# Patient Record
Sex: MALE | Race: WHITE | URBAN - METROPOLITAN AREA
[De-identification: names, ages, dates, MRNs, and addresses within clinical notes are randomized per-mention and may not be internally consistent; named-entity substitution may affect disease eponyms.]

---

## 2017-07-07 ENCOUNTER — INPATIENT (INPATIENT)
Facility: HOSPITAL | Age: 57
LOS: 2 days | Discharge: HOME | End: 2017-07-10
Attending: INTERNAL MEDICINE

## 2017-07-07 DIAGNOSIS — E78.00 PURE HYPERCHOLESTEROLEMIA, UNSPECIFIED: ICD-10-CM

## 2017-07-07 DIAGNOSIS — L03.119 CELLULITIS OF UNSPECIFIED PART OF LIMB: ICD-10-CM

## 2017-07-07 DIAGNOSIS — J18.9 PNEUMONIA, UNSPECIFIED ORGANISM: ICD-10-CM

## 2017-07-07 DIAGNOSIS — R09.02 HYPOXEMIA: ICD-10-CM

## 2017-07-07 DIAGNOSIS — I87.2 VENOUS INSUFFICIENCY (CHRONIC) (PERIPHERAL): ICD-10-CM

## 2017-07-07 DIAGNOSIS — G47.33 OBSTRUCTIVE SLEEP APNEA (ADULT) (PEDIATRIC): ICD-10-CM

## 2017-07-07 DIAGNOSIS — I51.7 CARDIOMEGALY: ICD-10-CM

## 2017-07-07 DIAGNOSIS — I27.2 OTHER SECONDARY PULMONARY HYPERTENSION: ICD-10-CM

## 2017-07-07 DIAGNOSIS — I10 ESSENTIAL (PRIMARY) HYPERTENSION: ICD-10-CM

## 2017-07-12 DIAGNOSIS — J18.9 PNEUMONIA, UNSPECIFIED ORGANISM: ICD-10-CM

## 2017-07-12 DIAGNOSIS — I50.9 HEART FAILURE, UNSPECIFIED: ICD-10-CM

## 2017-07-12 DIAGNOSIS — I11.0 HYPERTENSIVE HEART DISEASE WITH HEART FAILURE: ICD-10-CM

## 2017-07-12 DIAGNOSIS — N40.0 BENIGN PROSTATIC HYPERPLASIA WITHOUT LOWER URINARY TRACT SYMPTOMS: ICD-10-CM

## 2017-07-12 DIAGNOSIS — I27.2 OTHER SECONDARY PULMONARY HYPERTENSION: ICD-10-CM

## 2017-07-12 DIAGNOSIS — G47.33 OBSTRUCTIVE SLEEP APNEA (ADULT) (PEDIATRIC): ICD-10-CM

## 2017-07-12 DIAGNOSIS — I89.0 LYMPHEDEMA, NOT ELSEWHERE CLASSIFIED: ICD-10-CM

## 2017-07-12 DIAGNOSIS — Z86.718 PERSONAL HISTORY OF OTHER VENOUS THROMBOSIS AND EMBOLISM: ICD-10-CM

## 2017-07-12 DIAGNOSIS — J96.20 ACUTE AND CHRONIC RESPIRATORY FAILURE, UNSPECIFIED WHETHER WITH HYPOXIA OR HYPERCAPNIA: ICD-10-CM

## 2017-07-12 DIAGNOSIS — J20.9 ACUTE BRONCHITIS, UNSPECIFIED: ICD-10-CM

## 2017-07-12 DIAGNOSIS — I87.2 VENOUS INSUFFICIENCY (CHRONIC) (PERIPHERAL): ICD-10-CM

## 2017-07-12 DIAGNOSIS — R09.02 HYPOXEMIA: ICD-10-CM

## 2017-07-12 DIAGNOSIS — E66.9 OBESITY, UNSPECIFIED: ICD-10-CM

## 2017-07-12 DIAGNOSIS — E55.9 VITAMIN D DEFICIENCY, UNSPECIFIED: ICD-10-CM

## 2018-02-01 ENCOUNTER — INPATIENT (INPATIENT)
Facility: HOSPITAL | Age: 58
LOS: 1 days | Discharge: HOME | End: 2018-02-03
Attending: INTERNAL MEDICINE

## 2018-02-07 DIAGNOSIS — I11.0 HYPERTENSIVE HEART DISEASE WITH HEART FAILURE: ICD-10-CM

## 2018-02-07 DIAGNOSIS — J44.1 CHRONIC OBSTRUCTIVE PULMONARY DISEASE WITH (ACUTE) EXACERBATION: ICD-10-CM

## 2018-02-07 DIAGNOSIS — I50.9 HEART FAILURE, UNSPECIFIED: ICD-10-CM

## 2018-02-07 DIAGNOSIS — I89.0 LYMPHEDEMA, NOT ELSEWHERE CLASSIFIED: ICD-10-CM

## 2018-02-07 DIAGNOSIS — E66.2 MORBID (SEVERE) OBESITY WITH ALVEOLAR HYPOVENTILATION: ICD-10-CM

## 2018-02-07 DIAGNOSIS — I27.20 PULMONARY HYPERTENSION, UNSPECIFIED: ICD-10-CM

## 2018-02-07 DIAGNOSIS — R09.02 HYPOXEMIA: ICD-10-CM

## 2018-02-07 DIAGNOSIS — Z99.81 DEPENDENCE ON SUPPLEMENTAL OXYGEN: ICD-10-CM

## 2018-02-08 DIAGNOSIS — I50.32 CHRONIC DIASTOLIC (CONGESTIVE) HEART FAILURE: ICD-10-CM

## 2018-02-08 DIAGNOSIS — R06.02 SHORTNESS OF BREATH: ICD-10-CM

## 2018-04-06 PROBLEM — Z00.00 ENCOUNTER FOR PREVENTIVE HEALTH EXAMINATION: Status: ACTIVE | Noted: 2018-04-06

## 2019-08-02 ENCOUNTER — APPOINTMENT (OUTPATIENT)
Dept: VASCULAR SURGERY | Facility: CLINIC | Age: 59
End: 2019-08-02
Payer: MEDICARE

## 2019-08-02 VITALS
DIASTOLIC BLOOD PRESSURE: 100 MMHG | WEIGHT: 315 LBS | HEIGHT: 72 IN | SYSTOLIC BLOOD PRESSURE: 190 MMHG | BODY MASS INDEX: 42.66 KG/M2

## 2019-08-02 PROCEDURE — 29580 STRAPPING UNNA BOOT: CPT | Mod: 50

## 2019-08-09 ENCOUNTER — APPOINTMENT (OUTPATIENT)
Dept: VASCULAR SURGERY | Facility: CLINIC | Age: 59
End: 2019-08-09
Payer: MEDICARE

## 2019-08-09 VITALS — DIASTOLIC BLOOD PRESSURE: 88 MMHG | SYSTOLIC BLOOD PRESSURE: 175 MMHG

## 2019-08-09 PROCEDURE — 29580 STRAPPING UNNA BOOT: CPT | Mod: 50

## 2019-08-16 ENCOUNTER — APPOINTMENT (OUTPATIENT)
Dept: VASCULAR SURGERY | Facility: CLINIC | Age: 59
End: 2019-08-16
Payer: MEDICARE

## 2019-08-16 VITALS — SYSTOLIC BLOOD PRESSURE: 140 MMHG | DIASTOLIC BLOOD PRESSURE: 75 MMHG

## 2019-08-16 PROCEDURE — 29580 STRAPPING UNNA BOOT: CPT | Mod: 50

## 2019-08-16 NOTE — REVIEW OF SYSTEMS
[Chills] : no chills [Fever] : no fever [Chest Pain] : no chest pain [Shortness Of Breath] : no shortness of breath [Abdominal Pain] : no abdominal pain [Constipation] : no constipation [Vomiting] : no vomiting [Diarrhea] : no diarrhea [Dizziness] : no dizziness

## 2019-08-18 ENCOUNTER — INPATIENT (INPATIENT)
Facility: HOSPITAL | Age: 59
LOS: 11 days | Discharge: HOME | End: 2019-08-30
Attending: INTERNAL MEDICINE | Admitting: INTERNAL MEDICINE
Payer: MEDICARE

## 2019-08-18 VITALS
HEART RATE: 85 BPM | RESPIRATION RATE: 24 BRPM | TEMPERATURE: 103 F | HEIGHT: 71 IN | OXYGEN SATURATION: 96 % | SYSTOLIC BLOOD PRESSURE: 233 MMHG | DIASTOLIC BLOOD PRESSURE: 114 MMHG | WEIGHT: 315 LBS

## 2019-08-18 LAB
BASE EXCESS BLDV CALC-SCNC: 7.2 MMOL/L — HIGH (ref -2–2)
CA-I SERPL-SCNC: 1.22 MMOL/L — SIGNIFICANT CHANGE UP (ref 1.12–1.3)
GAS PNL BLDV: 143 MMOL/L — SIGNIFICANT CHANGE UP (ref 136–145)
GAS PNL BLDV: SIGNIFICANT CHANGE UP
HCO3 BLDV-SCNC: 37 MMOL/L — HIGH (ref 22–29)
HCT VFR BLDA CALC: 56 % — HIGH (ref 34–44)
HGB BLD CALC-MCNC: 18 G/DL — SIGNIFICANT CHANGE UP (ref 14–18)
HOROWITZ INDEX BLDV+IHG-RTO: 40 — SIGNIFICANT CHANGE UP
LACTATE BLDV-MCNC: 1.6 MMOL/L — SIGNIFICANT CHANGE UP (ref 0.5–1.6)
PCO2 BLDV: 70 MMHG — HIGH (ref 41–51)
PH BLDV: 7.32 — SIGNIFICANT CHANGE UP (ref 7.26–7.43)
PO2 BLDV: 45 MMHG — HIGH (ref 20–40)
POTASSIUM BLDV-SCNC: 0.2 MMOL/L — LOW (ref 3.3–5.6)
SAO2 % BLDV: 77 % — SIGNIFICANT CHANGE UP

## 2019-08-18 PROCEDURE — 71045 X-RAY EXAM CHEST 1 VIEW: CPT | Mod: 26

## 2019-08-18 PROCEDURE — 99291 CRITICAL CARE FIRST HOUR: CPT

## 2019-08-18 PROCEDURE — 99053 MED SERV 10PM-8AM 24 HR FAC: CPT

## 2019-08-18 RX ORDER — ACETAMINOPHEN 500 MG
650 TABLET ORAL ONCE
Refills: 0 | Status: COMPLETED | OUTPATIENT
Start: 2019-08-18 | End: 2019-08-18

## 2019-08-18 RX ORDER — NITROGLYCERIN 6.5 MG
15 CAPSULE, EXTENDED RELEASE ORAL
Qty: 50 | Refills: 0 | Status: DISCONTINUED | OUTPATIENT
Start: 2019-08-18 | End: 2019-08-19

## 2019-08-18 RX ORDER — ASPIRIN/CALCIUM CARB/MAGNESIUM 324 MG
325 TABLET ORAL ONCE
Refills: 0 | Status: COMPLETED | OUTPATIENT
Start: 2019-08-18 | End: 2019-08-18

## 2019-08-19 LAB
ALBUMIN SERPL ELPH-MCNC: 3.2 G/DL — LOW (ref 3.5–5.2)
ALBUMIN SERPL ELPH-MCNC: 3.8 G/DL — SIGNIFICANT CHANGE UP (ref 3.5–5.2)
ALP SERPL-CCNC: 69 U/L — SIGNIFICANT CHANGE UP (ref 30–115)
ALP SERPL-CCNC: 84 U/L — SIGNIFICANT CHANGE UP (ref 30–115)
ALT FLD-CCNC: 12 U/L — SIGNIFICANT CHANGE UP (ref 0–41)
ALT FLD-CCNC: 14 U/L — SIGNIFICANT CHANGE UP (ref 0–41)
ANION GAP SERPL CALC-SCNC: 11 MMOL/L — SIGNIFICANT CHANGE UP (ref 7–14)
ANION GAP SERPL CALC-SCNC: 11 MMOL/L — SIGNIFICANT CHANGE UP (ref 7–14)
ANISOCYTOSIS BLD QL: SLIGHT — SIGNIFICANT CHANGE UP
APPEARANCE UR: ABNORMAL
AST SERPL-CCNC: 17 U/L — SIGNIFICANT CHANGE UP (ref 0–41)
AST SERPL-CCNC: 17 U/L — SIGNIFICANT CHANGE UP (ref 0–41)
BACTERIA # UR AUTO: ABNORMAL
BASE EXCESS BLDA CALC-SCNC: 4.4 MMOL/L — HIGH (ref -2–2)
BASE EXCESS BLDA CALC-SCNC: 6.4 MMOL/L — HIGH (ref -2–2)
BASOPHILS # BLD AUTO: 0.02 K/UL — SIGNIFICANT CHANGE UP (ref 0–0.2)
BASOPHILS # BLD AUTO: 0.03 K/UL — SIGNIFICANT CHANGE UP (ref 0–0.2)
BASOPHILS NFR BLD AUTO: 0.1 % — SIGNIFICANT CHANGE UP (ref 0–1)
BASOPHILS NFR BLD AUTO: 0.2 % — SIGNIFICANT CHANGE UP (ref 0–1)
BILIRUB SERPL-MCNC: 1 MG/DL — SIGNIFICANT CHANGE UP (ref 0.2–1.2)
BILIRUB SERPL-MCNC: 1.9 MG/DL — HIGH (ref 0.2–1.2)
BILIRUB UR-MCNC: NEGATIVE — SIGNIFICANT CHANGE UP
BUN SERPL-MCNC: 20 MG/DL — SIGNIFICANT CHANGE UP (ref 10–20)
BUN SERPL-MCNC: 23 MG/DL — HIGH (ref 10–20)
CALCIUM SERPL-MCNC: 8.6 MG/DL — SIGNIFICANT CHANGE UP (ref 8.5–10.1)
CALCIUM SERPL-MCNC: 8.9 MG/DL — SIGNIFICANT CHANGE UP (ref 8.5–10.1)
CHLORIDE SERPL-SCNC: 101 MMOL/L — SIGNIFICANT CHANGE UP (ref 98–110)
CHLORIDE SERPL-SCNC: 95 MMOL/L — LOW (ref 98–110)
CO2 SERPL-SCNC: 33 MMOL/L — HIGH (ref 17–32)
CO2 SERPL-SCNC: 33 MMOL/L — HIGH (ref 17–32)
COLOR SPEC: SIGNIFICANT CHANGE UP
CREAT SERPL-MCNC: 1.2 MG/DL — SIGNIFICANT CHANGE UP (ref 0.7–1.5)
CREAT SERPL-MCNC: 1.3 MG/DL — SIGNIFICANT CHANGE UP (ref 0.7–1.5)
DIFF PNL FLD: ABNORMAL
EOSINOPHIL # BLD AUTO: 0.01 K/UL — SIGNIFICANT CHANGE UP (ref 0–0.7)
EOSINOPHIL # BLD AUTO: 0.05 K/UL — SIGNIFICANT CHANGE UP (ref 0–0.7)
EOSINOPHIL NFR BLD AUTO: 0 % — SIGNIFICANT CHANGE UP (ref 0–8)
EOSINOPHIL NFR BLD AUTO: 0.4 % — SIGNIFICANT CHANGE UP (ref 0–8)
EPI CELLS # UR: ABNORMAL /HPF
ESTIMATED AVERAGE GLUCOSE: 131 MG/DL — HIGH (ref 68–114)
GLUCOSE BLDC GLUCOMTR-MCNC: 109 MG/DL — HIGH (ref 70–99)
GLUCOSE BLDC GLUCOMTR-MCNC: 128 MG/DL — HIGH (ref 70–99)
GLUCOSE BLDC GLUCOMTR-MCNC: 99 MG/DL — SIGNIFICANT CHANGE UP (ref 70–99)
GLUCOSE SERPL-MCNC: 126 MG/DL — HIGH (ref 70–99)
GLUCOSE SERPL-MCNC: 94 MG/DL — SIGNIFICANT CHANGE UP (ref 70–99)
GLUCOSE UR QL: NEGATIVE MG/DL — SIGNIFICANT CHANGE UP
HBA1C BLD-MCNC: 6.2 % — HIGH (ref 4–5.6)
HCO3 BLDA-SCNC: 35 MMOL/L — HIGH (ref 23–27)
HCO3 BLDA-SCNC: 36 MMOL/L — HIGH (ref 23–27)
HCT VFR BLD CALC: 54.2 % — HIGH (ref 42–52)
HCT VFR BLD CALC: 57.9 % — HIGH (ref 42–52)
HCV AB S/CO SERPL IA: 0.09 S/CO — SIGNIFICANT CHANGE UP (ref 0–0.99)
HCV AB SERPL-IMP: SIGNIFICANT CHANGE UP
HGB BLD-MCNC: 15.9 G/DL — SIGNIFICANT CHANGE UP (ref 14–18)
HGB BLD-MCNC: 16.7 G/DL — SIGNIFICANT CHANGE UP (ref 14–18)
HOROWITZ INDEX BLDA+IHG-RTO: 21 — SIGNIFICANT CHANGE UP
HOROWITZ INDEX BLDA+IHG-RTO: 35 — SIGNIFICANT CHANGE UP
IMM GRANULOCYTES NFR BLD AUTO: 0.4 % — HIGH (ref 0.1–0.3)
IMM GRANULOCYTES NFR BLD AUTO: 1.3 % — HIGH (ref 0.1–0.3)
KETONES UR-MCNC: NEGATIVE — SIGNIFICANT CHANGE UP
LACTATE SERPL-SCNC: 1.3 MMOL/L — SIGNIFICANT CHANGE UP (ref 0.5–2.2)
LACTATE SERPL-SCNC: 1.7 MMOL/L — SIGNIFICANT CHANGE UP (ref 0.5–2.2)
LEUKOCYTE ESTERASE UR-ACNC: NEGATIVE — SIGNIFICANT CHANGE UP
LYMPHOCYTES # BLD AUTO: 0.3 K/UL — LOW (ref 1.2–3.4)
LYMPHOCYTES # BLD AUTO: 0.37 K/UL — LOW (ref 1.2–3.4)
LYMPHOCYTES # BLD AUTO: 1.4 % — LOW (ref 20.5–51.1)
LYMPHOCYTES # BLD AUTO: 3.2 % — LOW (ref 20.5–51.1)
MAGNESIUM SERPL-MCNC: 1.4 MG/DL — LOW (ref 1.8–2.4)
MANUAL SMEAR VERIFICATION: SIGNIFICANT CHANGE UP
MCHC RBC-ENTMCNC: 25.6 PG — LOW (ref 27–31)
MCHC RBC-ENTMCNC: 25.9 PG — LOW (ref 27–31)
MCHC RBC-ENTMCNC: 28.8 G/DL — LOW (ref 32–37)
MCHC RBC-ENTMCNC: 29.3 G/DL — LOW (ref 32–37)
MCV RBC AUTO: 88.3 FL — SIGNIFICANT CHANGE UP (ref 80–94)
MCV RBC AUTO: 88.7 FL — SIGNIFICANT CHANGE UP (ref 80–94)
MONOCYTES # BLD AUTO: 0.63 K/UL — HIGH (ref 0.1–0.6)
MONOCYTES # BLD AUTO: 1.59 K/UL — HIGH (ref 0.1–0.6)
MONOCYTES NFR BLD AUTO: 5.5 % — SIGNIFICANT CHANGE UP (ref 1.7–9.3)
MONOCYTES NFR BLD AUTO: 7.4 % — SIGNIFICANT CHANGE UP (ref 1.7–9.3)
NEUTROPHILS # BLD AUTO: 10.28 K/UL — HIGH (ref 1.4–6.5)
NEUTROPHILS # BLD AUTO: 19.33 K/UL — HIGH (ref 1.4–6.5)
NEUTROPHILS NFR BLD AUTO: 89.8 % — HIGH (ref 42.2–75.2)
NEUTROPHILS NFR BLD AUTO: 90.3 % — HIGH (ref 42.2–75.2)
NEUTS BAND # BLD: 1 % — SIGNIFICANT CHANGE UP (ref 0–6)
NITRITE UR-MCNC: NEGATIVE — SIGNIFICANT CHANGE UP
NRBC # BLD: 0 /100 WBCS — SIGNIFICANT CHANGE UP (ref 0–0)
NRBC # BLD: 0 /100 WBCS — SIGNIFICANT CHANGE UP (ref 0–0)
NRBC # BLD: 0 /100 — SIGNIFICANT CHANGE UP (ref 0–0)
NT-PROBNP SERPL-SCNC: 3162 PG/ML — HIGH (ref 0–300)
PCO2 BLDA: 64 MMHG — CRITICAL HIGH (ref 38–42)
PCO2 BLDA: 88 MMHG — CRITICAL HIGH (ref 38–42)
PH BLDA: 7.22 — LOW (ref 7.38–7.42)
PH BLDA: 7.34 — LOW (ref 7.38–7.42)
PH UR: 5.5 — SIGNIFICANT CHANGE UP (ref 5–8)
PHOSPHATE SERPL-MCNC: 3.3 MG/DL — SIGNIFICANT CHANGE UP (ref 2.1–4.9)
PLAT MORPH BLD: NORMAL — SIGNIFICANT CHANGE UP
PLATELET # BLD AUTO: 150 K/UL — SIGNIFICANT CHANGE UP (ref 130–400)
PLATELET # BLD AUTO: 166 K/UL — SIGNIFICANT CHANGE UP (ref 130–400)
PLATELET COUNT - ESTIMATE: NORMAL — SIGNIFICANT CHANGE UP
PO2 BLDA: 68 MMHG — LOW (ref 78–95)
PO2 BLDA: 99 MMHG — HIGH (ref 78–95)
POTASSIUM SERPL-MCNC: 3.7 MMOL/L — SIGNIFICANT CHANGE UP (ref 3.5–5)
POTASSIUM SERPL-MCNC: 4.2 MMOL/L — SIGNIFICANT CHANGE UP (ref 3.5–5)
POTASSIUM SERPL-SCNC: 3.7 MMOL/L — SIGNIFICANT CHANGE UP (ref 3.5–5)
POTASSIUM SERPL-SCNC: 4.2 MMOL/L — SIGNIFICANT CHANGE UP (ref 3.5–5)
PROT SERPL-MCNC: 6.7 G/DL — SIGNIFICANT CHANGE UP (ref 6–8)
PROT SERPL-MCNC: 8 G/DL — SIGNIFICANT CHANGE UP (ref 6–8)
PROT UR-MCNC: 100 MG/DL
RBC # BLD: 6.14 M/UL — HIGH (ref 4.7–6.1)
RBC # BLD: 6.53 M/UL — HIGH (ref 4.7–6.1)
RBC # FLD: 18 % — HIGH (ref 11.5–14.5)
RBC # FLD: 18 % — HIGH (ref 11.5–14.5)
RBC BLD AUTO: NORMAL — SIGNIFICANT CHANGE UP
RBC CASTS # UR COMP ASSIST: >50 /HPF
SAO2 % BLDA: 94 % — SIGNIFICANT CHANGE UP (ref 94–98)
SAO2 % BLDA: 96 % — SIGNIFICANT CHANGE UP (ref 94–98)
SODIUM SERPL-SCNC: 139 MMOL/L — SIGNIFICANT CHANGE UP (ref 135–146)
SODIUM SERPL-SCNC: 145 MMOL/L — SIGNIFICANT CHANGE UP (ref 135–146)
SP GR SPEC: 1.02 — SIGNIFICANT CHANGE UP (ref 1.01–1.03)
TROPONIN T SERPL-MCNC: <0.01 NG/ML — SIGNIFICANT CHANGE UP
TROPONIN T SERPL-MCNC: <0.01 NG/ML — SIGNIFICANT CHANGE UP
UROBILINOGEN FLD QL: 1 MG/DL (ref 0.2–0.2)
WBC # BLD: 11.39 K/UL — HIGH (ref 4.8–10.8)
WBC # BLD: 21.53 K/UL — HIGH (ref 4.8–10.8)
WBC # FLD AUTO: 11.39 K/UL — HIGH (ref 4.8–10.8)
WBC # FLD AUTO: 21.53 K/UL — HIGH (ref 4.8–10.8)
WBC UR QL: SIGNIFICANT CHANGE UP /HPF

## 2019-08-19 PROCEDURE — 93970 EXTREMITY STUDY: CPT | Mod: 26

## 2019-08-19 PROCEDURE — 99222 1ST HOSP IP/OBS MODERATE 55: CPT

## 2019-08-19 RX ORDER — HEPARIN SODIUM 5000 [USP'U]/ML
5000 INJECTION INTRAVENOUS; SUBCUTANEOUS EVERY 8 HOURS
Refills: 0 | Status: DISCONTINUED | OUTPATIENT
Start: 2019-08-19 | End: 2019-08-30

## 2019-08-19 RX ORDER — CHLORHEXIDINE GLUCONATE 213 G/1000ML
1 SOLUTION TOPICAL
Refills: 0 | Status: DISCONTINUED | OUTPATIENT
Start: 2019-08-19 | End: 2019-08-30

## 2019-08-19 RX ORDER — VANCOMYCIN HCL 1 G
1000 VIAL (EA) INTRAVENOUS ONCE
Refills: 0 | Status: COMPLETED | OUTPATIENT
Start: 2019-08-19 | End: 2019-08-19

## 2019-08-19 RX ORDER — FUROSEMIDE 40 MG
40 TABLET ORAL DAILY
Refills: 0 | Status: DISCONTINUED | OUTPATIENT
Start: 2019-08-19 | End: 2019-08-19

## 2019-08-19 RX ORDER — FUROSEMIDE 40 MG
40 TABLET ORAL
Refills: 0 | Status: DISCONTINUED | OUTPATIENT
Start: 2019-08-19 | End: 2019-08-20

## 2019-08-19 RX ORDER — CEFEPIME 1 G/1
2000 INJECTION, POWDER, FOR SOLUTION INTRAMUSCULAR; INTRAVENOUS ONCE
Refills: 0 | Status: COMPLETED | OUTPATIENT
Start: 2019-08-19 | End: 2019-08-19

## 2019-08-19 RX ORDER — IPRATROPIUM/ALBUTEROL SULFATE 18-103MCG
3 AEROSOL WITH ADAPTER (GRAM) INHALATION EVERY 6 HOURS
Refills: 0 | Status: DISCONTINUED | OUTPATIENT
Start: 2019-08-19 | End: 2019-08-30

## 2019-08-19 RX ORDER — MAGNESIUM SULFATE 500 MG/ML
2 VIAL (ML) INJECTION ONCE
Refills: 0 | Status: COMPLETED | OUTPATIENT
Start: 2019-08-19 | End: 2019-08-19

## 2019-08-19 RX ORDER — METOPROLOL TARTRATE 50 MG
25 TABLET ORAL
Refills: 0 | Status: DISCONTINUED | OUTPATIENT
Start: 2019-08-19 | End: 2019-08-22

## 2019-08-19 RX ORDER — TAMSULOSIN HYDROCHLORIDE 0.4 MG/1
0.4 CAPSULE ORAL AT BEDTIME
Refills: 0 | Status: DISCONTINUED | OUTPATIENT
Start: 2019-08-19 | End: 2019-08-30

## 2019-08-19 RX ORDER — PANTOPRAZOLE SODIUM 20 MG/1
40 TABLET, DELAYED RELEASE ORAL
Refills: 0 | Status: DISCONTINUED | OUTPATIENT
Start: 2019-08-19 | End: 2019-08-30

## 2019-08-19 RX ORDER — FUROSEMIDE 40 MG
80 TABLET ORAL ONCE
Refills: 0 | Status: COMPLETED | OUTPATIENT
Start: 2019-08-19 | End: 2019-08-19

## 2019-08-19 RX ORDER — VANCOMYCIN HCL 1 G
2000 VIAL (EA) INTRAVENOUS EVERY 12 HOURS
Refills: 0 | Status: DISCONTINUED | OUTPATIENT
Start: 2019-08-19 | End: 2019-08-20

## 2019-08-19 RX ORDER — CEFEPIME 1 G/1
2000 INJECTION, POWDER, FOR SOLUTION INTRAMUSCULAR; INTRAVENOUS EVERY 8 HOURS
Refills: 0 | Status: DISCONTINUED | OUTPATIENT
Start: 2019-08-19 | End: 2019-08-20

## 2019-08-19 RX ORDER — LISINOPRIL 2.5 MG/1
40 TABLET ORAL DAILY
Refills: 0 | Status: DISCONTINUED | OUTPATIENT
Start: 2019-08-19 | End: 2019-08-20

## 2019-08-19 RX ADMIN — CHLORHEXIDINE GLUCONATE 1 APPLICATION(S): 213 SOLUTION TOPICAL at 05:14

## 2019-08-19 RX ADMIN — Medication 250 MILLIGRAM(S): at 01:13

## 2019-08-19 RX ADMIN — CEFEPIME 100 MILLIGRAM(S): 1 INJECTION, POWDER, FOR SOLUTION INTRAMUSCULAR; INTRAVENOUS at 14:02

## 2019-08-19 RX ADMIN — Medication 40 MILLIGRAM(S): at 18:13

## 2019-08-19 RX ADMIN — TAMSULOSIN HYDROCHLORIDE 0.4 MILLIGRAM(S): 0.4 CAPSULE ORAL at 21:08

## 2019-08-19 RX ADMIN — Medication 4.5 MICROGRAM(S)/MIN: at 01:14

## 2019-08-19 RX ADMIN — Medication 1000 MILLIGRAM(S): at 04:39

## 2019-08-19 RX ADMIN — CEFEPIME 2000 MILLIGRAM(S): 1 INJECTION, POWDER, FOR SOLUTION INTRAMUSCULAR; INTRAVENOUS at 02:00

## 2019-08-19 RX ADMIN — HEPARIN SODIUM 5000 UNIT(S): 5000 INJECTION INTRAVENOUS; SUBCUTANEOUS at 14:01

## 2019-08-19 RX ADMIN — CHLORHEXIDINE GLUCONATE 1 APPLICATION(S): 213 SOLUTION TOPICAL at 18:13

## 2019-08-19 RX ADMIN — HEPARIN SODIUM 5000 UNIT(S): 5000 INJECTION INTRAVENOUS; SUBCUTANEOUS at 21:08

## 2019-08-19 RX ADMIN — PANTOPRAZOLE SODIUM 40 MILLIGRAM(S): 20 TABLET, DELAYED RELEASE ORAL at 06:30

## 2019-08-19 RX ADMIN — CEFEPIME 100 MILLIGRAM(S): 1 INJECTION, POWDER, FOR SOLUTION INTRAMUSCULAR; INTRAVENOUS at 21:08

## 2019-08-19 RX ADMIN — Medication 25 MILLIGRAM(S): at 18:17

## 2019-08-19 RX ADMIN — Medication 650 MILLIGRAM(S): at 01:14

## 2019-08-19 RX ADMIN — HEPARIN SODIUM 5000 UNIT(S): 5000 INJECTION INTRAVENOUS; SUBCUTANEOUS at 05:14

## 2019-08-19 RX ADMIN — Medication 50 GRAM(S): at 13:04

## 2019-08-19 RX ADMIN — CEFEPIME 100 MILLIGRAM(S): 1 INJECTION, POWDER, FOR SOLUTION INTRAMUSCULAR; INTRAVENOUS at 06:43

## 2019-08-19 RX ADMIN — Medication 40 MILLIGRAM(S): at 05:01

## 2019-08-19 RX ADMIN — Medication 40 MILLIGRAM(S): at 05:14

## 2019-08-19 RX ADMIN — Medication 1 APPLICATION(S): at 18:17

## 2019-08-19 RX ADMIN — Medication 80 MILLIGRAM(S): at 01:23

## 2019-08-19 RX ADMIN — CEFEPIME 100 MILLIGRAM(S): 1 INJECTION, POWDER, FOR SOLUTION INTRAMUSCULAR; INTRAVENOUS at 01:12

## 2019-08-19 RX ADMIN — Medication 650 MILLIGRAM(S): at 04:39

## 2019-08-19 RX ADMIN — Medication 325 MILLIGRAM(S): at 01:13

## 2019-08-19 RX ADMIN — Medication 250 MILLIGRAM(S): at 14:00

## 2019-08-19 RX ADMIN — LISINOPRIL 40 MILLIGRAM(S): 2.5 TABLET ORAL at 12:59

## 2019-08-19 RX ADMIN — Medication 40 MILLIGRAM(S): at 18:17

## 2019-08-19 NOTE — ED PROVIDER NOTE - CARE PLAN
Principal Discharge DX:	Acute decompensated heart failure  Secondary Diagnosis:	Sepsis  Secondary Diagnosis:	Cellulitis and abscess of lower leg

## 2019-08-19 NOTE — H&P ADULT - ASSESSMENT
58yo Male w/ PMH of HTN, COPD (on Home O2 as needed), CHF BIBA c/o SOB that started today. Associated w/ cough w/ clear sputum. Pt also reports having fever/chills. + severe chronic venous stasis and ulcers b/l. In ED, pt was in acute distress due to dyspnea. BNP 3100s noted. CXR shows vascular congestion b/l. IV lasix 80mg given and BIPAP applied w/ great relief. Pt was very hypertensive w/ SBP > 200, nitro drip started. Pt was febrile w/ Tmax of 103.4.     Discussed w/ Dr. Franco. Admit to high risk tele    CHF exacerbation  - CXR shows b/l vascular congestion. BNP 3100s noted.  - Pt feels much better s/p IV lasix and BIPAP  - IV lasix daily  - c/w BIPAP for now  - NPO for now  - Strict I&Os. Keep I < O.  - EKG, CXR, 2D echo in am  - Cardiology consult     Elevated WBC   - WBC 11k noted. Lactate normal w/ level of 1.7. Sepsis unlikely  - Likely 2/2 severe venous stasis and ulcers  - IV abx  - PAN culture  - ID consult     COPD  - Solumedrol  - Duonebs q6h PRN    HTN  - pt off nitro drip  - c/w home anti-htn meds      DVT prophylaxis: heparin subq  GI prophylaxis: PPI  Diet: NPO 58yo Male w/ PMH of HTN, COPD (on Home O2 as needed), CHF BIBA c/o SOB that started today. Associated w/ cough w/ clear sputum. Pt also reports having fever/chills. + severe chronic venous stasis and ulcers b/l. In ED, pt was in acute distress due to dyspnea. BNP 3100s noted. CXR shows vascular congestion b/l. IV lasix 80mg given and BIPAP applied w/ great relief. Pt was very hypertensive w/ SBP > 200, nitro drip started. Pt was febrile w/ Tmax of 103.4.     Discussed w/ Dr. Franco. Admit to high risk tele    CHF exacerbation  - CXR shows b/l vascular congestion. BNP 3100s noted.  - Pt feels much better s/p IV lasix and BIPAP  - IV lasix daily  - c/w BIPAP for now  - NPO for now  - Strict I&Os. Keep I < O.  - EKG, CXR, 2D echo in am  - Cardiology consult     Sepsis  - WBC 11k noted. Lactate normal w/ level of 1.7.   - Likely 2/2 severe venous stasis and ulcers  - IV abx  - PAN culture  - ID consult     COPD  - Solumedrol  - Duonebs q6h PRN    HTN  - pt off nitro drip  - c/w home anti-htn meds      DVT prophylaxis: heparin subq  GI prophylaxis: PPI  Diet: NPO 60yo Male w/ PMH of HTN, COPD (on Home O2 as needed), CHF BIBA c/o SOB that started today. Associated w/ cough w/ clear sputum. Pt also reports having fever/chills. + severe chronic venous stasis and ulcers b/l. In ED, pt was in acute distress due to dyspnea. BNP 3100s noted. CXR shows vascular congestion b/l. IV lasix 80mg given and BIPAP applied w/ great relief. Pt was very hypertensive w/ SBP > 200, nitro drip started. Pt was febrile w/ Tmax of 103.4.     Discussed w/ Dr. Franco. Admit to high risk tele    CHF exacerbation  - CXR shows b/l vascular congestion. BNP 3100s noted.  - Pt feels much better s/p IV lasix and BIPAP  - IV lasix daily  - c/w BIPAP for now  - NPO for now  - Strict I&Os. Keep I < O.  - EKG, CXR, 2D echo in am  - Cardiology consult     Sepsis  - WBC 11k noted. Lactate normal w/ level of 1.7.   - Likely 2/2 severe venous stasis and ulcers  - IV abx  - PAN culture  - ID consult     COPD  - Solumedrol  - Duonebs q6h PRN  - Fingerstick q6h    HTN  - pt off nitro drip  - c/w home anti-htn meds      DVT prophylaxis: heparin subq  GI prophylaxis: PPI  Diet: NPO

## 2019-08-19 NOTE — H&P ADULT - NSHPLABSRESULTS_GEN_ALL_CORE
I&O's Detail        LABS:                        16.7   11.39 )-----------( 166      ( 18 Aug 2019 23:30 )             57.9     18 Aug 2019 23:30    139    |  95     |  20     ----------------------------<  126    4.2     |  33     |  1.2      Ca    8.9        18 Aug 2019 23:30    TPro  8.0    /  Alb  3.8    /  TBili  1.0    /  DBili  x      /  AST  17     /  ALT  14     /  AlkPhos  84     18 Aug 2019 23:30  Amylase x     lipase x          CARDIAC MARKERS ( 18 Aug 2019 23:30 )  x     / <0.01 ng/mL / x     / x     / x          CAPILLARY BLOOD GLUCOSE          Urinalysis Basic - ( 19 Aug 2019 00:30 )    Color: See Note / Appearance: Cloudy / S.020 / pH: x  Gluc: x / Ketone: Negative  / Bili: Negative / Urobili: 1.0 mg/dL   Blood: x / Protein: 100 mg/dL / Nitrite: Negative   Leuk Esterase: Negative / RBC: >50 /HPF / WBC 1-2 /HPF   Sq Epi: x / Non Sq Epi: Occasional /HPF / Bacteria: Few      Culture    Lactate, Blood: 1.7 mmol/L (19 @ 23:30)      MEDICATIONS  (STANDING):  nitroglycerin  Infusion 15 MICROgram(s)/Min (4.5 mL/Hr) IV Continuous <Continuous>    MEDICATIONS  (PRN):        RADIOLOGY:

## 2019-08-19 NOTE — ED ADULT NURSE NOTE - NSIMPLEMENTINTERV_GEN_ALL_ED
Implemented All Universal Safety Interventions:  [place] to call system. Call bell, personal items and telephone within reach. Instruct patient to call for assistance. Room bathroom lighting operational. Non-slip footwear when patient is off stretcher. Physically safe environment: no spills, clutter or unnecessary equipment. Stretcher in lowest position, wheels locked, appropriate side rails in place.

## 2019-08-19 NOTE — ED PROVIDER NOTE - OBJECTIVE STATEMENT
59 male here for shortness of breath, acute on chronic, from home, brought to ED by EMS. Pt is a limited historian due to dyspnea on arrival, sons present to assist HPI. State he is a poorly compliant patient with heart failure.

## 2019-08-19 NOTE — H&P ADULT - NSHPPHYSICALEXAM_GEN_ALL_CORE
ICU Vital Signs Last 24 Hrs  T(C): 39.7 (18 Aug 2019 23:25), Max: 39.7 (18 Aug 2019 23:25)  T(F): 103.4 (18 Aug 2019 23:25), Max: 103.4 (18 Aug 2019 23:25)  HR: 90 (18 Aug 2019 23:25) (85 - 90)  BP: 233/114 (18 Aug 2019 22:51) (233/114 - 233/114)  BP(mean): --  ABP: --  ABP(mean): --  RR: 24 (18 Aug 2019 22:51) (24 - 24)  SpO2: 98% (18 Aug 2019 23:25) (96% - 98%)        Physical Examination:    General: On BIPAP. Pt w/o acute distress    HEENT: Pupils equal, reactive to light.  Symmetric.    PULM: + rhonchi b/l    CVS: Regular rate and rhythm, no murmurs, rubs, or gallops    ABD: Soft, nondistended, nontender, normoactive bowel sounds, no masses    EXT: + 3 pitting edema b/l    SKIN: + b/l severe veneous stasis and ulcers

## 2019-08-19 NOTE — ED PROVIDER NOTE - CLINICAL SUMMARY MEDICAL DECISION MAKING FREE TEXT BOX
59 male here with CHF / sepsis, poorly compliant morbidly obese male with significant disease burden. Had resuscitation for acute respiratory distress in ED with NIPPV and parenteral nitroglycerin, with improvement, also broad spectrum ABX for presumed cellulitis as source although CXR is non diagnostic due to habitus and he has dyspnea but no cough. Seen by ICU team and plan is CCU-telemetry admission. Plan d/w patient and family in detail throughout his stay.

## 2019-08-19 NOTE — H&P ADULT - NSHPSOCIALHISTORY_GEN_ALL_CORE
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

## 2019-08-19 NOTE — H&P ADULT - HISTORY OF PRESENT ILLNESS
58yo Male w/ PMH of HTN, COPD (on Home O2 as needed), CHF BIBA c/o SOB that started today. Associated w/ cough w/ clear sputum. Pt reports chronically having SOB and cough due to COPD, but reports new onset of more severe SOB today. Pt also reports having fever/chills. Pt thinks he has an infection from his legs. + severe chronic venous stasis and ulcers b/l. Pt sees vascular surgeon weekly for his condition. In ED, pt was in acute distress due to dyspnea. BNP 3100s noted. CXR shows vascular congestion b/l. IV lasix 80mg given and BIPAP applied w/ great relief. Pt was very hypertensive w/ SBP > 200, nitro drip started. Pt was febrile w/ Tmax of 103.4. Pt denies any CP, palpitation, abd pain, N/V/D, dysuria, calf pain, or dizziness/lightheadedness.

## 2019-08-20 DIAGNOSIS — A41.9 SEPSIS, UNSPECIFIED ORGANISM: ICD-10-CM

## 2019-08-20 DIAGNOSIS — I83.009 VARICOSE VEINS OF UNSPECIFIED LOWER EXTREMITY WITH ULCER OF UNSPECIFIED SITE: ICD-10-CM

## 2019-08-20 LAB
-  CANDIDA ALBICANS: SIGNIFICANT CHANGE UP
-  CANDIDA GLABRATA: SIGNIFICANT CHANGE UP
-  CANDIDA KRUSEI: SIGNIFICANT CHANGE UP
-  CANDIDA PARAPSILOSIS: SIGNIFICANT CHANGE UP
-  CANDIDA TROPICALIS: SIGNIFICANT CHANGE UP
-  COAGULASE NEGATIVE STAPHYLOCOCCUS: SIGNIFICANT CHANGE UP
-  K. PNEUMONIAE GROUP: SIGNIFICANT CHANGE UP
-  KPC RESISTANCE GENE: SIGNIFICANT CHANGE UP
-  STREPTOCOCCUS SP. (NOT GRP A, B OR S PNEUMONIAE): SIGNIFICANT CHANGE UP
A BAUMANNII DNA SPEC QL NAA+PROBE: SIGNIFICANT CHANGE UP
ANION GAP SERPL CALC-SCNC: 10 MMOL/L — SIGNIFICANT CHANGE UP (ref 7–14)
ANION GAP SERPL CALC-SCNC: 11 MMOL/L — SIGNIFICANT CHANGE UP (ref 7–14)
BUN SERPL-MCNC: 36 MG/DL — HIGH (ref 10–20)
BUN SERPL-MCNC: 40 MG/DL — HIGH (ref 10–20)
CALCIUM SERPL-MCNC: 8.5 MG/DL — SIGNIFICANT CHANGE UP (ref 8.5–10.1)
CALCIUM SERPL-MCNC: 8.8 MG/DL — SIGNIFICANT CHANGE UP (ref 8.5–10.1)
CHLORIDE SERPL-SCNC: 97 MMOL/L — LOW (ref 98–110)
CHLORIDE SERPL-SCNC: 98 MMOL/L — SIGNIFICANT CHANGE UP (ref 98–110)
CHLORIDE UR-SCNC: 52 — SIGNIFICANT CHANGE UP
CO2 SERPL-SCNC: 32 MMOL/L — SIGNIFICANT CHANGE UP (ref 17–32)
CO2 SERPL-SCNC: 33 MMOL/L — HIGH (ref 17–32)
CREAT ?TM UR-MCNC: 112 MG/DL — SIGNIFICANT CHANGE UP
CREAT SERPL-MCNC: 1.7 MG/DL — HIGH (ref 0.7–1.5)
CREAT SERPL-MCNC: 1.8 MG/DL — HIGH (ref 0.7–1.5)
E CLOAC COMP DNA BLD POS QL NAA+PROBE: SIGNIFICANT CHANGE UP
E COLI DNA BLD POS QL NAA+NON-PROBE: SIGNIFICANT CHANGE UP
ENTEROCOC DNA BLD POS QL NAA+NON-PROBE: SIGNIFICANT CHANGE UP
ENTEROCOC DNA BLD POS QL NAA+NON-PROBE: SIGNIFICANT CHANGE UP
GLUCOSE BLDC GLUCOMTR-MCNC: 134 MG/DL — HIGH (ref 70–99)
GLUCOSE BLDC GLUCOMTR-MCNC: 150 MG/DL — HIGH (ref 70–99)
GLUCOSE BLDC GLUCOMTR-MCNC: 168 MG/DL — HIGH (ref 70–99)
GLUCOSE SERPL-MCNC: 155 MG/DL — HIGH (ref 70–99)
GLUCOSE SERPL-MCNC: 163 MG/DL — HIGH (ref 70–99)
GP B STREP DNA BLD POS QL NAA+NON-PROBE: SIGNIFICANT CHANGE UP
GRAM STN FLD: SIGNIFICANT CHANGE UP
GRAM STN FLD: SIGNIFICANT CHANGE UP
HAEM INFLU DNA BLD POS QL NAA+NON-PROBE: SIGNIFICANT CHANGE UP
HCT VFR BLD CALC: 58.9 % — HIGH (ref 42–52)
HGB BLD-MCNC: 16.4 G/DL — SIGNIFICANT CHANGE UP (ref 14–18)
K OXYTOCA DNA BLD POS QL NAA+NON-PROBE: SIGNIFICANT CHANGE UP
L MONOCYTOG DNA BLD POS QL NAA+NON-PROBE: SIGNIFICANT CHANGE UP
MAGNESIUM SERPL-MCNC: 2 MG/DL — SIGNIFICANT CHANGE UP (ref 1.8–2.4)
MAGNESIUM SERPL-MCNC: 2.1 MG/DL — SIGNIFICANT CHANGE UP (ref 1.8–2.4)
MCHC RBC-ENTMCNC: 25.8 PG — LOW (ref 27–31)
MCHC RBC-ENTMCNC: 27.8 G/DL — LOW (ref 32–37)
MCV RBC AUTO: 92.6 FL — SIGNIFICANT CHANGE UP (ref 80–94)
METHOD TYPE: SIGNIFICANT CHANGE UP
MRSA PCR RESULT.: NEGATIVE — SIGNIFICANT CHANGE UP
MRSA SPEC QL CULT: SIGNIFICANT CHANGE UP
MSSA DNA SPEC QL NAA+PROBE: SIGNIFICANT CHANGE UP
N MEN ISLT CULT: SIGNIFICANT CHANGE UP
NRBC # BLD: 0 /100 WBCS — SIGNIFICANT CHANGE UP (ref 0–0)
OSMOLALITY UR: 336 MOS/KG — SIGNIFICANT CHANGE UP (ref 50–1400)
P AERUGINOSA DNA BLD POS NAA+NON-PROBE: SIGNIFICANT CHANGE UP
PLATELET # BLD AUTO: 126 K/UL — LOW (ref 130–400)
POTASSIUM SERPL-MCNC: 4.9 MMOL/L — SIGNIFICANT CHANGE UP (ref 3.5–5)
POTASSIUM SERPL-MCNC: 5.6 MMOL/L — HIGH (ref 3.5–5)
POTASSIUM SERPL-SCNC: 4.9 MMOL/L — SIGNIFICANT CHANGE UP (ref 3.5–5)
POTASSIUM SERPL-SCNC: 5.6 MMOL/L — HIGH (ref 3.5–5)
POTASSIUM UR-SCNC: 36 MMOL/L — SIGNIFICANT CHANGE UP
RBC # BLD: 6.36 M/UL — HIGH (ref 4.7–6.1)
RBC # FLD: 18.1 % — HIGH (ref 11.5–14.5)
S MARCESCENS DNA BLD POS NAA+NON-PROBE: SIGNIFICANT CHANGE UP
S PNEUM DNA BLD POS QL NAA+NON-PROBE: SIGNIFICANT CHANGE UP
S PYO DNA BLD POS QL NAA+NON-PROBE: SIGNIFICANT CHANGE UP
SODIUM SERPL-SCNC: 140 MMOL/L — SIGNIFICANT CHANGE UP (ref 135–146)
SODIUM SERPL-SCNC: 141 MMOL/L — SIGNIFICANT CHANGE UP (ref 135–146)
SODIUM UR-SCNC: 46 MMOL/L — SIGNIFICANT CHANGE UP
SPECIMEN SOURCE: SIGNIFICANT CHANGE UP
SPECIMEN SOURCE: SIGNIFICANT CHANGE UP
UUN UR-MCNC: 307 MG/DL — SIGNIFICANT CHANGE UP
WBC # BLD: 21.34 K/UL — HIGH (ref 4.8–10.8)
WBC # FLD AUTO: 21.34 K/UL — HIGH (ref 4.8–10.8)

## 2019-08-20 PROCEDURE — 71045 X-RAY EXAM CHEST 1 VIEW: CPT | Mod: 26

## 2019-08-20 RX ORDER — INSULIN HUMAN 100 [IU]/ML
10 INJECTION, SOLUTION SUBCUTANEOUS ONCE
Refills: 0 | Status: COMPLETED | OUTPATIENT
Start: 2019-08-20 | End: 2019-08-20

## 2019-08-20 RX ORDER — DEXTROSE 50 % IN WATER 50 %
50 SYRINGE (ML) INTRAVENOUS ONCE
Refills: 0 | Status: COMPLETED | OUTPATIENT
Start: 2019-08-20 | End: 2019-08-20

## 2019-08-20 RX ORDER — FUROSEMIDE 40 MG
40 TABLET ORAL
Refills: 0 | Status: DISCONTINUED | OUTPATIENT
Start: 2019-08-20 | End: 2019-08-21

## 2019-08-20 RX ORDER — CEFEPIME 1 G/1
2000 INJECTION, POWDER, FOR SOLUTION INTRAMUSCULAR; INTRAVENOUS EVERY 12 HOURS
Refills: 0 | Status: DISCONTINUED | OUTPATIENT
Start: 2019-08-20 | End: 2019-08-23

## 2019-08-20 RX ADMIN — INSULIN HUMAN 10 UNIT(S): 100 INJECTION, SOLUTION SUBCUTANEOUS at 08:52

## 2019-08-20 RX ADMIN — Medication 1 APPLICATION(S): at 18:10

## 2019-08-20 RX ADMIN — CHLORHEXIDINE GLUCONATE 1 APPLICATION(S): 213 SOLUTION TOPICAL at 05:26

## 2019-08-20 RX ADMIN — TAMSULOSIN HYDROCHLORIDE 0.4 MILLIGRAM(S): 0.4 CAPSULE ORAL at 21:43

## 2019-08-20 RX ADMIN — CEFEPIME 100 MILLIGRAM(S): 1 INJECTION, POWDER, FOR SOLUTION INTRAMUSCULAR; INTRAVENOUS at 18:09

## 2019-08-20 RX ADMIN — Medication 50 MILLILITER(S): at 08:49

## 2019-08-20 RX ADMIN — Medication 40 MILLIGRAM(S): at 05:27

## 2019-08-20 RX ADMIN — Medication 40 MILLIGRAM(S): at 18:09

## 2019-08-20 RX ADMIN — Medication 40 MILLIGRAM(S): at 05:28

## 2019-08-20 RX ADMIN — PANTOPRAZOLE SODIUM 40 MILLIGRAM(S): 20 TABLET, DELAYED RELEASE ORAL at 06:15

## 2019-08-20 RX ADMIN — CHLORHEXIDINE GLUCONATE 1 APPLICATION(S): 213 SOLUTION TOPICAL at 18:09

## 2019-08-20 RX ADMIN — Medication 250 MILLIGRAM(S): at 01:28

## 2019-08-20 RX ADMIN — HEPARIN SODIUM 5000 UNIT(S): 5000 INJECTION INTRAVENOUS; SUBCUTANEOUS at 21:43

## 2019-08-20 RX ADMIN — CEFEPIME 100 MILLIGRAM(S): 1 INJECTION, POWDER, FOR SOLUTION INTRAMUSCULAR; INTRAVENOUS at 05:26

## 2019-08-20 RX ADMIN — Medication 25 MILLIGRAM(S): at 18:09

## 2019-08-20 RX ADMIN — Medication 1 APPLICATION(S): at 05:28

## 2019-08-20 RX ADMIN — HEPARIN SODIUM 5000 UNIT(S): 5000 INJECTION INTRAVENOUS; SUBCUTANEOUS at 05:27

## 2019-08-20 RX ADMIN — HEPARIN SODIUM 5000 UNIT(S): 5000 INJECTION INTRAVENOUS; SUBCUTANEOUS at 13:57

## 2019-08-20 NOTE — PROGRESS NOTE ADULT - ASSESSMENT
60 y/o male hypoxia sepsis / hypoventilation olive     - reviewed chart and spoke with house staff    #) Acute Hypoxic resp failure 2/2 Acute decompensated HF  - Admit to Telemetry  - CXR shows congestive HF signs, BNP 3162  - Echo 08/2019 was suboptimal study shows Normal EF with LV Moderate concentric hypertrophy  - Duplex b/l LE neg  - Lasix IV 40mg BID  - Monitor I & O, Cr, and daily weight  - Keep Avelar in place  - CXR in am    #) Acute hypercapnic respiratory failure secondary to COPD exacerbation with chronic CO2 retention 2/2 OHS and COPD  - Respiratory acidosis on presentation  - Improved on AVAPS, cont 4 on 4 off and QHS  - ABG in am  - Keep O2 88-92%  - Prednisone 40mg daily  - Neb q4hr and PRN    #) Sepsis secondary to GNR bacteremia and LE cellulitis  - T max 102 with leukocytosis and normal lactate  - UA neg  - Blood culture shows GNR. Pending Final culture  - Cont Cefepime 2000mg Q12 and d/c Vancomycin  - F/U repeat Blood culture  - F/U ID recommendations    #) OLIVE on CKD  - Cr today 1.8 (baseline 1.3)  - Possible Cardiorenal vs Diuretic induced vs Hypotension induced vs Vancomycin induced  - Hold lisinopril  - F/U Urine lytes  - Nephrology consulted    #) Hyperkalemia  - Likely Secondary ro OLIVE  - Hold Lisinopril  - Insulin and Dextrose  - F/U repeat BMP    DVT ppx Heparin Sq  DASH diet with Fluid restriction

## 2019-08-20 NOTE — PROGRESS NOTE ADULT - ASSESSMENT
58yo Male w/ PMH of HTN, COPD (on Home O2 as needed), CHF BIBA c/o SOB     #) Acute Hypoxic resp failure 2/2 Acute decompensated HF  - Admit to Telemetry  - CXR shows congestive HF signs, BNP 3162  - Echo 08/2019 was suboptimal study shows Normal EF with LV Moderate concentric hypertrophy  - Duplex b/l LE neg  - Lasix IV 40mg BID  - Monitor I & O, Cr, and daily weight  - Keep Avelar in place  - CXR in am    #) Acute hypercapnic respiratory failure secondary to COPD exacerbation with chronic CO2 retention 2/2 OHS and COPD  - Respiratory acidosis on presentation  - Improved on AVAPS, cont 4 on 4 off and QHS  - ABG in am  - Keep O2 88-92%  - Prednisone 40mg daily  - Neb q4hr and PRN    #) Sepsis secondary to GNR bacteremia and LE cellulitis  - T max 102 with leukocytosis and normal lactate  - UA neg  - Blood culture shows GNR. Pending Final culture  - Cont Cefepime 2000mg Q12 and d/c Vancomycin  - F/U repeat Blood culture  - F/U ID recommendations    #) OLIVE on CKD  - Cr today 1.8 (baseline 1.3)  - Possible Cardiorenal vs Diuretic induced vs Hypotension induced vs Vancomycin induced  - Hold lisinopril  - F/U Urine lytes  - Nephrology consulted    #) Hyperkalemia  - Likely Secondary ro OLIVE  - Hold Lisinopril  - Insulin and Dextrose  - F/U repeat BMP    DVT ppx Heparin Sq  DASH diet with Fluid restriction

## 2019-08-20 NOTE — PROGRESS NOTE ADULT - SUBJECTIVE AND OBJECTIVE BOX
SUBJECTIVE:    Patient is a 59y old Male who presents with a chief complaint of CHF exacerbation (20 Aug 2019 09:12)    Currently admitted to medicine with the primary diagnosis of Acute decompensated heart failure     Today is hospital day 1d. This morning he is resting comfortably in bed and reports no new issues or overnight events. Pt using AVAP overnight    PAST MEDICAL & SURGICAL HISTORY  Chronic CHF  COPD, severe  Hypertension    SOCIAL HISTORY:  Negative for smoking/alcohol/drug use.     Home Medications:  Home Medications:  furosemide 40 mg oral tablet: 1 tab(s) orally once a day (19 Aug 2019 02:14)  lisinopril 40 mg oral tablet: 1 tab(s) orally once a day (19 Aug 2019 02:14)  metoprolol tartrate 25 mg oral tablet: 1 tab(s) orally 2 times a day (19 Aug 2019 02:14)  tamsulosin 0.4 mg oral capsule: 1 cap(s) orally once a day (19 Aug 2019 02:14)      ALLERGIES:  Allergy Status Unknown    MEDICATIONS:  STANDING MEDICATIONS  cefepime   IVPB 2000 milliGRAM(s) IV Intermittent every 12 hours  chlorhexidine 4% Liquid 1 Application(s) Topical two times a day  furosemide   Injectable 40 milliGRAM(s) IV Push two times a day  heparin  Injectable 5000 Unit(s) SubCutaneous every 8 hours  metoprolol tartrate 25 milliGRAM(s) Oral two times a day  pantoprazole    Tablet 40 milliGRAM(s) Oral before breakfast  silver sulfADIAZINE 1% Cream 1 Application(s) Topical two times a day  tamsulosin 0.4 milliGRAM(s) Oral at bedtime    PRN MEDICATIONS  ALBUTerol/ipratropium for Nebulization 3 milliLiter(s) Nebulizer every 6 hours PRN    VITALS:   Vital Signs Last 24 Hrs  T(C): 35.9 (20 Aug 2019 07:05), Max: 36.5 (19 Aug 2019 23:11)  T(F): 96.6 (20 Aug 2019 07:05), Max: 97.7 (19 Aug 2019 23:11)  HR: 51 (20 Aug 2019 07:13) (49 - 68)  BP: 96/57 (20 Aug 2019 07:13) (85/52 - 117/53)  BP(mean): 72 (20 Aug 2019 07:13) (63 - 77)  RR: 32 (20 Aug 2019 07:13) (18 - 32)  SpO2: 92% (20 Aug 2019 07:13) (90% - 96%)  CAPILLARY BLOOD GLUCOSE      POCT Blood Glucose.: 134 mg/dL (20 Aug 2019 00:27)  POCT Blood Glucose.: 128 mg/dL (19 Aug 2019 18:49)  POCT Blood Glucose.: 109 mg/dL (19 Aug 2019 12:04)      LABS:                        16.4   21.34 )-----------( 126      ( 20 Aug 2019 06:22 )             58.9     08-20    140  |  97<L>  |  36<H>  ----------------------------<  155<H>  5.6<H>   |  33<H>  |  1.8<H>    Ca    8.8      20 Aug 2019 06:22  Phos  3.3       Mg     2.1         TPro  6.7  /  Alb  3.2<L>  /  TBili  1.9<H>  /  DBili  x   /  AST  17  /  ALT  12  /  AlkPhos  69  19      Urinalysis Basic - ( 19 Aug 2019 00:30 )    Color: See Note / Appearance: Cloudy / S.020 / pH: x  Gluc: x / Ketone: Negative  / Bili: Negative / Urobili: 1.0 mg/dL   Blood: x / Protein: 100 mg/dL / Nitrite: Negative   Leuk Esterase: Negative / RBC: >50 /HPF / WBC 1-2 /HPF   Sq Epi: x / Non Sq Epi: Occasional /HPF / Bacteria: Few      ABG - ( 20 Aug 2019 06:18 )  pH, Arterial: 7.29  pH, Blood: x     /  pCO2: 71    /  pO2: 59    / HCO3: 34    / Base Excess: 4.2   /  SaO2: 89                Troponin T, Serum: <0.01 ng/mL (19 @ 11:45)      Culture - Blood (collected 18 Aug 2019 23:30)  Source: .Blood Blood  Gram Stain (20 Aug 2019 02:55):    Growth in aerobic bottle: Gram Negative Rods  Preliminary Report (20 Aug 2019 02:55):    Growth in aerobic bottle: Gram Negative Rods    Culture - Blood (collected 18 Aug 2019 23:30)  Source: .Blood Blood  Gram Stain (20 Aug 2019 02:26):    Growth in aerobic bottle: Gram Negative Rods  Preliminary Report (20 Aug 2019 02:26):    Growth in aerobic bottle: Gram Negative Rods    "Due to technical problems, Proteus sp. will Not be reported as part of    the BCID panel until further notice"    ***Blood Panel PCR results on this specimen are available    approximately 3 hours after the Gram stain result.***    Gram stain, PCR, and/or culture results may not always    correspond due to difference in methodologies.    ************************************************************    This PCR assay was performed using [employer].    The following targets are tested for: Enterococcus,    vancomycin resistant enterococci, Listeria monocytogenes,    coagulase negative staphylococci, S. aureus,    methicillin resistant S. aureus, Streptococcus agalactiae    (Group B), S. pneumoniae, S. pyogenes (Group A),    Acinetobacter baumannii, Enterobacter cloacae, E. coli,    Klebsiella oxytoca, K. pneumoniae, Proteus sp.,    Serratia marcescens, Haemophilus influenzae,    Neisseria meningitidis, Pseudomonas aeruginosa, Candida    albicans, C. glabrata, C krusei, C parapsilosis,    C. tropicalis and the KPC resistance gene.  Organism: Blood Culture PCR (20 Aug 2019 04:32)  Organism: Blood Culture PCR (20 Aug 2019 04:32)      CARDIAC MARKERS ( 19 Aug 2019 11:45 )  x     / <0.01 ng/mL / x     / x     / x      CARDIAC MARKERS ( 18 Aug 2019 23:30 )  x     / <0.01 ng/mL / x     / x     / x          RADIOLOGY:  < from: Xray Chest 1 View- PORTABLE-Routine (19 @ 05:55) >  Findings of congestive heart failure.    < end of copied text >    PHYSICAL EXAM:  GEN: No acute distress on 2L NC  LUNGS: Rales appreciated b/l  HEART: Distant heart sounds, RRR no murmur gallop or Rub  ABD: Soft, non-tender, non-distended. Bowel sounds present  EXT: B/L venous statis ulcers with clear fluid oozing  NEURO: AAOX3

## 2019-08-20 NOTE — PROGRESS NOTE ADULT - SUBJECTIVE AND OBJECTIVE BOX
CATE GALVAN  59y, Male  Allergy: Allergy Status Unknown      CHIEF COMPLAINT: CHF exacerbation (19 Aug 2019 09:00)      INTERVAL EVENTS/HPI  - No acute events overnight  - T(F): , Max: 98.8 (19 @ 07:10)  - Denies any worsening symptoms  - Tolerating medication    ROS  10 system review- occass cough     SOCIAL HISTORY    Substance Use (X  ) never used  (  ) IVDU (  ) Other:  Tobacco Usage:  (  X ) never smoked   (   ) former smoker   (   ) current smoker   Alcohol Usage: (   ) social  (   ) daily use ( X  ) denies  Sexual History: not relevant      FH noncontributory     VITALS:  T(F): 97.7, Max: 98.8 (19 @ 07:10)  HR: 55  BP: 85/52  RR: 32Vital Signs Last 24 Hrs  T(C): 36.5 (19 Aug 2019 23:11), Max: 37.1 (19 Aug 2019 07:10)  T(F): 97.7 (19 Aug 2019 23:11), Max: 98.8 (19 Aug 2019 07:10)  HR: 55 (20 Aug 2019 00:03) (54 - 77)  BP: 85/52 (19 Aug 2019 23:11) (85/52 - 117/53)  BP(mean): 63 (19 Aug 2019 23:11) (63 - 77)  RR: 32 (19 Aug 2019 23:11) (18 - 32)  SpO2: 91% (20 Aug 2019 00:03) (90% - 94%)    PHYSICAL EXAM:  Gen: NAD, resting in bed  HEENT: Normocephalic, atraumatic  Neck: supple, no lymphadenopathy  CV: s1 s2 +  Lungs: decreased BS   Abdomen: Soft, BS present. broussard +   Ext: Warm, well perfused. LE with venous stasis ulcers - no sig warmth. min erythema   Neuro: non focal, awake  Skin: no rash      TESTS & MEASUREMENTS:                        15.9   21.53 )-----------( 150      ( 19 Aug 2019 05:30 )             54.2         145  |  101  |  23<H>  ----------------------------<  94  3.7   |  33<H>  |  1.3    Ca    8.6      19 Aug 2019 05:30  Phos  3.3       Mg     1.4         TPro  6.7  /  Alb  3.2<L>  /  TBili  1.9<H>  /  DBili  x   /  AST  17  /  ALT  12  /  AlkPhos  69        LIVER FUNCTIONS - ( 19 Aug 2019 05:30 )  Alb: 3.2 g/dL / Pro: 6.7 g/dL / ALK PHOS: 69 U/L / ALT: 12 U/L / AST: 17 U/L / GGT: x           Urinalysis Basic - ( 19 Aug 2019 00:30 )    Color: See Note / Appearance: Cloudy / S.020 / pH: x  Gluc: x / Ketone: Negative  / Bili: Negative / Urobili: 1.0 mg/dL   Blood: x / Protein: 100 mg/dL / Nitrite: Negative   Leuk Esterase: Negative / RBC: >50 /HPF / WBC 1-2 /HPF   Sq Epi: x / Non Sq Epi: Occasional /HPF / Bacteria: Few        Culture - Blood (collected 19 @ 23:30)  Source: .Blood Blood  Gram Stain (19 @ 02:55):    Growth in aerobic bottle: Gram Negative Rods  Preliminary Report (19 @ 02:55):    Growth in aerobic bottle: Gram Negative Rods    Culture - Blood (collected 19 @ 23:30)  Source: .Blood Blood  Gram Stain (19 @ 02:26):    Growth in aerobic bottle: Gram Negative Rods  Preliminary Report (19 @ 02:26):    Growth in aerobic bottle: Gram Negative Rods    "Due to technical problems, Proteus sp. will Not be reported as part of    the BCID panel until further notice"    ***Blood Panel PCR results on this specimen are available    approximately 3 hours after the Gram stain result.***    Gram stain, PCR, and/or culture results may not always    correspond due to difference in methodologies.    ************************************************************    This PCR assay was performed using [employer].    The following targets are tested for: Enterococcus,    vancomycin resistant enterococci, Listeria monocytogenes,    coagulase negative staphylococci, S. aureus,    methicillin resistant S. aureus, Streptococcus agalactiae    (Group B), S. pneumoniae, S. pyogenes (Group A),    Acinetobacter baumannii, Enterobacter cloacae, E. coli,    Klebsiella oxytoca, K. pneumoniae, Proteus sp.,    Serratia marcescens, Haemophilus influenzae,    Neisseria meningitidis, Pseudomonas aeruginosa, Candida    albicans, C. glabrata, C krusei, C parapsilosis,    C. tropicalis and the KPC resistance gene.  Organism: Blood Culture PCR (19 @ 04:32)  Organism: Blood Culture PCR (19 @ 04:32)      -  Acinetobacter baumanii: Nondet      -  Candida albicans: Nondet      -  Candida glabrata: Nondet      -  Candida krusei: Nondet      -  Candida parapsilosis: Nondet      -  Candida tropicalis: Nondet      -  Coagulase negative Staphylococcus: Nondet      -  Enterobacter cloacae complex: Nondet      -  Enterococcus species: Nondet      -  Escherichia coli: Nondet      -  Haemophilus influenzae: Nondet      -  Klebsiella oxytoca: Nondet      -  Klebsiella pneumoniae: Nondet      -  Listeria monocytogenes: Nondet      -  Methicillin resistant Staphylococcus aureus (MRSA): Nondet      -  Multidrug (KPC pos) resistant organism: Nondet      -  Neisseria meningitidis: Nondet      -  Pseudomonas aeruginosa: Nondet      -  Serratia marcescens: Nondet      -  Staphylococcus aureus: Nondet      -  Streptococcus agalactiae (Group B): Nondet      -  Streptococcus pneumoniae: Nondet      -  Streptococcus pyogenes (Group A): Nondet      -  Streptococcus sp. (Not Grp A, B or S pneumoniae): Nondet      -  Vancomycin resistant Enterococcus sp.: Nondet      Method Type: PCR        Lactate, Blood: 1.3 mmol/L (19 @ 05:30)  Lactate, Blood: 1.7 mmol/L (19 @ 23:30)  Blood Gas Venous - Lactate: 1.6 mmoL/L (19 @ 23:10)      INFECTIOUS DISEASES TESTING  Hepatitis C Virus Interpretation: Nonreact (19 @ 05:30)      RADIOLOGY & ADDITIONAL TESTS:  I have personally reviewed the last Chest xray  CXR      CT      CARDIOLOGY TESTING  12 Lead ECG:   Ventricular Rate 78 BPM    Atrial Rate 78 BPM    P-R Interval 172 ms    QRS Duration 94 ms    Q-T Interval 404 ms    QTC Calculation(Bezet) 460 ms    P Axis 48 degrees    R Axis 80 degrees    T Axis 70 degrees    Diagnosis Line Normal sinus rhythm  Possible Left atrial enlargement  Incomplete right bundle branch block  Possible Septal infarct , age undetermined  Abnormal ECG    Confirmed by DEL VILLALPANDO MD ([id number]) on 2019 11:39:54 AM (19 @ 06:40)  12 Lead ECG:   Ventricular Rate 98 BPM    Atrial Rate 98 BPM    P-R Interval 148 ms    QRS Duration 80 ms    Q-T Interval 330 ms    QTC Calculation(Bezet) 421 ms    P Axis 75 degrees    R Axis 115 degrees    T Axis 148 degrees    Diagnosis Line Normal sinus rhythm  Right axis deviation  Poor R wave progression    Confirmed by DEL VILLALPANDO MD ([id number]) on 2019 11:39:36 AM (19 @ 01:05)      MEDICATIONS  cefepime   IVPB 2000  chlorhexidine 4% Liquid 1  furosemide   Injectable 40  heparin  Injectable 5000  lisinopril 40  methylPREDNISolone sodium succinate Injectable 40  metoprolol tartrate 25  pantoprazole    Tablet 40  silver sulfADIAZINE 1% Cream 1  tamsulosin 0.4  vancomycin  IVPB 2000      ANTIBIOTICS:  cefepime   IVPB 2000 milliGRAM(s) IV Intermittent every 8 hours  vancomycin  IVPB 2000 milliGRAM(s) IV Intermittent every 12 hours

## 2019-08-20 NOTE — PROGRESS NOTE ADULT - SUBJECTIVE AND OBJECTIVE BOX
60yo Male w/ PMH of HTN, COPD (on Home O2 as needed), CHF BIBA c/o SOB that started day prior to admission . Associated w/ cough w/ clear sputum. Pt reports chronically having SOB and cough due to COPD, but reports new onset of more severe SOB  Pt also reports having fever/chills. Pt thinks he has an infection from his legs. + severe chronic venous stasis and ulcers b/l. Pt sees vascular surgeon weekly for his condition. In ED, pt was in acute distress due to dyspnea. BNP 3100s noted. CXR shows vascular congestion b/l. IV lasix 80mg given and BIPAP applied w/ great relief. Pt was very hypertensive w/ SBP > 200, nitro drip started. Pt was febrile w/ Tmax of 103.4. Pt denies any CP, palpitation, abd pain, N/V/D, dysuria, calf pain, or dizziness/lightheadedness. now in CCU - sepesis gram -     PAST MEDICAL & SURGICAL HISTORY:  Chronic CHF  COPD, severe  Hypertension    MEDICATIONS  (STANDING):  cefepime   IVPB 2000 milliGRAM(s) IV Intermittent every 12 hours  chlorhexidine 4% Liquid 1 Application(s) Topical two times a day  furosemide   Injectable 40 milliGRAM(s) IV Push two times a day  heparin  Injectable 5000 Unit(s) SubCutaneous every 8 hours  metoprolol tartrate 25 milliGRAM(s) Oral two times a day  pantoprazole    Tablet 40 milliGRAM(s) Oral before breakfast  silver sulfADIAZINE 1% Cream 1 Application(s) Topical two times a day  tamsulosin 0.4 milliGRAM(s) Oral at bedtime    MEDICATIONS  (PRN):  ALBUTerol/ipratropium for Nebulization 3 milliLiter(s) Nebulizer every 6 hours PRN Bronchospasm    ICU Vital Signs Last 24 Hrs  T(C): 35.9 (20 Aug 2019 07:05), Max: 36.5 (19 Aug 2019 23:11)  T(F): 96.6 (20 Aug 2019 07:05), Max: 97.7 (19 Aug 2019 23:11)  HR: 51 (20 Aug 2019 07:13) (49 - 68)  BP: 96/57 (20 Aug 2019 07:13) (85/52 - 117/53)  BP(mean): 72 (20 Aug 2019 07:13) (63 - 77)  ABP: --  ABP(mean): --  RR: 32 (20 Aug 2019 07:13) (18 - 32)  SpO2: 92% (20 Aug 2019 07:13) (90% - 96%)    GENERAL :  up and alert sitting in chair   HEENT: PERRLA EOMI  LUNGS: decr bs   CARD: s1 s2 rrr  abd: obese   ext : wrapped   neuro - Alert Awake Ox 3                             16.4   21.34 )-----------( 126      ( 20 Aug 2019 06:22 )             58.9   08-20    140  |  97<L>  |  36<H>  ----------------------------<  155<H>  5.6<H>   |  33<H>  |  1.8<H>    Ca    8.8      20 Aug 2019 06:22  Phos  3.3     08-19  Mg     2.1     08-20    TPro  6.7  /  Alb  3.2<L>  /  TBili  1.9<H>  /  DBili  x   /  AST  17  /  ALT  12  /  AlkPhos  69  08-19

## 2019-08-20 NOTE — PROGRESS NOTE ADULT - ASSESSMENT
IMPRESSION:  ARF on CRF hypercapnia   CHF   ?? cellulitis lower ext   doubt PNA no sign     PLAN:    CNS: no sedation     HEENT: oral care     PULMONARY: AVAP as needed and at night   nebulizer Q 4 hrs and prn   d/c solumedrol   prednisone 40 mg daily and taper     CARDIOVASCULAR: echo , cardiology consult   continue lasix monitor IS and OS restrict fluid     GI: GI prophylaxis.     RENAL: follow lytes IS and os monitor bun, cr   BMP afternoon     INFECTIOUS DISEASE: d/c vanco repeat bld cx follow ID on cefepime     HEMATOLOGICAL:  DVT prophylaxis. doppler negative     ENDOCRINE:  Follow up FS.  Insulin protocol if needed.    MUSCULOSKELETAL:        CRITICAL CARE TIME SPENT: *** IMPRESSION:  ARF on CRF hypercapnia   CHF   ?? cellulitis lower ext   doubt PNA no sign   bacteremia  PLAN:    CNS: no sedation     HEENT: oral care     PULMONARY: AVAP as needed and at night   nebulizer Q 4 hrs and prn   d/c solumedrol   prednisone 40 mg daily and taper     CARDIOVASCULAR: echo , cardiology consult   continue lasix monitor IS and OS restrict fluid     GI: GI prophylaxis.     RENAL: follow lytes IS and os monitor bun, cr   BMP afternoon     INFECTIOUS DISEASE: d/c vanco repeat bld cx follow ID on cefepime     HEMATOLOGICAL:  DVT prophylaxis. doppler negative     ENDOCRINE:  Follow up FS.  Insulin protocol if needed.    MUSCULOSKELETAL:        CRITICAL CARE TIME SPENT: *** IMPRESSION:  ARF on CRF hypercapnia   CHF   ?? cellulitis lower ext   doubt PNA no sign   bacteremia  PLAN:    CNS: no sedation     HEENT: oral care     PULMONARY: AVAP as needed and at night   nebulizer Q 4 hrs and prn   d/c solumedrol   prednisone 40 mg daily and taper     CARDIOVASCULAR: echo , cardiology consult   continue lasix monitor IS and OS restrict fluid     GI: GI prophylaxis.     RENAL: follow lytes IS and os monitor bun, cr   BMP afternoon   hold ACE   follow K   INFECTIOUS DISEASE: d/c vanco repeat bld cx follow ID on cefepime     HEMATOLOGICAL:  DVT prophylaxis. doppler negative     ENDOCRINE:  Follow up FS.  Insulin protocol if needed.    MUSCULOSKELETAL:        CRITICAL CARE TIME SPENT: ***

## 2019-08-20 NOTE — PROGRESS NOTE ADULT - SUBJECTIVE AND OBJECTIVE BOX
Patient is a 59y old  Male who presents with a chief complaint of CHF exacerbation (20 Aug 2019 06:14)      Over Night Events:  Patient seen and examined feel much better sitting in chair eating        ROS:  See HPI    PHYSICAL EXAM    ICU Vital Signs Last 24 Hrs  T(C): 35.9 (20 Aug 2019 07:05), Max: 36.5 (19 Aug 2019 23:11)  T(F): 96.6 (20 Aug 2019 07:05), Max: 97.7 (19 Aug 2019 23:11)  HR: 51 (20 Aug 2019 07:13) (49 - 68)  BP: 96/57 (20 Aug 2019 07:13) (85/52 - 117/53)  BP(mean): 72 (20 Aug 2019 07:13) (63 - 77)  ABP: --  ABP(mean): --  RR: 32 (20 Aug 2019 07:13) (18 - 32)  SpO2: 92% (20 Aug 2019 07:13) (90% - 96%)      General: Aox3  HEENT:      lashae          Lymph Nodes: NO cervical LN   Lungs: Bilateral BS  Cardiovascular: Regular   Abdomen: Soft, Positive BS  Extremities: No clubbing , 3 edema redness   Skin: warm   Neurological: no focal deficit   Musculoskeletal: move all ext     I&O's Detail    19 Aug 2019 07:01  -  20 Aug 2019 07:00  --------------------------------------------------------  IN:    Oral Fluid: 540 mL    Solution: 1000 mL    Solution: 150 mL    Solution: 50 mL  Total IN: 1740 mL    OUT:    Indwelling Catheter - Urethral: 1500 mL  Total OUT: 1500 mL    Total NET: 240 mL          LABS:                          15.9   21.53 )-----------( 150      ( 19 Aug 2019 05:30 )             54.2         20 Aug 2019 06:22    140    |  97     |  36     ----------------------------<  155    5.6     |  33     |  1.8      Ca    8.8        20 Aug 2019 06:22  Phos  3.3       19 Aug 2019 05:30  Mg     2.1       20 Aug 2019 06:22                                                                                      Urinalysis Basic - ( 19 Aug 2019 00:30 )    Color: See Note / Appearance: Cloudy / S.020 / pH: x  Gluc: x / Ketone: Negative  / Bili: Negative / Urobili: 1.0 mg/dL   Blood: x / Protein: 100 mg/dL / Nitrite: Negative   Leuk Esterase: Negative / RBC: >50 /HPF / WBC 1-2 /HPF   Sq Epi: x / Non Sq Epi: Occasional /HPF / Bacteria: Few        Lactate, Blood: 1.3 mmol/L (19 @ 05:30)  Lactate, Blood: 1.7 mmol/L (19 @ 23:30)    CARDIAC MARKERS ( 19 Aug 2019 11:45 )  x     / <0.01 ng/mL / x     / x     / x      CARDIAC MARKERS ( 18 Aug 2019 23:30 )  x     / <0.01 ng/mL / x     / x     / x                                                            Culture - Blood (collected 18 Aug 2019 23:30)  Source: .Blood Blood  Gram Stain (20 Aug 2019 02:55):    Growth in aerobic bottle: Gram Negative Rods  Preliminary Report (20 Aug 2019 02:55):    Growth in aerobic bottle: Gram Negative Rods    Culture - Blood (collected 18 Aug 2019 23:30)  Source: .Blood Blood  Gram Stain (20 Aug 2019 02:26):    Growth in aerobic bottle: Gram Negative Rods  Preliminary Report (20 Aug 2019 02:26):    Growth in aerobic bottle: Gram Negative Rods    "Due to technical problems, Proteus sp. will Not be reported as part of    the BCID panel until further notice"    ***Blood Panel PCR results on this specimen are available    approximately 3 hours after the Gram stain result.***    Gram stain, PCR, and/or culture results may not always    correspond due to difference in methodologies.    ************************************************************    This PCR assay was performed using [employer].    The following targets are tested for: Enterococcus,    vancomycin resistant enterococci, Listeria monocytogenes,    coagulase negative staphylococci, S. aureus,    methicillin resistant S. aureus, Streptococcus agalactiae    (Group B), S. pneumoniae, S. pyogenes (Group A),    Acinetobacter baumannii, Enterobacter cloacae, E. coli,    Klebsiella oxytoca, K. pneumoniae, Proteus sp.,    Serratia marcescens, Haemophilus influenzae,    Neisseria meningitidis, Pseudomonas aeruginosa, Candida    albicans, C. glabrata, C krusei, C parapsilosis,    C. tropicalis and the KPC resistance gene.  Organism: Blood Culture PCR (20 Aug 2019 04:32)  Organism: Blood Culture PCR (20 Aug 2019 04:32)                                                                                       ABG - ( 20 Aug 2019 06:18 )  pH, Arterial: 7.29  pH, Blood: x     /  pCO2: 71    /  pO2: 59    / HCO3: 34    / Base Excess: 4.2   /  SaO2: 89                  MEDICATIONS  (STANDING):  cefepime   IVPB 2000 milliGRAM(s) IV Intermittent every 8 hours  chlorhexidine 4% Liquid 1 Application(s) Topical two times a day  furosemide   Injectable 40 milliGRAM(s) IV Push two times a day  heparin  Injectable 5000 Unit(s) SubCutaneous every 8 hours  lisinopril 40 milliGRAM(s) Oral daily  methylPREDNISolone sodium succinate Injectable 40 milliGRAM(s) IV Push two times a day  metoprolol tartrate 25 milliGRAM(s) Oral two times a day  pantoprazole    Tablet 40 milliGRAM(s) Oral before breakfast  silver sulfADIAZINE 1% Cream 1 Application(s) Topical two times a day  tamsulosin 0.4 milliGRAM(s) Oral at bedtime    MEDICATIONS  (PRN):  ALBUTerol/ipratropium for Nebulization 3 milliLiter(s) Nebulizer every 6 hours PRN Bronchospasm          Xrays:  TLC:  OG:  ET tube:                                                                                    b/l congestion and effusion    ECHO:  [facility] ICU:    :

## 2019-08-20 NOTE — PROGRESS NOTE ADULT - SUBJECTIVE AND OBJECTIVE BOX
Patient is a 59y old  Male who presents with a chief complaint of CHF exacerbation (20 Aug 2019 08:25)      T(F): 96.6 (08-20-19 @ 07:05), Max: 97.7 (08-19-19 @ 23:11)  HR: 51 (08-20-19 @ 07:13)  BP: 96/57 (08-20-19 @ 07:13)  RR: 32 (08-20-19 @ 07:13)  SpO2: 92% (08-20-19 @ 07:13) (90% - 96%)    PHYSICAL EXAM:  GENERAL: NAD, well-groomed, well-developed  HEAD:  Atraumatic, Normocephalic  EYES: EOMI, PERRLA, conjunctiva and sclera clear  ENMT: No tonsillar erythema, exudates, or enlargement; Moist mucous membranes, Good dentition, No lesions  NECK: Supple, No JVD, Normal thyroid  NERVOUS SYSTEM:  Alert & Oriented X3,  Motor Strength 5/5 B/L upper and lower extremities  CHEST/LUNG: Clear to percussion bilaterally; No rales, rhonchi, wheezing, or rubs  HEART: Regular rate and rhythm; No murmurs, rubs, or gallops  ABDOMEN: Soft, Nontender, Nondistended; Bowel sounds present  EXTREMITIES:   No clubbing, cyanosis, 1 leg edema   LYMPH: No lymphadenopathy noted  SKIN: No rashes or lesions    labs  08-20    140  |  97<L>  |  36<H>  ----------------------------<  155<H>  5.6<H>   |  33<H>  |  1.8<H>    Ca    8.8      20 Aug 2019 06:22  Phos  3.3     08-19  Mg     2.1     08-20    TPro  6.7  /  Alb  3.2<L>  /  TBili  1.9<H>  /  DBili  x   /  AST  17  /  ALT  12  /  AlkPhos  69  08-19                          16.4   21.34 )-----------( 126      ( 20 Aug 2019 06:22 )             58.9       Culture - Blood (collected 18 Aug 2019 23:30)  Source: .Blood Blood  Gram Stain (20 Aug 2019 02:55):    Growth in aerobic bottle: Gram Negative Rods  Preliminary Report (20 Aug 2019 02:55):    Growth in aerobic bottle: Gram Negative Rods    Culture - Blood (collected 18 Aug 2019 23:30)  Source: .Blood Blood  Gram Stain (20 Aug 2019 02:26):    Growth in aerobic bottle: Gram Negative Rods  Preliminary Report (20 Aug 2019 02:26):    Growth in aerobic bottle: Gram Negative Rods    "Due to technical problems, Proteus sp. will Not be reported as part of    the BCID panel until further notice"    ***Blood Panel PCR results on this specimen are available    approximately 3 hours after the Gram stain result.***    Gram stain, PCR, and/or culture results may not always    correspond due to difference in methodologies.    ************************************************************    This PCR assay was performed using [employer].    The following targets are tested for: Enterococcus,    vancomycin resistant enterococci, Listeria monocytogenes,    coagulase negative staphylococci, S. aureus,    methicillin resistant S. aureus, Streptococcus agalactiae    (Group B), S. pneumoniae, S. pyogenes (Group A),    Acinetobacter baumannii, Enterobacter cloacae, E. coli,    Klebsiella oxytoca, K. pneumoniae, Proteus sp.,    Serratia marcescens, Haemophilus influenzae,    Neisseria meningitidis, Pseudomonas aeruginosa, Candida    albicans, C. glabrata, C krusei, C parapsilosis,    C. tropicalis and the KPC resistance gene.  Organism: Blood Culture PCR (20 Aug 2019 04:32)  Organism: Blood Culture PCR (20 Aug 2019 04:32)          Troponin T, Serum: <0.01 ng/mL (08-19-19 @ 11:45)      ALBUTerol/ipratropium for Nebulization 3 milliLiter(s) Nebulizer every 6 hours PRN  cefepime   IVPB 2000 milliGRAM(s) IV Intermittent every 8 hours  chlorhexidine 4% Liquid 1 Application(s) Topical two times a day  heparin  Injectable 5000 Unit(s) SubCutaneous every 8 hours  lisinopril 40 milliGRAM(s) Oral daily  methylPREDNISolone sodium succinate Injectable 40 milliGRAM(s) IV Push two times a day  metoprolol tartrate 25 milliGRAM(s) Oral two times a day  pantoprazole    Tablet 40 milliGRAM(s) Oral before breakfast  silver sulfADIAZINE 1% Cream 1 Application(s) Topical two times a day  tamsulosin 0.4 milliGRAM(s) Oral at bedtime

## 2019-08-21 ENCOUNTER — APPOINTMENT (OUTPATIENT)
Dept: VASCULAR SURGERY | Facility: CLINIC | Age: 59
End: 2019-08-21

## 2019-08-21 LAB
-  AMIKACIN: SIGNIFICANT CHANGE UP
-  AZTREONAM: SIGNIFICANT CHANGE UP
-  CEFEPIME: SIGNIFICANT CHANGE UP
-  CEFTRIAXONE: SIGNIFICANT CHANGE UP
-  CIPROFLOXACIN: SIGNIFICANT CHANGE UP
-  GENTAMICIN: SIGNIFICANT CHANGE UP
-  LEVOFLOXACIN: SIGNIFICANT CHANGE UP
-  MEROPENEM: SIGNIFICANT CHANGE UP
-  PIPERACILLIN/TAZOBACTAM: SIGNIFICANT CHANGE UP
-  TOBRAMYCIN: SIGNIFICANT CHANGE UP
-  TRIMETHOPRIM/SULFAMETHOXAZOLE: SIGNIFICANT CHANGE UP
ANION GAP SERPL CALC-SCNC: 9 MMOL/L — SIGNIFICANT CHANGE UP (ref 7–14)
BASOPHILS # BLD AUTO: 0.02 K/UL — SIGNIFICANT CHANGE UP (ref 0–0.2)
BASOPHILS NFR BLD AUTO: 0.1 % — SIGNIFICANT CHANGE UP (ref 0–1)
BUN SERPL-MCNC: 52 MG/DL — HIGH (ref 10–20)
CALCIUM SERPL-MCNC: 8.6 MG/DL — SIGNIFICANT CHANGE UP (ref 8.5–10.1)
CHLORIDE SERPL-SCNC: 99 MMOL/L — SIGNIFICANT CHANGE UP (ref 98–110)
CO2 SERPL-SCNC: 33 MMOL/L — HIGH (ref 17–32)
CREAT SERPL-MCNC: 2.1 MG/DL — HIGH (ref 0.7–1.5)
CULTURE RESULTS: SIGNIFICANT CHANGE UP
CULTURE RESULTS: SIGNIFICANT CHANGE UP
EOSINOPHIL # BLD AUTO: 0 K/UL — SIGNIFICANT CHANGE UP (ref 0–0.7)
EOSINOPHIL NFR BLD AUTO: 0 % — SIGNIFICANT CHANGE UP (ref 0–8)
GLUCOSE BLDC GLUCOMTR-MCNC: 114 MG/DL — HIGH (ref 70–99)
GLUCOSE BLDC GLUCOMTR-MCNC: 133 MG/DL — HIGH (ref 70–99)
GLUCOSE BLDC GLUCOMTR-MCNC: 134 MG/DL — HIGH (ref 70–99)
GLUCOSE SERPL-MCNC: 125 MG/DL — HIGH (ref 70–99)
HCT VFR BLD CALC: 56 % — HIGH (ref 42–52)
HGB BLD-MCNC: 15.7 G/DL — SIGNIFICANT CHANGE UP (ref 14–18)
IMM GRANULOCYTES NFR BLD AUTO: 0.8 % — HIGH (ref 0.1–0.3)
LYMPHOCYTES # BLD AUTO: 0.37 K/UL — LOW (ref 1.2–3.4)
LYMPHOCYTES # BLD AUTO: 1.9 % — LOW (ref 20.5–51.1)
MAGNESIUM SERPL-MCNC: 2.3 MG/DL — SIGNIFICANT CHANGE UP (ref 1.8–2.4)
MCHC RBC-ENTMCNC: 25.7 PG — LOW (ref 27–31)
MCHC RBC-ENTMCNC: 28 G/DL — LOW (ref 32–37)
MCV RBC AUTO: 91.7 FL — SIGNIFICANT CHANGE UP (ref 80–94)
METHOD TYPE: SIGNIFICANT CHANGE UP
MONOCYTES # BLD AUTO: 1.2 K/UL — HIGH (ref 0.1–0.6)
MONOCYTES NFR BLD AUTO: 6.1 % — SIGNIFICANT CHANGE UP (ref 1.7–9.3)
NEUTROPHILS # BLD AUTO: 17.88 K/UL — HIGH (ref 1.4–6.5)
NEUTROPHILS NFR BLD AUTO: 91.1 % — HIGH (ref 42.2–75.2)
NRBC # BLD: 0 /100 WBCS — SIGNIFICANT CHANGE UP (ref 0–0)
ORGANISM # SPEC MICROSCOPIC CNT: SIGNIFICANT CHANGE UP
PLATELET # BLD AUTO: 160 K/UL — SIGNIFICANT CHANGE UP (ref 130–400)
POTASSIUM SERPL-MCNC: 5 MMOL/L — SIGNIFICANT CHANGE UP (ref 3.5–5)
POTASSIUM SERPL-SCNC: 5 MMOL/L — SIGNIFICANT CHANGE UP (ref 3.5–5)
RBC # BLD: 6.11 M/UL — HIGH (ref 4.7–6.1)
RBC # FLD: 17.8 % — HIGH (ref 11.5–14.5)
SODIUM SERPL-SCNC: 141 MMOL/L — SIGNIFICANT CHANGE UP (ref 135–146)
SPECIMEN SOURCE: SIGNIFICANT CHANGE UP
SPECIMEN SOURCE: SIGNIFICANT CHANGE UP
WBC # BLD: 19.63 K/UL — HIGH (ref 4.8–10.8)
WBC # FLD AUTO: 19.63 K/UL — HIGH (ref 4.8–10.8)

## 2019-08-21 PROCEDURE — 71045 X-RAY EXAM CHEST 1 VIEW: CPT | Mod: 26

## 2019-08-21 PROCEDURE — 71250 CT THORAX DX C-: CPT | Mod: 26

## 2019-08-21 RX ORDER — FUROSEMIDE 40 MG
40 TABLET ORAL DAILY
Refills: 0 | Status: DISCONTINUED | OUTPATIENT
Start: 2019-08-22 | End: 2019-08-22

## 2019-08-21 RX ADMIN — HEPARIN SODIUM 5000 UNIT(S): 5000 INJECTION INTRAVENOUS; SUBCUTANEOUS at 13:24

## 2019-08-21 RX ADMIN — TAMSULOSIN HYDROCHLORIDE 0.4 MILLIGRAM(S): 0.4 CAPSULE ORAL at 21:15

## 2019-08-21 RX ADMIN — Medication 40 MILLIGRAM(S): at 05:53

## 2019-08-21 RX ADMIN — CEFEPIME 100 MILLIGRAM(S): 1 INJECTION, POWDER, FOR SOLUTION INTRAMUSCULAR; INTRAVENOUS at 05:52

## 2019-08-21 RX ADMIN — PANTOPRAZOLE SODIUM 40 MILLIGRAM(S): 20 TABLET, DELAYED RELEASE ORAL at 05:57

## 2019-08-21 RX ADMIN — CHLORHEXIDINE GLUCONATE 1 APPLICATION(S): 213 SOLUTION TOPICAL at 10:56

## 2019-08-21 RX ADMIN — HEPARIN SODIUM 5000 UNIT(S): 5000 INJECTION INTRAVENOUS; SUBCUTANEOUS at 21:15

## 2019-08-21 RX ADMIN — CEFEPIME 100 MILLIGRAM(S): 1 INJECTION, POWDER, FOR SOLUTION INTRAMUSCULAR; INTRAVENOUS at 17:43

## 2019-08-21 RX ADMIN — HEPARIN SODIUM 5000 UNIT(S): 5000 INJECTION INTRAVENOUS; SUBCUTANEOUS at 05:52

## 2019-08-21 RX ADMIN — Medication 1 APPLICATION(S): at 05:54

## 2019-08-21 RX ADMIN — CHLORHEXIDINE GLUCONATE 1 APPLICATION(S): 213 SOLUTION TOPICAL at 17:45

## 2019-08-21 RX ADMIN — Medication 1 APPLICATION(S): at 17:44

## 2019-08-21 RX ADMIN — Medication 25 MILLIGRAM(S): at 17:44

## 2019-08-21 NOTE — PROGRESS NOTE ADULT - SUBJECTIVE AND OBJECTIVE BOX
58yo Male w/ PMH of HTN, COPD (on Home O2 as needed), CHF BIBA c/o SOB that started day prior to admission . Associated w/ cough w/ clear sputum. Pt reports chronically having SOB and cough due to COPD, but reports new onset of more severe SOB  Pt also reports having fever/chills. Pt thinks he has an infection from his legs. + severe chronic venous stasis and ulcers b/l. Pt sees vascular surgeon weekly for his condition. In ED, pt was in acute distress due to dyspnea. BNP 3100s noted. CXR shows vascular congestion b/l. IV lasix 80mg given and BIPAP applied w/ great relief. Pt was very hypertensive w/ SBP > 200, nitro drip started. Pt was febrile w/ Tmax of 103.4. Pt denies any CP, palpitation, abd pain, N/V/D, dysuria, calf pain, or dizziness/lightheadedness. now in CCU - sepesis gram -     interval - noted seen by renal    PAST MEDICAL & SURGICAL HISTORY:  Chronic CHF  COPD, severe  Hypertension    MEDICATIONS  (STANDING):  cefepime   IVPB 2000 milliGRAM(s) IV Intermittent every 12 hours  chlorhexidine 4% Liquid 1 Application(s) Topical two times a day  heparin  Injectable 5000 Unit(s) SubCutaneous every 8 hours  metoprolol tartrate 25 milliGRAM(s) Oral two times a day  pantoprazole    Tablet 40 milliGRAM(s) Oral before breakfast  predniSONE   Tablet 40 milliGRAM(s) Oral daily  silver sulfADIAZINE 1% Cream 1 Application(s) Topical two times a day  tamsulosin 0.4 milliGRAM(s) Oral at bedtime    MEDICATIONS  (PRN):  ALBUTerol/ipratropium for Nebulization 3 milliLiter(s) Nebulizer every 6 hours PRN Bronchospasm    ICU Vital Signs Last 24 Hrs  T(C): 36.3 (21 Aug 2019 19:00), Max: 36.4 (21 Aug 2019 15:00)  T(F): 97.3 (21 Aug 2019 19:00), Max: 97.5 (21 Aug 2019 15:00)  HR: 56 (21 Aug 2019 21:02) (46 - 70)  BP: 122/69 (21 Aug 2019 21:02) (90/54 - 126/63)  BP(mean): 89 (21 Aug 2019 21:02) (65 - 90)  ABP: --  ABP(mean): --  RR: 25 (21 Aug 2019 21:02) (20 - 324)  SpO2: 95% (21 Aug 2019 21:02) (89% - 99%)    CAPILLARY BLOOD GLUCOSE      POCT Blood Glucose.: 133 mg/dL (21 Aug 2019 12:13)  POCT Blood Glucose.: 114 mg/dL (21 Aug 2019 05:52)  POCT Blood Glucose.: 134 mg/dL (21 Aug 2019 00:26)    GENERAL :  up and alert sitting in chair   HEENT: PERADALA EOMI  LUNGS: decr bs   CARD: s1 s2 rrr  abd: obese   ext : wrapped   neuro - Alert Awake Ox 3                           15.7   19.63 )-----------( 160      ( 21 Aug 2019 06:07 )             56.0   08-21    141  |  99  |  52<H>  ----------------------------<  125<H>  5.0   |  33<H>  |  2.1<H>    Ca    8.6      21 Aug 2019 06:07  Mg     2.3     08-21

## 2019-08-21 NOTE — PROGRESS NOTE ADULT - ASSESSMENT
Check BC RX priscilla. Out greater than in . Check lytes  , Echo mod lvh EF 55%. D/C broussard if stable  Prognosis guarded. Patient aware

## 2019-08-21 NOTE — PROGRESS NOTE ADULT - SUBJECTIVE AND OBJECTIVE BOX
Patient is a 59y old  Male who presents with a chief complaint of CHF exacerbation (20 Aug 2019 11:57)      T(F): 97.5 (08-20-19 @ 23:01), Max: 97.5 (08-20-19 @ 23:01)  HR: 46 (08-20-19 @ 23:30)  BP: 109/58 (08-20-19 @ 23:01)  RR: 30 (08-20-19 @ 23:01)  SpO2: 99% (08-20-19 @ 23:30) (92% - 99%)    PHYSICAL EXAM:  GENERAL: NAD, well-groomed, well-developed  HEAD:  Atraumatic, Normocephalic  EYES: EOMI, PERRLA, conjunctiva and sclera clear  ENMT: No tonsillar erythema, exudates, or enlargement; Moist mucous membranes, Good dentition, No lesions  NECK: Supple, No JVD, Normal thyroid  NERVOUS SYSTEM:  Alert & Oriented X3,  Motor Strength 5/5 B/L upper and lower extremities  CHEST/LUNG: Clear to percussion bilaterally; No rales, rhonchi, wheezing, or rubs Decreased bs  HEART: Regular rate and rhythm; No murmurs, rubs, or gallops  ABDOMEN: Soft, Nontender, Nondistended; Bowel sounds present  EXTREMITIES:   No clubbing, cyanosis, wrapped  LYMPH: No lymphadenopathy noted  SKIN: No rashes or lesions    labs  08-20    141  |  98  |  40<H>  ----------------------------<  163<H>  4.9   |  32  |  1.7<H>    Ca    8.5      20 Aug 2019 11:47  Mg     2.0     08-20                            16.4   21.34 )-----------( 126      ( 20 Aug 2019 06:22 )             58.9       Culture - Blood (collected 18 Aug 2019 23:30)  Source: .Blood Blood  Gram Stain (20 Aug 2019 02:55):    Growth in aerobic bottle: Gram Negative Rods  Preliminary Report (20 Aug 2019 19:56):    Growth in aerobic bottle: Pseudomonas stutzeri    See previous culture [id number]    Culture - Blood (collected 18 Aug 2019 23:30)  Source: .Blood Blood  Gram Stain (20 Aug 2019 02:26):    Growth in aerobic bottle: Gram Negative Rods  Preliminary Report (20 Aug 2019 19:53):    Growth in aerobic bottle: Pseudomonas stutzeri    "Due to technical problems, Proteus sp. will Not be reported as part of    the BCID panel until further notice"    ***Blood Panel PCR results on this specimen are available    approximately 3 hours after the Gram stain result.***    Gram stain, PCR, and/or culture results may not always    correspond due to difference in methodologies.    ************************************************************    This PCR assay was performed using [employer].    The following targets are tested for: Enterococcus,    vancomycin resistant enterococci, Listeria monocytogenes,    coagulase negative staphylococci, S. aureus,    methicillin resistant S. aureus, Streptococcus agalactiae    (Group B), S. pneumoniae, S. pyogenes (Group A),    Acinetobacter baumannii, Enterobacter cloacae, E. coli,    Klebsiella oxytoca, K. pneumoniae, Proteus sp.,    Serratia marcescens, Haemophilus influenzae,    Neisseria meningitidis, Pseudomonas aeruginosa, Candida    albicans, C. glabrata, C krusei, C parapsilosis,    C. tropicalis and the KPC resistance gene.  Organism: Blood Culture PCR (20 Aug 2019 04:32)  Organism: Blood Culture PCR (20 Aug 2019 04:32)              ALBUTerol/ipratropium for Nebulization 3 milliLiter(s) Nebulizer every 6 hours PRN  cefepime   IVPB 2000 milliGRAM(s) IV Intermittent every 12 hours  chlorhexidine 4% Liquid 1 Application(s) Topical two times a day  furosemide   Injectable 40 milliGRAM(s) IV Push two times a day  heparin  Injectable 5000 Unit(s) SubCutaneous every 8 hours  metoprolol tartrate 25 milliGRAM(s) Oral two times a day  pantoprazole    Tablet 40 milliGRAM(s) Oral before breakfast  predniSONE   Tablet 40 milliGRAM(s) Oral daily  silver sulfADIAZINE 1% Cream 1 Application(s) Topical two times a day  tamsulosin 0.4 milliGRAM(s) Oral at bedtime

## 2019-08-21 NOTE — PROGRESS NOTE ADULT - ASSESSMENT
60yo Male w/ PMH of HTN, COPD (on Home O2 as needed), CHF BIBA c/o SOB     #) Acute Hypoxic resp failure 2/2 Acute decompensated HF  - Upgrade to MICU  - CXR shows congestive HF signs, BNP 3162  - Echo 08/2019 was suboptimal study shows Normal EF with LV Moderate concentric hypertrophy  - Duplex b/l LE neg  - Switch Lasix 40mg daily IV  - Monitor I & O, Cr, and daily weight  - Keep Avelar in place  - F/U CT chest w/o contrast    #) Acute hypercapnic respiratory failure secondary to COPD exacerbation with chronic CO2 retention 2/2 OHS and COPD  - Respiratory acidosis on presentation  - Improved on AVAPS, cont 4 on 4 off and QHS  - ABG in am  - Keep O2 88-92%  - Prednisone 40mg daily 3/5 days  - Neb q4hr and PRN    #) Sepsis secondary to Pseudomonas stutzeri bacteremia and LE cellulitis  - T max 102 with leukocytosis and normal lactate on presentatio  - Afebrile 48hrs  - UA neg  - Blood culture shows Pseudomonas stutzeri. Pending sensitivity  - Cont Cefepime 2000mg Q12 and d/c Vancomycin  - F/U repeat Blood culture  - F/U ID recommendations  - Burn Consult    #) OLIVE on CKD  - Cr today 2.1 (baseline 1.3)  - Possible Cardiorenal vs Diuretic induced vs Hypotension induced vs Vancomycin induced  - Hold lisinopril  - FeUrea 13% likely prerenal  - Nephrology consulted    #) Hyperkalemia resolved  - Likely Secondary ro OLIVE  - Hold Lisinopril  - Insulin and Dextrose  - F/U repeat BMP    DVT ppx Heparin Sq  DASH diet with Fluid restriction

## 2019-08-21 NOTE — PROGRESS NOTE ADULT - ASSESSMENT
IMPRESSION:  ARF on CRF hypercapnia   OLIVE   HFpEF   Pseudomonas bacteremia   cellulitis lower ext   HO AZIZA/OHS     PLAN:    CNS: no sedation     HEENT: oral care     PULMONARY: AVAPs as needed and at night; nebulizer Q 4 hrs and prn; prednisone 40 mg daily for 5 days total    CARDIOVASCULAR: Cardio eval;  Lasix IV q 24 hrs; Keep I=<O; Target MAP > 65     GI: GI prophylaxis.     RENAL: follow lytes IS and os;  BMP afternoon     INFECTIOUS DISEASE: c/w cefepime for now; FU pseudomonas sensitivities;     HEMATOLOGICAL:  DVT prophylaxis.    ENDOCRINE:  Follow up FS.  Insulin protocol if needed.    MUSCULOSKELETAL: Wound care of LE; Burn eval    MICU eval

## 2019-08-21 NOTE — PROGRESS NOTE ADULT - SUBJECTIVE AND OBJECTIVE BOX
SUBJECTIVE:    Patient is a 59y old Male who presents with a chief complaint of CHF exacerbation (21 Aug 2019 09:08)    Currently admitted to medicine with the primary diagnosis of Acute decompensated heart failure     Today is hospital day 2d. This morning he is resting comfortably in bed and reports no new issues or overnight events.     PAST MEDICAL & SURGICAL HISTORY  Chronic CHF  COPD, severe  Hypertension    SOCIAL HISTORY:  Negative for smoking/alcohol/drug use.     Home Medications:  Home Medications:  furosemide 40 mg oral tablet: 1 tab(s) orally once a day (19 Aug 2019 02:14)  lisinopril 40 mg oral tablet: 1 tab(s) orally once a day (19 Aug 2019 02:14)  metoprolol tartrate 25 mg oral tablet: 1 tab(s) orally 2 times a day (19 Aug 2019 02:14)  tamsulosin 0.4 mg oral capsule: 1 cap(s) orally once a day (19 Aug 2019 02:14)      ALLERGIES:  Allergy Status Unknown    MEDICATIONS:  STANDING MEDICATIONS  cefepime   IVPB 2000 milliGRAM(s) IV Intermittent every 12 hours  chlorhexidine 4% Liquid 1 Application(s) Topical two times a day  heparin  Injectable 5000 Unit(s) SubCutaneous every 8 hours  metoprolol tartrate 25 milliGRAM(s) Oral two times a day  pantoprazole    Tablet 40 milliGRAM(s) Oral before breakfast  predniSONE   Tablet 40 milliGRAM(s) Oral daily  silver sulfADIAZINE 1% Cream 1 Application(s) Topical two times a day  tamsulosin 0.4 milliGRAM(s) Oral at bedtime    PRN MEDICATIONS  ALBUTerol/ipratropium for Nebulization 3 milliLiter(s) Nebulizer every 6 hours PRN    VITALS:   Vital Signs Last 24 Hrs  T(C): 35.7 (21 Aug 2019 07:01), Max: 36.4 (20 Aug 2019 23:01)  T(F): 96.2 (21 Aug 2019 07:01), Max: 97.5 (20 Aug 2019 23:01)  HR: 49 (21 Aug 2019 07:01) (46 - 84)  BP: 94/51 (21 Aug 2019 07:01) (94/51 - 122/57)  BP(mean): 69 (21 Aug 2019 07:01) (69 - 79)  RR: 20 (21 Aug 2019 07:01) (18 - 46)  SpO2: 97% (21 Aug 2019 07:01) (89% - 99%)  CAPILLARY BLOOD GLUCOSE      POCT Blood Glucose.: 114 mg/dL (21 Aug 2019 05:52)  POCT Blood Glucose.: 134 mg/dL (21 Aug 2019 00:26)  POCT Blood Glucose.: 150 mg/dL (20 Aug 2019 18:12)  POCT Blood Glucose.: 168 mg/dL (20 Aug 2019 11:58)      LABS:                        15.7   19.63 )-----------( 160      ( 21 Aug 2019 06:07 )             56.0     08-21    141  |  99  |  52<H>  ----------------------------<  125<H>  5.0   |  33<H>  |  2.1<H>    Ca    8.6      21 Aug 2019 06:07  Mg     2.3     08-21          ABG - ( 20 Aug 2019 06:18 )  pH, Arterial: 7.29  pH, Blood: x     /  pCO2: 71    /  pO2: 59    / HCO3: 34    / Base Excess: 4.2   /  SaO2: 89                    Culture - Blood (collected 18 Aug 2019 23:30)  Source: .Blood Blood  Gram Stain (20 Aug 2019 02:55):    Growth in aerobic bottle: Gram Negative Rods  Preliminary Report (20 Aug 2019 19:56):    Growth in aerobic bottle: Pseudomonas stutzeri    See previous culture [id number]    Culture - Blood (collected 18 Aug 2019 23:30)  Source: .Blood Blood  Gram Stain (20 Aug 2019 02:26):    Growth in aerobic bottle: Gram Negative Rods  Preliminary Report (20 Aug 2019 19:53):    Growth in aerobic bottle: Pseudomonas stutzeri    "Due to technical problems, Proteus sp. will Not be reported as part of    the BCID panel until further notice"    ***Blood Panel PCR results on this specimen are available    approximately 3 hours after the Gram stain result.***    Gram stain, PCR, and/or culture results may not always    correspond due to difference in methodologies.    ************************************************************    This PCR assay was performed using [employer].    The following targets are tested for: Enterococcus,    vancomycin resistant enterococci, Listeria monocytogenes,    coagulase negative staphylococci, S. aureus,    methicillin resistant S. aureus, Streptococcus agalactiae    (Group B), S. pneumoniae, S. pyogenes (Group A),    Acinetobacter baumannii, Enterobacter cloacae, E. coli,    Klebsiella oxytoca, K. pneumoniae, Proteus sp.,    Serratia marcescens, Haemophilus influenzae,    Neisseria meningitidis, Pseudomonas aeruginosa, Candida    albicans, C. glabrata, C krusei, C parapsilosis,    C. tropicalis and the KPC resistance gene.  Organism: Blood Culture PCR (20 Aug 2019 04:32)  Organism: Blood Culture PCR (20 Aug 2019 04:32)      CARDIAC MARKERS ( 19 Aug 2019 11:45 )  x     / <0.01 ng/mL / x     / x     / x          RADIOLOGY:    PHYSICAL EXAM:  GEN: No acute distress  LUNGS: Clear to auscultation bilaterally   HEART: S1/S2 present. RRR.   ABD: Soft, non-tender, non-distended. Bowel sounds present  EXT: NC/NC/NE/2+PP/RAMAN  NEURO: AAOX3 SUBJECTIVE:    Patient is a 59y old Male who presents with a chief complaint of CHF exacerbation (21 Aug 2019 09:08)    Currently admitted to medicine with the primary diagnosis of Acute decompensated heart failure     Today is hospital day 2d. This morning he is resting comfortably in bed and reports no new issues or overnight events.     PAST MEDICAL & SURGICAL HISTORY  Chronic CHF  COPD, severe  Hypertension    SOCIAL HISTORY:  Negative for smoking/alcohol/drug use.     Home Medications:  Home Medications:  furosemide 40 mg oral tablet: 1 tab(s) orally once a day (19 Aug 2019 02:14)  lisinopril 40 mg oral tablet: 1 tab(s) orally once a day (19 Aug 2019 02:14)  metoprolol tartrate 25 mg oral tablet: 1 tab(s) orally 2 times a day (19 Aug 2019 02:14)  tamsulosin 0.4 mg oral capsule: 1 cap(s) orally once a day (19 Aug 2019 02:14)      ALLERGIES:  Allergy Status Unknown    MEDICATIONS:  STANDING MEDICATIONS  cefepime   IVPB 2000 milliGRAM(s) IV Intermittent every 12 hours  chlorhexidine 4% Liquid 1 Application(s) Topical two times a day  heparin  Injectable 5000 Unit(s) SubCutaneous every 8 hours  metoprolol tartrate 25 milliGRAM(s) Oral two times a day  pantoprazole    Tablet 40 milliGRAM(s) Oral before breakfast  predniSONE   Tablet 40 milliGRAM(s) Oral daily  silver sulfADIAZINE 1% Cream 1 Application(s) Topical two times a day  tamsulosin 0.4 milliGRAM(s) Oral at bedtime    PRN MEDICATIONS  ALBUTerol/ipratropium for Nebulization 3 milliLiter(s) Nebulizer every 6 hours PRN    VITALS:   Vital Signs Last 24 Hrs  T(C): 35.7 (21 Aug 2019 07:01), Max: 36.4 (20 Aug 2019 23:01)  T(F): 96.2 (21 Aug 2019 07:01), Max: 97.5 (20 Aug 2019 23:01)  HR: 49 (21 Aug 2019 07:01) (46 - 84)  BP: 94/51 (21 Aug 2019 07:01) (94/51 - 122/57)  BP(mean): 69 (21 Aug 2019 07:01) (69 - 79)  RR: 20 (21 Aug 2019 07:01) (18 - 46)  SpO2: 97% (21 Aug 2019 07:01) (89% - 99%)  CAPILLARY BLOOD GLUCOSE      POCT Blood Glucose.: 114 mg/dL (21 Aug 2019 05:52)  POCT Blood Glucose.: 134 mg/dL (21 Aug 2019 00:26)  POCT Blood Glucose.: 150 mg/dL (20 Aug 2019 18:12)  POCT Blood Glucose.: 168 mg/dL (20 Aug 2019 11:58)      LABS:                        15.7   19.63 )-----------( 160      ( 21 Aug 2019 06:07 )             56.0     08-21    141  |  99  |  52<H>  ----------------------------<  125<H>  5.0   |  33<H>  |  2.1<H>    Ca    8.6      21 Aug 2019 06:07  Mg     2.3     08-21          ABG - ( 20 Aug 2019 06:18 )  pH, Arterial: 7.29  pH, Blood: x     /  pCO2: 71    /  pO2: 59    / HCO3: 34    / Base Excess: 4.2   /  SaO2: 89                    Culture - Blood (collected 18 Aug 2019 23:30)  Source: .Blood Blood  Gram Stain (20 Aug 2019 02:55):    Growth in aerobic bottle: Gram Negative Rods  Preliminary Report (20 Aug 2019 19:56):    Growth in aerobic bottle: Pseudomonas stutzeri    See previous culture [id number]    Culture - Blood (collected 18 Aug 2019 23:30)  Source: .Blood Blood  Gram Stain (20 Aug 2019 02:26):    Growth in aerobic bottle: Gram Negative Rods  Preliminary Report (20 Aug 2019 19:53):    Growth in aerobic bottle: Pseudomonas stutzeri    "Due to technical problems, Proteus sp. will Not be reported as part of    the BCID panel until further notice"    ***Blood Panel PCR results on this specimen are available    approximately 3 hours after the Gram stain result.***    Gram stain, PCR, and/or culture results may not always    correspond due to difference in methodologies.    ************************************************************    This PCR assay was performed using [employer].    The following targets are tested for: Enterococcus,    vancomycin resistant enterococci, Listeria monocytogenes,    coagulase negative staphylococci, S. aureus,    methicillin resistant S. aureus, Streptococcus agalactiae    (Group B), S. pneumoniae, S. pyogenes (Group A),    Acinetobacter baumannii, Enterobacter cloacae, E. coli,    Klebsiella oxytoca, K. pneumoniae, Proteus sp.,    Serratia marcescens, Haemophilus influenzae,    Neisseria meningitidis, Pseudomonas aeruginosa, Candida    albicans, C. glabrata, C krusei, C parapsilosis,    C. tropicalis and the KPC resistance gene.  Organism: Blood Culture PCR (20 Aug 2019 04:32)  Organism: Blood Culture PCR (20 Aug 2019 04:32)      CARDIAC MARKERS ( 19 Aug 2019 11:45 )  x     / <0.01 ng/mL / x     / x     / x          RADIOLOGY:  < from: Xray Chest 1 View- PORTABLE-Routine (08.20.19 @ 05:55) >  Findings of congestive heart failure.    < end of copied text >    PHYSICAL EXAM:  GEN: No acute distress on NC  LUNGS: Rales appreciated b/l  HEART: S1/S2 present. RRR. distant heart sounds  ABD: Soft, non-tender, non-distended. Bowel sounds present  EXT: b/l LE ulcers  NEURO: AAOX3

## 2019-08-21 NOTE — PROGRESS NOTE ADULT - SUBJECTIVE AND OBJECTIVE BOX
Patient is a 59y old  Male who presents with a chief complaint of CHF exacerbation (21 Aug 2019 23:04)          REVIEW OF SYSTEMS:        ICU Vital Signs Last 24 Hrs  T(C): 36.1 (21 Aug 2019 23:00), Max: 36.4 (21 Aug 2019 15:00)  T(F): 97 (21 Aug 2019 23:00), Max: 97.5 (21 Aug 2019 15:00)  HR: 53 (21 Aug 2019 23:00) (47 - 70)  BP: 109/51 (21 Aug 2019 23:00) (90/54 - 126/63)  BP(mean): 74 (21 Aug 2019 23:00) (65 - 90)  ABP: --  ABP(mean): --  RR: 26 (21 Aug 2019 23:00) (20 - 324)  SpO2: 93% (21 Aug 2019 23:00) (89% - 97%)      PHYSICAL EXAM:  GENERAL: NAD, well-groomed, well-developed  HEAD:  Atraumatic, Normocephalic  EYES: EOMI, PERRLA, conjunctiva and sclera clear  ENMT: No tonsillar erythema, exudates, or enlargement; Moist mucous membranes, Good dentition, No lesions  NECK: Supple, No JVD, Normal thyroid  NERVOUS SYSTEM:  Alert & Oriented X3, Good concentration; Motor Strength 5/5 B/L upper and lower extremities; DTRs 2+ intact and symmetric  CHEST/LUNG: Clear to percussion bilaterally; No rales, rhonchi, wheezing, or rubs  HEART: Regular rate and rhythm; No murmurs, rubs, or gallops  ABDOMEN: Soft, Nontender, Nondistended; Bowel sounds present  EXTREMITIES:  2+ Peripheral Pulses, No clubbing, cyanosis, or edema  LYMPH: No lymphadenopathy noted  SKIN: No rashes or lesions      MEDICATIONS  (STANDING):  cefepime   IVPB 2000 milliGRAM(s) IV Intermittent every 12 hours  chlorhexidine 4% Liquid 1 Application(s) Topical two times a day  heparin  Injectable 5000 Unit(s) SubCutaneous every 8 hours  metoprolol tartrate 25 milliGRAM(s) Oral two times a day  pantoprazole    Tablet 40 milliGRAM(s) Oral before breakfast  predniSONE   Tablet 40 milliGRAM(s) Oral daily  silver sulfADIAZINE 1% Cream 1 Application(s) Topical two times a day  tamsulosin 0.4 milliGRAM(s) Oral at bedtime    MEDICATIONS  (PRN):  ALBUTerol/ipratropium for Nebulization 3 milliLiter(s) Nebulizer every 6 hours PRN Bronchospasm        Allergies    Allergy Status Unknown    Intolerances            LABS:                        15.7   19.63 )-----------( 160      ( 21 Aug 2019 06:07 )             56.0     08-21    141  |  99  |  52<H>  ----------------------------<  125<H>  5.0   |  33<H>  |  2.1<H>    Ca    8.6      21 Aug 2019 06:07  Mg     2.3     08-21          CAPILLARY BLOOD GLUCOSE  POCT Blood Glucose.: 133 mg/dL (21 Aug 2019 12:13)  POCT Blood Glucose.: 114 mg/dL (21 Aug 2019 05:52)  POCT Blood Glucose.: 134 mg/dL (21 Aug 2019 00:26)      RADIOLOGY & ADDITIONAL TESTS:    Imaging Personally Reviewed:  [ ] YES  [ ] NO    Consultant(s) Notes Reviewed:  [ ] YES  [ ] NO Patient is seen and examined at the bed side, is afebrile. The Leukocytosis is trending down. He is tolerating Cefepime well.         REVIEW OF SYSTEMS: All other review systems are negative        ICU Vital Signs Last 24 Hrs  T(C): 36.1 (21 Aug 2019 23:00), Max: 36.4 (21 Aug 2019 15:00)  T(F): 97 (21 Aug 2019 23:00), Max: 97.5 (21 Aug 2019 15:00)  HR: 53 (21 Aug 2019 23:00) (47 - 70)  BP: 109/51 (21 Aug 2019 23:00) (90/54 - 126/63)  BP(mean): 74 (21 Aug 2019 23:00) (65 - 90)  ABP: --  ABP(mean): --  RR: 26 (21 Aug 2019 23:00) (20 - 324)  SpO2: 93% (21 Aug 2019 23:00) (89% - 97%)        PHYSICAL EXAM:  GENERAL: Not in acute distress  CHEST/LUNG:  Air entry bilaterally  HEART: s1 and s2 present   ABDOMEN: Grossly obese  EXTREMITIES:  B/L  LE Bandage in placed  CNS: Awake and alert        MEDICATIONS  (STANDING):  cefepime   IVPB 2000 milliGRAM(s) IV Intermittent every 12 hours  chlorhexidine 4% Liquid 1 Application(s) Topical two times a day  heparin  Injectable 5000 Unit(s) SubCutaneous every 8 hours  metoprolol tartrate 25 milliGRAM(s) Oral two times a day  pantoprazole    Tablet 40 milliGRAM(s) Oral before breakfast  predniSONE   Tablet 40 milliGRAM(s) Oral daily  silver sulfADIAZINE 1% Cream 1 Application(s) Topical two times a day  tamsulosin 0.4 milliGRAM(s) Oral at bedtime    MEDICATIONS  (PRN):  ALBUTerol/ipratropium for Nebulization 3 milliLiter(s) Nebulizer every 6 hours PRN Bronchospasm        Allergies    Allergy Status Unknown        LABS:                        15.7   19.63 )-----------( 160      ( 21 Aug 2019 06:07 )             56.0         08-21    141  |  99  |  52<H>  ----------------------------<  125<H>  5.0   |  33<H>  |  2.1<H>    Ca    8.6      21 Aug 2019 06:07  Mg     2.3     08-21          CAPILLARY BLOOD GLUCOSE  POCT Blood Glucose.: 133 mg/dL (21 Aug 2019 12:13)  POCT Blood Glucose.: 114 mg/dL (21 Aug 2019 05:52)  POCT Blood Glucose.: 134 mg/dL (21 Aug 2019 00:26)        RADIOLOGY & ADDITIONAL TESTS:    < from: CT Chest No Cont (08.21.19 @ 12:06) >    1. Since July 7, 2017, no CT evidence of acute intrathoracic pathology.    2. Unchanged dilated main pulmonary artery, measuring 4.6 cm, suggestive   of underlying pulmonary hypertension.      < end of copied text >      < from: Xray Chest 1 View- PORTABLE-Routine (08.21.19 @ 07:04) >    No significant interval change.          MICROBIOLOGY DATA:      MRSA/MSSA PCR (08.20.19 @ 08:51)    MRSA PCR Result.: Negative: CATE Lyn  By: Real-Time PCR (Polymerase Reaction Method)        Culture - Blood (08.18.19 @ 23:30)    Gram Stain:   Growth in aerobic bottle: Gram Negative Rods    Specimen Source: .Blood Blood    Culture Results:   Growth in aerobic bottle: Pseudomonas stutzeri  See previous culture [id number]        Culture - Blood (08.18.19 @ 23:30)    -  Multidrug (KPC pos) resistant organism: Nondet    -  Staphylococcus aureus: Nondet    -  Methicillin resistant Staphylococcus aureus (MRSA): Nondet    -  Coagulase negative Staphylococcus: Nondet    -  Enterococcus species: Nondet    -  Vancomycin resistant Enterococcus sp.: Nondet    -  Escherichia coli: Nondet    -  Klebsiella oxytoca: Nondet    -  Klebsiella pneumoniae: Nondet    -  Serratia marcescens: Nondet    -  Haemophilus influenzae: Nondet    -  Listeria monocytogenes: Nondet    -  Neisseria meningitidis: Nondet    -  Pseudomonas aeruginosa: Nondet    -  Acinetobacter baumanii: Nondet    -  Enterobacter cloacae complex: Nondet    -  Streptococcus sp. (Not Grp A, B or S pneumoniae): Nondet    -  Streptococcus agalactiae (Group B): Nondet    -  Streptococcus pyogenes (Group A): Nondet    -  Streptococcus pneumoniae: Nondet    -  Candida albicans: Nondet    -  Candida glabrata: Nondet    -  Candida krusei: Nondet    -  Candida parapsilosis: Nondet    -  Candida tropicalis: Nondet    Gram Stain:   Growth in aerobic bottle: Gram Negative Rods    -  Amikacin: S <=16    -  Aztreonam: S <=4    -  Cefepime: S <=2    -  Ceftriaxone: S <=1    -  Ciprofloxacin: S <=1    -  Gentamicin: S <=2    -  Levofloxacin: S <=2    -  Meropenem: S <=1    -  Piperacillin/Tazobactam: S <=8    -  Tobramycin: S <=2    -  Trimethoprim/Sulfamethoxazole: S <=2/38    Specimen Source: .Blood Blood    Organism: Blood Culture PCR    Organism: Pseudomonas stutzeri    Culture Results:   Growth in aerobic bottle: Pseudomonas stutzeri  "Due to technical problems, Proteus sp. will Not be reported as part of  the BCID panel until further notice"  ***Blood Panel PCR results on this specimen are available  approximately 3 hours after the Gram stain result.***  Gram stain, PCR, and/or culture results may not always  correspond due to difference in methodologies.  ************************************************************  This PCR assay was performed using [employer].  The following targets are tested for: Enterococcus,  vancomycin resistant enterococci, Listeria monocytogenes,  coagulase negative staphylococci, S. aureus,  methicillin resistant S. aureus, Streptococcus agalactiae  (Group B), S. pneumoniae, S. pyogenes (Group A),  Acinetobacter baumannii, Enterobacter cloacae, E. coli,  Klebsiella oxytoca, K. pneumoniae, Proteus sp.,  Serratia marcescens, Haemophilus influenzae,  Neisseria meningitidis, Pseudomonas aeruginosa, Candida  albicans, C. glabrata, C krusei, C parapsilosis,  C. tropicalis and the KPC resistance gene.    Organism Identification: Blood Culture PCR  Pseudomonas stutzeri    Method Type: PCR    Method Type: DALTON

## 2019-08-21 NOTE — PROGRESS NOTE ADULT - SUBJECTIVE AND OBJECTIVE BOX
Patient is a 59y old  Male who presents with a chief complaint of CHF exacerbation (21 Aug 2019 06:42)      OVER NIGHT EVENTS:  No overnight events; Tolerating NIV overnight;     PHYSICAL EXAM    ICU Vital Signs Last 24 Hrs  T(C): 35.7 (21 Aug 2019 07:01), Max: 36.4 (20 Aug 2019 23:01)  T(F): 96.2 (21 Aug 2019 07:01), Max: 97.5 (20 Aug 2019 23:01)  HR: 49 (21 Aug 2019 07:01) (46 - 84)  BP: 94/51 (21 Aug 2019 07:01) (94/51 - 122/57)  BP(mean): 69 (21 Aug 2019 07:01) (69 - 79)  RR: 20 (21 Aug 2019 07:01) (18 - 46)  SpO2: 97% (21 Aug 2019 07:01) (89% - 99%)    I&O's Detail    20 Aug 2019 07:01  -  21 Aug 2019 07:00  --------------------------------------------------------  IN:    Oral Fluid: 540 mL    Solution: 100 mL  Total IN: 640 mL    OUT:    Indwelling Catheter - Urethral: 1220 mL  Total OUT: 1220 mL    Total NET: -580 mL    General: Comfortable in bed  HEENT:  On NC  Lymph node: No palpable LN             Lungs: RONAK over night   Cardiovascular: RRR, S1S2  Abdomen: BS+ve; soft non tender  Extremities: LE edema   Skin:  Chronic venous stasis changes   Neurological:  AAOx3; No focal deficit      LABS:                        15.7   19.63 )-----------( 160      ( 21 Aug 2019 06:07 )             56.0   08-21    141  |  99  |  52<H>  ----------------------------<  125<H>  5.0   |  33<H>  |  2.1<H> (1.7-1.3)     Ca    8.6      21 Aug 2019 06:07  Mg     2.3     08-21    CARDIAC MARKERS ( 19 Aug 2019 11:45 )  x     / <0.01 ng/mL / x     / x     / x                                                                             Lactate (08-19-19 @ 05:30): 1.3  Lactate (08-18-19 @ 23:30): 1.7  Lactate (08-18-19 @ 23:10): 1.6    Culture - Blood (collected 18 Aug 2019 23:30)  Source: .Blood Blood  Gram Stain (20 Aug 2019 02:55):    Growth in aerobic bottle: Gram Negative Rods  Preliminary Report (20 Aug 2019 19:56):    Growth in aerobic bottle: Pseudomonas stutzeri    See previous culture [id number]    Culture - Blood (collected 18 Aug 2019 23:30)  Source: .Blood Blood  Gram Stain (20 Aug 2019 02:26):    Growth in aerobic bottle: Gram Negative Rods  Preliminary Report (20 Aug 2019 19:53):    Growth in aerobic bottle: Pseudomonas stutzeri    "Due to technical problems, Proteus sp. will Not be reported as part of    the BCID panel until further notice"    ***Blood Panel PCR results on this specimen are available    approximately 3 hours after the Gram stain result.***    Gram stain, PCR, and/or culture results may not always    correspond due to difference in methodologies.    ************************************************************    This PCR assay was performed using [employer].    The following targets are tested for: Enterococcus,    vancomycin resistant enterococci, Listeria monocytogenes,    coagulase negative staphylococci, S. aureus,    methicillin resistant S. aureus, Streptococcus agalactiae    (Group B), S. pneumoniae, S. pyogenes (Group A),    Acinetobacter baumannii, Enterobacter cloacae, E. coli,    Klebsiella oxytoca, K. pneumoniae, Proteus sp.,    Serratia marcescens, Haemophilus influenzae,    Neisseria meningitidis, Pseudomonas aeruginosa, Candida    albicans, C. glabrata, C krusei, C parapsilosis,    C. tropicalis and the KPC resistance gene.  Organism: Blood Culture PCR (20 Aug 2019 04:32)  Organism: Blood Culture PCR (20 Aug 2019 04:32)    ABG - ( 20 Aug 2019 06:18 )  pH, Arterial: 7.29  pH, Blood: x     /  pCO2: 71    /  pO2: 59    / HCO3: 34    / Base Excess: 4.2   /  SaO2: 89        MEDICATIONS  (STANDING):  cefepime   IVPB 2000 milliGRAM(s) IV Intermittent every 12 hours  chlorhexidine 4% Liquid 1 Application(s) Topical two times a day  furosemide   Injectable 40 milliGRAM(s) IV Push two times a day  heparin  Injectable 5000 Unit(s) SubCutaneous every 8 hours  metoprolol tartrate 25 milliGRAM(s) Oral two times a day  pantoprazole    Tablet 40 milliGRAM(s) Oral before breakfast  predniSONE   Tablet 40 milliGRAM(s) Oral daily  silver sulfADIAZINE 1% Cream 1 Application(s) Topical two times a day  tamsulosin 0.4 milliGRAM(s) Oral at bedtime    MEDICATIONS  (PRN):  ALBUTerol/ipratropium for Nebulization 3 milliLiter(s) Nebulizer every 6 hours PRN Bronchospasm    Radiology:  Cxr: Poor technique;

## 2019-08-21 NOTE — PROGRESS NOTE ADULT - ASSESSMENT
·  Problem: Sepsis, due to unspecified organism.  Plan: acute illness  GNR in blood   ? urine - neg UA but dirty looking urine   ? lungs - cough and SOB but COPD / CHF   ?? skin - far less common - usually strep       Dc Vanco - doubt MRSA of LE   Taper abx with Cx results   at least 10 days of abx - ? IV to PO. # Septic shock  ( Fever + Hypotension BP of 73/49 + Tachypnea )  # Bacteremia _ Pseudomonas  # LE venous statis with ? superimposed  bacterial infection    would recommend:    1. Obtain Repeat Blood cultures  X 2 in AM to document clearing the blood stream  2. Continue Cefepime since its sensitive to Pseudomonas  3. Monitor kidney function and adjust Cefepime doses accordingly  4. Monitor WBC count  5. Keep Both LE elevated    d/w CCU  team

## 2019-08-21 NOTE — PROGRESS NOTE ADULT - ASSESSMENT
60yo Male w/ PMH of HTN, COPD (on Home O2 as needed), CHF BIBA c/o SOB     #) Acute Hypoxic resp failure 2/2 Acute decompensated HF  - Upgrade to MICU  - CXR shows congestive HF signs, BNP 3162  - Echo 08/2019 was suboptimal study shows Normal EF with LV Moderate concentric hypertrophy  - Duplex b/l LE neg  - Switch Lasix 40mg daily IV  - Monitor I & O, Cr, and daily weight  - Keep Avelar in place  - F/U CT chest w/o contrast    #) Acute hypercapnic respiratory failure secondary to COPD exacerbation with chronic CO2 retention 2/2 OHS and COPD  - Respiratory acidosis on presentation  - Improved on AVAPS, cont 4 on 4 off and QHS  - ABG in am  - Keep O2 88-92%  - Prednisone 40mg daily 3/5 days  - Neb q4hr and PRN    #) Sepsis secondary to Pseudomonas stutzeri bacteremia and LE cellulitis  - T max 102 with leukocytosis and normal lactate on presentatio  - Afebrile 48hrs  - UA neg  - Blood culture shows Pseudomonas stutzeri. Pending sensitivity  - Cont Cefepime 2000mg Q12 and d/c Vancomycin  - F/U repeat Blood culture  - F/U ID recommendations  - Burn Consult    #) OLIVE on CKD  - Cr today 2.1 (baseline 1.3)  - Possible Cardiorenal vs Diuretic induced vs Hypotension induced vs Vancomycin induced  - Hold lisinopril  - FeUrea 13% likely prerenal  #) Hyperkalemia resolved  - Likely Secondary ro OLIVE  - Hold Lisinopril  - Insulin and Dextrose  - F/U repeat BMP    DVT ppx Heparin Sq  DASH diet with Fluid restriction Cornell Sung)  Cardiology; Internal Medicine  [address] [place] [address]  Phone: [phone]  Fax: [phone]  Follow Up Time:

## 2019-08-22 DIAGNOSIS — A41.52 SEPSIS DUE TO PSEUDOMONAS: ICD-10-CM

## 2019-08-22 LAB
ANION GAP SERPL CALC-SCNC: 10 MMOL/L — SIGNIFICANT CHANGE UP (ref 7–14)
BASOPHILS # BLD AUTO: 0 K/UL — SIGNIFICANT CHANGE UP (ref 0–0.2)
BASOPHILS NFR BLD AUTO: 0 % — SIGNIFICANT CHANGE UP (ref 0–1)
BUN SERPL-MCNC: 66 MG/DL — CRITICAL HIGH (ref 10–20)
CALCIUM SERPL-MCNC: 8.9 MG/DL — SIGNIFICANT CHANGE UP (ref 8.5–10.1)
CHLORIDE SERPL-SCNC: 100 MMOL/L — SIGNIFICANT CHANGE UP (ref 98–110)
CO2 SERPL-SCNC: 31 MMOL/L — SIGNIFICANT CHANGE UP (ref 17–32)
CREAT SERPL-MCNC: 2.1 MG/DL — HIGH (ref 0.7–1.5)
EOSINOPHIL # BLD AUTO: 0.01 K/UL — SIGNIFICANT CHANGE UP (ref 0–0.7)
EOSINOPHIL NFR BLD AUTO: 0.1 % — SIGNIFICANT CHANGE UP (ref 0–8)
GLUCOSE SERPL-MCNC: 105 MG/DL — HIGH (ref 70–99)
HCT VFR BLD CALC: 57.2 % — HIGH (ref 42–52)
HGB BLD-MCNC: 16.1 G/DL — SIGNIFICANT CHANGE UP (ref 14–18)
IMM GRANULOCYTES NFR BLD AUTO: 0.7 % — HIGH (ref 0.1–0.3)
LYMPHOCYTES # BLD AUTO: 0.42 K/UL — LOW (ref 1.2–3.4)
LYMPHOCYTES # BLD AUTO: 2.8 % — LOW (ref 20.5–51.1)
MAGNESIUM SERPL-MCNC: 2.3 MG/DL — SIGNIFICANT CHANGE UP (ref 1.8–2.4)
MCHC RBC-ENTMCNC: 25.8 PG — LOW (ref 27–31)
MCHC RBC-ENTMCNC: 28.1 G/DL — LOW (ref 32–37)
MCV RBC AUTO: 91.5 FL — SIGNIFICANT CHANGE UP (ref 80–94)
MONOCYTES # BLD AUTO: 0.85 K/UL — HIGH (ref 0.1–0.6)
MONOCYTES NFR BLD AUTO: 5.7 % — SIGNIFICANT CHANGE UP (ref 1.7–9.3)
NEUTROPHILS # BLD AUTO: 13.52 K/UL — HIGH (ref 1.4–6.5)
NEUTROPHILS NFR BLD AUTO: 90.7 % — HIGH (ref 42.2–75.2)
NRBC # BLD: 0 /100 WBCS — SIGNIFICANT CHANGE UP (ref 0–0)
PLATELET # BLD AUTO: 127 K/UL — LOW (ref 130–400)
POTASSIUM SERPL-MCNC: 5.2 MMOL/L — HIGH (ref 3.5–5)
POTASSIUM SERPL-SCNC: 5.2 MMOL/L — HIGH (ref 3.5–5)
RBC # BLD: 6.25 M/UL — HIGH (ref 4.7–6.1)
RBC # FLD: 17.8 % — HIGH (ref 11.5–14.5)
SODIUM SERPL-SCNC: 141 MMOL/L — SIGNIFICANT CHANGE UP (ref 135–146)
WBC # BLD: 14.9 K/UL — HIGH (ref 4.8–10.8)
WBC # FLD AUTO: 14.9 K/UL — HIGH (ref 4.8–10.8)

## 2019-08-22 PROCEDURE — 71045 X-RAY EXAM CHEST 1 VIEW: CPT | Mod: 26

## 2019-08-22 RX ORDER — FUROSEMIDE 40 MG
60 TABLET ORAL DAILY
Refills: 0 | Status: DISCONTINUED | OUTPATIENT
Start: 2019-08-22 | End: 2019-08-30

## 2019-08-22 RX ORDER — SODIUM HYPOCHLORITE 0.125 %
1 SOLUTION, NON-ORAL MISCELLANEOUS
Refills: 0 | Status: DISCONTINUED | OUTPATIENT
Start: 2019-08-22 | End: 2019-08-22

## 2019-08-22 RX ORDER — COLLAGENASE CLOSTRIDIUM HIST. 250 UNIT/G
1 OINTMENT (GRAM) TOPICAL
Refills: 0 | Status: DISCONTINUED | OUTPATIENT
Start: 2019-08-22 | End: 2019-08-30

## 2019-08-22 RX ORDER — SODIUM HYPOCHLORITE 0.125 %
1 SOLUTION, NON-ORAL MISCELLANEOUS
Refills: 0 | Status: DISCONTINUED | OUTPATIENT
Start: 2019-08-22 | End: 2019-08-30

## 2019-08-22 RX ADMIN — Medication 1 APPLICATION(S): at 17:24

## 2019-08-22 RX ADMIN — Medication 40 MILLIGRAM(S): at 05:07

## 2019-08-22 RX ADMIN — TAMSULOSIN HYDROCHLORIDE 0.4 MILLIGRAM(S): 0.4 CAPSULE ORAL at 21:28

## 2019-08-22 RX ADMIN — CEFEPIME 100 MILLIGRAM(S): 1 INJECTION, POWDER, FOR SOLUTION INTRAMUSCULAR; INTRAVENOUS at 17:23

## 2019-08-22 RX ADMIN — CEFEPIME 100 MILLIGRAM(S): 1 INJECTION, POWDER, FOR SOLUTION INTRAMUSCULAR; INTRAVENOUS at 05:11

## 2019-08-22 RX ADMIN — Medication 60 MILLIGRAM(S): at 12:28

## 2019-08-22 RX ADMIN — HEPARIN SODIUM 5000 UNIT(S): 5000 INJECTION INTRAVENOUS; SUBCUTANEOUS at 05:07

## 2019-08-22 RX ADMIN — PANTOPRAZOLE SODIUM 40 MILLIGRAM(S): 20 TABLET, DELAYED RELEASE ORAL at 05:07

## 2019-08-22 RX ADMIN — CHLORHEXIDINE GLUCONATE 1 APPLICATION(S): 213 SOLUTION TOPICAL at 17:24

## 2019-08-22 RX ADMIN — Medication 1 APPLICATION(S): at 05:12

## 2019-08-22 RX ADMIN — Medication 25 MILLIGRAM(S): at 05:07

## 2019-08-22 RX ADMIN — CHLORHEXIDINE GLUCONATE 1 APPLICATION(S): 213 SOLUTION TOPICAL at 12:29

## 2019-08-22 RX ADMIN — Medication 1 APPLICATION(S): at 17:23

## 2019-08-22 NOTE — PROGRESS NOTE ADULT - ASSESSMENT
Appreciate ID. RX priscilla. Out greater than in . Check lytes  , Echo mod lvh EF 55%. D/C broussard if stable . May need to hold lasix, Prognosis guarded. Patient aware

## 2019-08-22 NOTE — CHART NOTE - NSCHARTNOTEFT_GEN_A_CORE
Upon Nutritional Assessment by the Registered Dietitian your patient was determined to meet criteria / has evidence of the following diagnosis/diagnoses:          [x] Morbid Obesity / BMI > 40      Findings as based on:  •  Anthropometric data    Ht: 183.1 cm.  Wt: 209.5 kg.  BMI: 62.5      PROVIDER Section:     By signing this assessment you are acknowledging and agree with the diagnosis/diagnoses assigned by the Registered Dietitian    Comments:

## 2019-08-22 NOTE — PROGRESS NOTE ADULT - ASSESSMENT
58yo Male w/ PMH of HTN, COPD (on Home O2 as needed), CHF BIBA c/o SOB     #) Acute Hypoxic resp failure 2/2 Acute decompensated HF  - Upgrade to MICU  - CXR shows congestive HF signs, BNP 3162  - Echo 08/2019 was suboptimal study shows Normal EF with LV Moderate concentric hypertrophy  - Duplex b/l LE neg  - lasix to 60 mg po    - Monitor I & O, Cr, and daily weight  - Keep Avelar in place  - F/U CT chest w/o contrast    #) Acute hypercapnic respiratory failure secondary to COPD exacerbation with chronic CO2 retention 2/2 OHS and COPD  - Respiratory acidosis on presentation  - Improved on AVAPS, cont 4 on 4 off and QHS  - ABG in am  - Keep O2 88-92%  - Prednisone 40mg daily 3/5 days  - Neb q4hr and PRN    #) Sepsis secondary to Pseudomonas stutzeri bacteremia and LE cellulitis  - T max 102 with leukocytosis and normal lactate on presentatio  - Afebrile 48hrs  - UA neg  - Blood culture shows Pseudomonas stutzeri. Pending sensitivity  - Cont Cefepime 2000mg Q12 and d/c Vancomycin  - F/U repeat Blood culture  - F/U ID recommendations    #) OLIVE on CKD  - Cr today 2.1 (baseline 1.3)  - Possible Cardiorenal vs Diuretic induced vs Hypotension induced vs Vancomycin induced  - Hold lisinopril  - FeUrea 13% likely prerenal  #) Hyperkalemia resolved  - Likely Secondary ro OLIVE  - Hold Lisinopril  - Insulin and Dextrose  - F/U repeat BMP    DVT ppx Heparin Sq  DASH diet with Fluid restriction

## 2019-08-22 NOTE — PROGRESS NOTE ADULT - SUBJECTIVE AND OBJECTIVE BOX
60yo Male w/ PMH of HTN, COPD (on Home O2 as needed), CHF BIBA c/o SOB that started day prior to admission . Associated w/ cough w/ clear sputum. Pt reports chronically having SOB and cough due to COPD, but reports new onset of more severe SOB  Pt also reports having fever/chills. Pt thinks he has an infection from his legs. + severe chronic venous stasis and ulcers b/l. Pt sees vascular surgeon weekly for his condition. In ED, pt was in acute distress due to dyspnea. BNP 3100s noted. CXR shows vascular congestion b/l. IV lasix 80mg given and BIPAP applied w/ great relief. Pt was very hypertensive w/ SBP > 200, nitro drip started. Pt was febrile w/ Tmax of 103.4. Pt denies any CP, palpitation, abd pain, N/V/D, dysuria, calf pain, or dizziness/lightheadedness. now in CCU - sepesis gram -     interval - noted seen by renal; reviewed chart ; repeat cultures pending     PAST MEDICAL & SURGICAL HISTORY:  Chronic CHF  COPD, severe  Hypertension    MEDICATIONS  (STANDING):  cefepime   IVPB 2000 milliGRAM(s) IV Intermittent every 12 hours  chlorhexidine 4% Liquid 1 Application(s) Topical two times a day  collagenase Ointment 1 Application(s) Topical two times a day  Dakins Solution - 1/2 Strength 1 Application(s) Topical two times a day  furosemide    Tablet 60 milliGRAM(s) Oral daily  heparin  Injectable 5000 Unit(s) SubCutaneous every 8 hours  pantoprazole    Tablet 40 milliGRAM(s) Oral before breakfast  predniSONE   Tablet 40 milliGRAM(s) Oral daily  silver sulfADIAZINE 1% Cream 1 Application(s) Topical two times a day  tamsulosin 0.4 milliGRAM(s) Oral at bedtime    MEDICATIONS  (PRN):  ALBUTerol/ipratropium for Nebulization 3 milliLiter(s) Nebulizer every 6 hours PRN Bronchospasm    ICU Vital Signs Last 24 Hrs  T(C): 36 (22 Aug 2019 15:06), Max: 36.1 (21 Aug 2019 23:00)  T(F): 96.8 (22 Aug 2019 15:06), Max: 97 (21 Aug 2019 23:00)  HR: 71 (22 Aug 2019 20:00) (51 - 71)  BP: 134/54 (22 Aug 2019 20:00) (92/44 - 134/54)  BP(mean): 78 (22 Aug 2019 20:00) (64 - 86)  ABP: --  ABP(mean): --  RR: 21 (22 Aug 2019 20:00) (18 - 35)  SpO2: 96% (22 Aug 2019 20:00) (85% - 98%)    GENERAL :  up and alert sitting in chair   HEENT: PERRLA EOMI  LUNGS: decr bs   CARD: s1 s2 rrr  abd: obese   ext : wrapped   neuro - Alert Awake Ox 3                           16.1   14.90 )-----------( 127      ( 22 Aug 2019 06:12 )             57.2     08-22    141  |  100  |  66<HH>  ----------------------------<  105<H>  5.2<H>   |  31  |  2.1<H>    Ca    8.9      22 Aug 2019 06:12  Mg     2.3     08-22      Culture - Blood (08.18.19 @ 23:30)    Gram Stain:   Growth in aerobic bottle: Gram Negative Rods    Specimen Source: .Blood Blood    Culture Results:   Growth in aerobic bottle: Pseudomonas stutzeri  See previous culture [id number]

## 2019-08-22 NOTE — PROGRESS NOTE ADULT - ASSESSMENT
Non-oliguric OLIVE due to massive drop in BP, from ~ 230 down to 90's sytolic in a matter of a few hours, causing a severe pre-renal state.  Thankfully pt remains non-oliguric w/ stabilized BP, and therefore unlikely to have sustained an ischemic injury.    Volume overload which is subjectively and objectively improving    Recommendations:    1)  As long as negative balance persists, can continue PO Lasix.    2)  Daily BMP to monitor renal function and electrolytes    3)  Daily weights

## 2019-08-22 NOTE — PROGRESS NOTE ADULT - ASSESSMENT
58yo Male w/ PMH of HTN, COPD (on Home O2 as needed), CHF BIBA c/o SOB     #) Acute Hypoxic resp failure 2/2 Acute on chronic diastolic heart failure  - Downgrade to telemetry  - Echo 08/2019 was suboptimal study shows Normal EF with LV Moderate concentric hypertrophy  - Duplex b/l LE neg  - Lasix PO 60mg daily  - Monitor I & O, Cr, and daily weight  - d/c aarti    #) Acute hypercapnic respiratory failure secondary to COPD exacerbation with chronic CO2 retention 2/2 OHS and COPD  - Respiratory acidosis on presentation  - Improved on AVAPS, cont 4 on 4 off and QHS  - Keep O2 88-92%  - Prednisone 40mg daily 4/5 days  - Neb q4hr and PRN  - Will need AVAPS on discharge    #) Sepsis secondary to Pseudomonas stutzeri bacteremia and LE cellulitis  - T max 102 with leukocytosis and normal lactate on presentation  - Afebrile now  - UA neg  - Blood culture shows Pseudomonas stutzeri. Pan sensitive  - Cont Cefepime 2000mg Q12 for one more day then start Cipro 750mg q12 (4/10 days)  - F/U repeat Blood culture  - F/U ID recommendations  - Burn Consult pending    #) OLIVE on CKD  - Cr today 2.1 (baseline 1.3)  - Possible Cardiorenal vs Diuretic induced vs Hypotension induced vs Vancomycin induced  - Hold lisinopril  - FeUrea 13% likely prerenal  - Nephrology consulted and recommended to decrease lasix    #) Bradycardia medication induced  - Hold metoprolol at this time    #) Hyperkalemia resolved  - Likely Secondary ro OLIVE  - Hold Lisinopril  - Insulin and Dextrose  - F/U repeat BMP    DVT ppx Heparin Sq  DASH diet with Fluid restriction  PT/Rehab pending

## 2019-08-22 NOTE — PROGRESS NOTE ADULT - SUBJECTIVE AND OBJECTIVE BOX
SUBJECTIVE:    Patient is a 59y old Male who presents with a chief complaint of CHF exacerbation (22 Aug 2019 08:53)    Currently admitted to medicine with the primary diagnosis of Acute decompensated heart failure     Today is hospital day 3d. This morning he is resting comfortably in bed and reports no new issues or overnight events.     PAST MEDICAL & SURGICAL HISTORY  Chronic CHF  COPD, severe  Hypertension    SOCIAL HISTORY:  Negative for smoking/alcohol/drug use.     Home Medications:  Home Medications:  furosemide 40 mg oral tablet: 1 tab(s) orally once a day (19 Aug 2019 02:14)  lisinopril 40 mg oral tablet: 1 tab(s) orally once a day (19 Aug 2019 02:14)  metoprolol tartrate 25 mg oral tablet: 1 tab(s) orally 2 times a day (19 Aug 2019 02:14)  tamsulosin 0.4 mg oral capsule: 1 cap(s) orally once a day (19 Aug 2019 02:14)      ALLERGIES:  Allergy Status Unknown    MEDICATIONS:  STANDING MEDICATIONS  cefepime   IVPB 2000 milliGRAM(s) IV Intermittent every 12 hours  chlorhexidine 4% Liquid 1 Application(s) Topical two times a day  furosemide    Tablet 60 milliGRAM(s) Oral daily  heparin  Injectable 5000 Unit(s) SubCutaneous every 8 hours  pantoprazole    Tablet 40 milliGRAM(s) Oral before breakfast  predniSONE   Tablet 40 milliGRAM(s) Oral daily  silver sulfADIAZINE 1% Cream 1 Application(s) Topical two times a day  tamsulosin 0.4 milliGRAM(s) Oral at bedtime    PRN MEDICATIONS  ALBUTerol/ipratropium for Nebulization 3 milliLiter(s) Nebulizer every 6 hours PRN    VITALS:   Vital Signs Last 24 Hrs  T(C): 35.6 (22 Aug 2019 07:01), Max: 36.4 (21 Aug 2019 15:00)  T(F): 96 (22 Aug 2019 07:01), Max: 97.5 (21 Aug 2019 15:00)  HR: 51 (22 Aug 2019 08:54) (51 - 70)  BP: 99/57 (22 Aug 2019 08:54) (90/54 - 126/63)  BP(mean): 69 (22 Aug 2019 08:54) (64 - 90)  RR: 18 (22 Aug 2019 08:54) (18 - 324)  SpO2: 95% (22 Aug 2019 08:54) (85% - 95%)  CAPILLARY BLOOD GLUCOSE      POCT Blood Glucose.: 133 mg/dL (21 Aug 2019 12:13)      LABS:                        16.1   14.90 )-----------( 127      ( 22 Aug 2019 06:12 )             57.2     08-22    141  |  100  |  66<HH>  ----------------------------<  105<H>  5.2<H>   |  31  |  2.1<H>    Ca    8.9      22 Aug 2019 06:12  Mg     2.3     08-22        RADIOLOGY:  < from: CT Chest No Cont (08.21.19 @ 12:06) >  1. Since July 7, 2017, no CT evidence of acute intrathoracic pathology.    2. Unchanged dilated main pulmonary artery, measuring 4.6 cm, suggestive   of underlying pulmonary hypertension.    < end of copied text >    PHYSICAL EXAM:  GEN: No acute distress on NC  LUNGS: Rales appreciated b/l  HEART: S1/S2 present. RRR. distant heart sounds  ABD: Soft, non-tender, non-distended. Bowel sounds present  EXT: b/l LE ulcers  NEURO: AAOX3

## 2019-08-22 NOTE — PROGRESS NOTE ADULT - ASSESSMENT
IMPRESSION:  ARF on CRF hypercapnia   OLIVE   HFpEF   Pulmonary hypertension likely WHO II/III  Pseudomonas bacteremia   Cellulitis lower ext   HO AZIZA/OHS with non compliance to CPAP     PLAN:    CNS: no sedation     HEENT: oral care    PULMONARY: AVAPs as needed and at night; nebulizer Q 4 hrs and prn; prednisone 40 mg daily for 5 days total    CARDIOVASCULAR:  Lasix PO 60;  Keep I=<O; Target MAP > 65; Cardio FU    GI: GI prophylaxis. Feeding as tolerated     RENAL: FU lytes;     INFECTIOUS DISEASE: Antibiotics per ID;     HEMATOLOGICAL:  DVT prophylaxis.    ENDOCRINE:  Follow up FS.  Insulin protocol if needed.    MUSCULOSKELETAL: Wound care of LE; Burn eval    Downgrade to tele   PT rehab  D/C Rosio

## 2019-08-22 NOTE — PROGRESS NOTE ADULT - SUBJECTIVE AND OBJECTIVE BOX
CATE GALVAN  59y, Male  Allergy: Allergy Status Unknown      CHIEF COMPLAINT: CHF exacerbation (21 Aug 2019 23:40)      INTERVAL EVENTS/HPI  - No acute events overnight  - T(F): , Max: 97.5 (08-21-19 @ 15:00)  - Denies any worsening symptoms  - Tolerating medication    ROS  10 system review- neg     SOCIAL HISTORY    Substance Use (X  ) never used  (  ) IVDU (  ) Other:  Tobacco Usage:  (X   ) never smoked   (   ) former smoker   (   ) current smoker   Alcohol Usage: (   ) social  (   ) daily use (X   ) denies  Sexual History: not relevant       FH noncontributory     VITALS:  T(F): 96, Max: 97.5 (08-21-19 @ 15:00)  HR: 55  BP: 121/56  RR: 25Vital Signs Last 24 Hrs  T(C): 35.6 (22 Aug 2019 03:00), Max: 36.4 (21 Aug 2019 15:00)  T(F): 96 (22 Aug 2019 03:00), Max: 97.5 (21 Aug 2019 15:00)  HR: 55 (22 Aug 2019 05:00) (49 - 70)  BP: 121/56 (22 Aug 2019 05:00) (90/54 - 126/63)  BP(mean): 79 (22 Aug 2019 05:00) (65 - 90)  RR: 25 (22 Aug 2019 05:00) (19 - 324)  SpO2: 93% (22 Aug 2019 05:00) (85% - 97%)    PHYSICAL EXAM:  Gen: NAD, resting in bed  HEENT: Normocephalic, atraumatic  Neck: supple, no lymphadenopathy  CV: s1 s 2+   Lungs: decreased BS  Abdomen: Soft, BS present. obese  Ext: Warm, well perfused. LE stasis ulcers- dressed  Neuro: non focal, awake  Skin: no rash      TESTS & MEASUREMENTS:                        15.7   19.63 )-----------( 160      ( 21 Aug 2019 06:07 )             56.0     08-21    141  |  99  |  52<H>  ----------------------------<  125<H>  5.0   |  33<H>  |  2.1<H>    Ca    8.6      21 Aug 2019 06:07  Mg     2.3     08-21      eGFR if Non African American: 33 mL/min/1.73M2 (08-21-19 @ 06:07)  eGFR if : 38 mL/min/1.73M2 (08-21-19 @ 06:07)          Culture - Blood (collected 08-18-19 @ 23:30)  Source: .Blood Blood  Gram Stain (08-20-19 @ 02:55):    Growth in aerobic bottle: Gram Negative Rods  Final Report (08-21-19 @ 16:02):    Growth in aerobic bottle: Pseudomonas stutzeri    See previous culture [id number]    Culture - Blood (collected 08-18-19 @ 23:30)  Source: .Blood Blood  Gram Stain (08-20-19 @ 02:26):    Growth in aerobic bottle: Gram Negative Rods  Final Report (08-21-19 @ 16:01):    Growth in aerobic bottle: Pseudomonas stutzeri    "Due to technical problems, Proteus sp. will Not be reported as part of    the BCID panel until further notice"    ***Blood Panel PCR results on this specimen are available    approximately 3 hours after the Gram stain result.***    Gram stain, PCR, and/or culture results may not always    correspond due to difference in methodologies.    ************************************************************    This PCR assay was performed using [employer].    The following targets are tested for: Enterococcus,    vancomycin resistant enterococci, Listeria monocytogenes,    coagulase negative staphylococci, S. aureus,    methicillin resistant S. aureus, Streptococcus agalactiae    (Group B), S. pneumoniae, S. pyogenes (Group A),    Acinetobacter baumannii, Enterobacter cloacae, E. coli,    Klebsiella oxytoca, K. pneumoniae, Proteus sp.,    Serratia marcescens, Haemophilus influenzae,    Neisseria meningitidis, Pseudomonas aeruginosa, Candida    albicans, C. glabrata, C krusei, C parapsilosis,    C. tropicalis and the KPC resistance gene.  Organism: Blood Culture PCR  Pseudomonas stutzeri (08-21-19 @ 16:01)  Organism: Pseudomonas stutzeri (08-21-19 @ 16:01)      -  Amikacin: S <=16      -  Aztreonam: S <=4      -  Cefepime: S <=2      -  Ceftriaxone: S <=1      -  Ciprofloxacin: S <=1      -  Gentamicin: S <=2      -  Levofloxacin: S <=2      -  Meropenem: S <=1      -  Piperacillin/Tazobactam: S <=8      -  Tobramycin: S <=2      -  Trimethoprim/Sulfamethoxazole: S <=2/38      Method Type: DALTON  Organism: Blood Culture PCR (08-21-19 @ 16:01)      -  Acinetobacter baumanii: Nondet      -  Candida albicans: Nondet      -  Candida glabrata: Nondet      -  Candida krusei: Nondet      -  Candida parapsilosis: Nondet      -  Candida tropicalis: Nondet      -  Coagulase negative Staphylococcus: Nondet      -  Enterobacter cloacae complex: Nondet      -  Enterococcus species: Nondet      -  Escherichia coli: Nondet      -  Haemophilus influenzae: Nondet      -  Klebsiella oxytoca: Nondet      -  Klebsiella pneumoniae: Nondet      -  Listeria monocytogenes: Nondet      -  Methicillin resistant Staphylococcus aureus (MRSA): Nondet      -  Multidrug (KPC pos) resistant organism: Nondet      -  Neisseria meningitidis: Nondet      -  Pseudomonas aeruginosa: Nondet      -  Serratia marcescens: Nondet      -  Staphylococcus aureus: Nondet      -  Streptococcus agalactiae (Group B): Nondet      -  Streptococcus pneumoniae: Nondet      -  Streptococcus pyogenes (Group A): Nondet      -  Streptococcus sp. (Not Grp A, B or S pneumoniae): Nondet      -  Vancomycin resistant Enterococcus sp.: Nondet      Method Type: PCR        Lactate, Blood: 1.3 mmol/L (08-19-19 @ 05:30)  Lactate, Blood: 1.7 mmol/L (08-18-19 @ 23:30)  Blood Gas Venous - Lactate: 1.6 mmoL/L (08-18-19 @ 23:10)      INFECTIOUS DISEASES TESTING  MRSA PCR Result.: Negative (08-20-19 @ 08:51)  Hepatitis C Virus Interpretation: Nonreact (08-19-19 @ 05:30)      RADIOLOGY & ADDITIONAL TESTS:      CT  CT Chest No Cont:   EXAM:  CT CHEST            PROCEDURE DATE:  08/21/2019            INTERPRETATION:  Reason for Exam:  Shortness of breath.    Technique: CT of the chest was performed from the thoracic inlet to the   level of the adrenal glands without contrast injection. Coronal and   sagittal images have been submitted.    Comparison CT: July 7, 2017.    Findings:     Lungs, Pleura, and Airways: Mild bibasilar atelectasis. No lobar   consolidation, pleural effusion or pneumothorax. Tracheobronchial tree is   grossly patent.    Mediastinum/Lymph Nodes:No enlarged thoracic lymph nodes.    Heart/Great Vessels: Stable mild cardiomegaly. Unchanged dilated main   pulmonary artery, measuring 4.6 cm, suggestive of underlying pulmonary   hypertension. Atherosclerotic calcification of the thoracic aorta and   coronary arteries. No pericardial effusion.    Bones and soft tissues: Degenerative changes of the spine. No acute   osseous abnormality.    IMPRESSION:    1. Since July 7, 2017, no CT evidence of acute intrathoracic pathology.    2. Unchanged dilated main pulmonary artery, measuring 4.6 cm, suggestive   of underlying pulmonary hypertension.                        BRENDEN SANTILLAN M.D., ATTENDING RADIOLOGIST  This document has been electronically signed. Aug 21 2019  3:15PM             (08-21-19 @ 12:06)      CARDIOLOGY TESTING  12 Lead ECG:   Ventricular Rate 53 BPM    Atrial Rate 53 BPM    P-R Interval 164 ms    QRS Duration 88 ms    Q-T Interval 432 ms    QTC Calculation(Bezet) 405 ms    P Axis 44 degrees    R Axis 105 degrees    T Axis 50 degrees    Diagnosis Line Sinus bradycardia  Rightward axis  Borderline ECG    Confirmed by DEL VILLALPANDO MD ([id number]) on 8/21/2019 1:27:31 PM (08-21-19 @ 06:51)  12 Lead ECG:   Ventricular Rate 78 BPM    Atrial Rate 78 BPM    P-R Interval 172 ms    QRS Duration 94 ms    Q-T Interval 404 ms    QTC Calculation(Bezet) 460 ms    P Axis 48 degrees    R Axis 80 degrees    T Axis 70 degrees    Diagnosis Line Normal sinus rhythm  Possible Left atrial enlargement  Incomplete right bundle branch block  Possible Septal infarct , age undetermined  Abnormal ECG    Confirmed by DEL VILLALPANDO MD ([id number]) on 8/19/2019 11:39:54 AM (08-19-19 @ 06:40)      MEDICATIONS  cefepime   IVPB 2000  chlorhexidine 4% Liquid 1  furosemide   Injectable 40  heparin  Injectable 5000  metoprolol tartrate 25  pantoprazole    Tablet 40  predniSONE   Tablet 40  silver sulfADIAZINE 1% Cream 1  tamsulosin 0.4      ANTIBIOTICS:  cefepime   IVPB 2000 milliGRAM(s) IV Intermittent every 12 hours

## 2019-08-22 NOTE — PROGRESS NOTE ADULT - SUBJECTIVE AND OBJECTIVE BOX
Patient is a 59y old  Male who presents with a chief complaint of CHF exacerbation (22 Aug 2019 06:05)      T(F): 96 (08-22-19 @ 03:00), Max: 97.5 (08-21-19 @ 15:00)  HR: 55 (08-22-19 @ 05:00)  BP: 121/56 (08-22-19 @ 05:00)  RR: 25 (08-22-19 @ 05:00)  SpO2: 93% (08-22-19 @ 05:00) (85% - 97%)    PHYSICAL EXAM:  GENERAL: NAD, well-groomed, well-developed  HEAD:  Atraumatic, Normocephalic  EYES: EOMI, PERRLA, conjunctiva and sclera clear  ENMT: No tonsillar erythema, exudates, or enlargement; Moist mucous membranes, Good dentition, No lesions  NECK: Supple, No JVD, Normal thyroid  NERVOUS SYSTEM:  Alert & Oriented X3,  Motor Strength 5/5 B/L upper and lower extremities  CHEST/LUNG: Clear to percussion bilaterally; No rales, rhonchi, wheezing, or rubs  HEART: Regular rate and rhythm; No murmurs, rubs, or gallops  ABDOMEN: Soft, Nontender, Nondistended; Bowel sounds present  EXTREMITIES:   No clubbing, cyanosis, or edema  LYMPH: No lymphadenopathy noted  SKIN: No rashes or lesions    labs  08-21    141  |  99  |  52<H>  ----------------------------<  125<H>  5.0   |  33<H>  |  2.1<H>    Ca    8.6      21 Aug 2019 06:07  Mg     2.3     08-21                            15.7   19.63 )-----------( 160      ( 21 Aug 2019 06:07 )             56.0               ALBUTerol/ipratropium for Nebulization 3 milliLiter(s) Nebulizer every 6 hours PRN  cefepime   IVPB 2000 milliGRAM(s) IV Intermittent every 12 hours  chlorhexidine 4% Liquid 1 Application(s) Topical two times a day  furosemide   Injectable 40 milliGRAM(s) IV Push daily  heparin  Injectable 5000 Unit(s) SubCutaneous every 8 hours  metoprolol tartrate 25 milliGRAM(s) Oral two times a day  pantoprazole    Tablet 40 milliGRAM(s) Oral before breakfast  predniSONE   Tablet 40 milliGRAM(s) Oral daily  silver sulfADIAZINE 1% Cream 1 Application(s) Topical two times a day  tamsulosin 0.4 milliGRAM(s) Oral at bedtime Patient is a 59y old  Male who presents with a chief complaint of CHF exacerbation (22 Aug 2019 06:05)      T(F): 96 (08-22-19 @ 03:00), Max: 97.5 (08-21-19 @ 15:00)  HR: 55 (08-22-19 @ 05:00)  BP: 121/56 (08-22-19 @ 05:00)  RR: 25 (08-22-19 @ 05:00)  SpO2: 93% (08-22-19 @ 05:00) (85% - 97%)    PHYSICAL EXAM:  GENERAL: NAD, well-groomed, well-developed  HEAD:  Atraumatic, Normocephalic  EYES: EOMI, PERRLA, conjunctiva and sclera clear  ENMT: No tonsillar erythema, exudates, or enlargement; Moist mucous membranes, Good dentition, No lesions  NECK: Supple, No JVD, Normal thyroid  NERVOUS SYSTEM:  Alert & Oriented X3,  Motor Strength 5/5 B/L upper and lower extremities  CHEST/LUNG: Clear to percussion bilaterally; No rales, rhonchi, wheezing, or rubs  HEART: Regular rate and rhythm; No murmurs, rubs, or gallops  ABDOMEN: Soft, Nontender, Nondistended; Bowel sounds present  EXTREMITIES:   No clubbing, cyanosis, Stasis derm bandaged  LYMPH: No lymphadenopathy noted  SKIN: No rashes or lesions    labs  08-21    141  |  99  |  52<H>  ----------------------------<  125<H>  5.0   |  33<H>  |  2.1<H>    Ca    8.6      21 Aug 2019 06:07  Mg     2.3     08-21                            15.7   19.63 )-----------( 160      ( 21 Aug 2019 06:07 )             56.0               ALBUTerol/ipratropium for Nebulization 3 milliLiter(s) Nebulizer every 6 hours PRN  cefepime   IVPB 2000 milliGRAM(s) IV Intermittent every 12 hours  chlorhexidine 4% Liquid 1 Application(s) Topical two times a day  furosemide   Injectable 40 milliGRAM(s) IV Push daily  heparin  Injectable 5000 Unit(s) SubCutaneous every 8 hours  metoprolol tartrate 25 milliGRAM(s) Oral two times a day  pantoprazole    Tablet 40 milliGRAM(s) Oral before breakfast  predniSONE   Tablet 40 milliGRAM(s) Oral daily  silver sulfADIAZINE 1% Cream 1 Application(s) Topical two times a day  tamsulosin 0.4 milliGRAM(s) Oral at bedtime

## 2019-08-22 NOTE — PROGRESS NOTE ADULT - SUBJECTIVE AND OBJECTIVE BOX
[facility] FOLLOW UP NOTE  --------------------------------------------------------------------------------  Chief Complaint:    24 hour events/subjective:  Events over last 24hr noted.  Pt has lost nearly 9 lbs total.  States SOB much improved.  Hemodynamically improved.  No more fever.  Last 24hrs fluid balance negative nearly 1L        PAST HISTORY  --------------------------------------------------------------------------------  No significant changes to PMH, PSH, FHx, SHx, unless otherwise noted    ALLERGIES & MEDICATIONS  --------------------------------------------------------------------------------  Allergies    Allergy Status Unknown    Intolerances      Standing Inpatient Medications  cefepime   IVPB 2000 milliGRAM(s) IV Intermittent every 12 hours  chlorhexidine 4% Liquid 1 Application(s) Topical two times a day  collagenase Ointment 1 Application(s) Topical two times a day  Dakins Solution - 1/2 Strength 1 Application(s) Topical two times a day  furosemide    Tablet 60 milliGRAM(s) Oral daily  heparin  Injectable 5000 Unit(s) SubCutaneous every 8 hours  pantoprazole    Tablet 40 milliGRAM(s) Oral before breakfast  predniSONE   Tablet 40 milliGRAM(s) Oral daily  silver sulfADIAZINE 1% Cream 1 Application(s) Topical two times a day  tamsulosin 0.4 milliGRAM(s) Oral at bedtime    PRN Inpatient Medications  ALBUTerol/ipratropium for Nebulization 3 milliLiter(s) Nebulizer every 6 hours PRN      REVIEW OF SYSTEMS  --------------------------------------------------------------------------------  Gen: No weight changes, fatigue, fevers/chills, weakness  Skin: No rashes  Head/Eyes/Ears/Mouth: No headache; Normal hearing; Normal vision w/o blurriness; No sinus pain/discomfort, sore throat  Respiratory: No dyspnea, cough, wheezing, hemoptysis  CV: No chest pain, PND, orthopnea  GI: No abdominal pain, diarrhea, constipation, nausea, vomiting, melena, hematochezia  : No increased frequency, dysuria, hematuria, nocturia  MSK: No joint pain/swelling; no back pain; no edema  Neuro: No dizziness/lightheadedness, weakness, seizures, numbness, tingling  Heme: No easy bruising or bleeding  Endo: No heat/cold intolerance  Psych: No significant nervousness, anxiety, stress, depression    All other systems were reviewed and are negative, except as noted.    VITALS/PHYSICAL EXAM  --------------------------------------------------------------------------------  T(C): 36 (08-22-19 @ 15:06), Max: 36.3 (08-21-19 @ 19:00)  HR: 51 (08-22-19 @ 08:54) (51 - 70)  BP: 99/57 (08-22-19 @ 08:54) (92/44 - 126/63)  RR: 20 (08-22-19 @ 15:06) (18 - 324)  SpO2: 95% (08-22-19 @ 08:54) (85% - 95%)  Wt(kg): --        08-21-19 @ 07:01  -  08-22-19 @ 07:00  --------------------------------------------------------  IN: 530 mL / OUT: 1475 mL / NET: -945 mL    08-22-19 @ 07:01  -  08-22-19 @ 16:32  --------------------------------------------------------  IN: 570 mL / OUT: 800 mL / NET: -230 mL      Physical Exam:  	Gen: NAD, breathing comfortably, morbidly obese  	HEENT: PERRL, supple neck, clear oropharynx  	Pulm: CTA B/L  	CV: RRR, S1S2; no rub  	Back: No spinal or CVA tenderness; no sacral edema  	Abd: +BS, soft, nontender/nondistended  	: No suprapubic tenderness  	UE: Warm, FROM, no clubbing, intact strength; no edema  	LE: Warm, FROM, no clubbing, intact strength; + edema  	Neuro: No focal deficits  	Psych: Normal affect and mood  	Skin: Warm, without rashes; chronic venous stasis changes w/ ulcerations pre-tib areas  	Vascular access:    LABS/STUDIES  --------------------------------------------------------------------------------              16.1   14.90 >-----------<  127      [08-22-19 @ 06:12]              57.2     141  |  100  |  66  ----------------------------<  105      [08-22-19 @ 06:12]  5.2   |  31  |  2.1        Ca     8.9     [08-22-19 @ 06:12]      Mg     2.3     [08-22-19 @ 06:12]            Creatinine Trend:  SCr 2.1 [08-22 @ 06:12]  SCr 2.1 [08-21 @ 06:07]  SCr 1.7 [08-20 @ 11:47]  SCr 1.8 [08-20 @ 06:22]  SCr 1.3 [08-19 @ 05:30]    Urinalysis - [08-19-19 @ 00:30]      Color See Note / Appearance Cloudy / SG 1.020 / pH 5.5      Gluc Negative / Ketone Negative  / Bili Negative / Urobili 1.0       Blood Large / Protein 100 / Leuk Est Negative / Nitrite Negative      RBC >50 / WBC 1-2 / Hyaline  / Gran  / Sq Epi  / Non Sq Epi Occasional / Bacteria Few    Urine Creatinine 112      [08-20-19 @ 12:15]  Urine Sodium 46.0      [08-20-19 @ 12:15]  Urine Urea Nitrogen 307      [08-20-19 @ 12:16]  Urine Potassium 36      [08-20-19 @ 12:15]  Urine Chloride 52      [08-20-19 @ 12:15]  Urine Osmolality 336      [08-20-19 @ 12:16]    HbA1c 6.2      [08-19-19 @ 05:30]    HCV 0.09, Nonreact      [08-19-19 @ 05:30]

## 2019-08-22 NOTE — PROGRESS NOTE ADULT - SUBJECTIVE AND OBJECTIVE BOX
Patient is a 59y old  Male who presents with a chief complaint of CHF exacerbation (22 Aug 2019 06:44)    OVER NIGHT EVENTS:  Doing well overnight; No AVAPS overnight;     PHYSICAL EXAM    ICU Vital Signs Last 24 Hrs  T(C): 35.6 (22 Aug 2019 07:01), Max: 36.4 (21 Aug 2019 15:00)  T(F): 96 (22 Aug 2019 07:01), Max: 97.5 (21 Aug 2019 15:00)  HR: 55 (22 Aug 2019 05:00) (53 - 70)  BP: 92/44 (22 Aug 2019 07:01) (90/54 - 126/63)  BP(mean): 64 (22 Aug 2019 07:01) (64 - 90)  RR: 20 (22 Aug 2019 07:01) (19 - 324)  SpO2: 92% (22 Aug 2019 07:01) (85% - 95%)    I&O's Detail    21 Aug 2019 07:01  -  22 Aug 2019 07:00  --------------------------------------------------------  IN:    Oral Fluid: 480 mL    Solution: 50 mL  Total IN: 530 mL    OUT:    Indwelling Catheter - Urethral: 1475 mL  Total OUT: 1475 mL    Total NET: -945 mL    General: Comfortable in bed  HEENT:  On NC  Lymph node: No palpable LN             Lungs: RONAK over bases   Cardiovascular: RRR, S1S2  Abdomen: BS+ve; soft non tender  Extremities: Bilateral LE edema   Skin:  Venous changes   Neurological:  AAOx3; No focal deficit      LABS:                          16.1   14.90 )-----------( 127      ( 22 Aug 2019 06:12 )             57.2   08-22    141  |  100  |  66<HH>  ----------------------------<  105<H>  5.2<H>   |  31  |  2.1<H>    Ca    8.9      22 Aug 2019 06:12  Mg     2.3     08-22    Lactate (08-19-19 @ 05:30): 1.3  Lactate (08-18-19 @ 23:30): 1.7  Lactate (08-18-19 @ 23:10): 1.6      MEDICATIONS  (STANDING):  cefepime   IVPB 2000 milliGRAM(s) IV Intermittent every 12 hours  chlorhexidine 4% Liquid 1 Application(s) Topical two times a day  furosemide   Injectable 40 milliGRAM(s) IV Push daily  heparin  Injectable 5000 Unit(s) SubCutaneous every 8 hours  metoprolol tartrate 25 milliGRAM(s) Oral two times a day  pantoprazole    Tablet 40 milliGRAM(s) Oral before breakfast  predniSONE   Tablet 40 milliGRAM(s) Oral daily  silver sulfADIAZINE 1% Cream 1 Application(s) Topical two times a day  tamsulosin 0.4 milliGRAM(s) Oral at bedtime    MEDICATIONS  (PRN):  ALBUTerol/ipratropium for Nebulization 3 milliLiter(s) Nebulizer every 6 hours PRN Bronchospasm    Radiology:  < from: CT Chest No Cont (08.21.19 @ 12:06) >  IMPRESSION:    1. Since July 7, 2017, no CT evidence of acute intrathoracic pathology.    2. Unchanged dilated main pulmonary artery, measuring 4.6 cm, suggestive   of underlying pulmonary hypertension.    < end of copied text >

## 2019-08-23 LAB
ANION GAP SERPL CALC-SCNC: 7 MMOL/L — SIGNIFICANT CHANGE UP (ref 7–14)
ANION GAP SERPL CALC-SCNC: 8 MMOL/L — SIGNIFICANT CHANGE UP (ref 7–14)
ANION GAP SERPL CALC-SCNC: 9 MMOL/L — SIGNIFICANT CHANGE UP (ref 7–14)
BASOPHILS # BLD AUTO: 0.01 K/UL — SIGNIFICANT CHANGE UP (ref 0–0.2)
BASOPHILS NFR BLD AUTO: 0.1 % — SIGNIFICANT CHANGE UP (ref 0–1)
BUN SERPL-MCNC: 64 MG/DL — CRITICAL HIGH (ref 10–20)
BUN SERPL-MCNC: 67 MG/DL — CRITICAL HIGH (ref 10–20)
BUN SERPL-MCNC: 68 MG/DL — CRITICAL HIGH (ref 10–20)
CALCIUM SERPL-MCNC: 9.3 MG/DL — SIGNIFICANT CHANGE UP (ref 8.5–10.1)
CHLORIDE SERPL-SCNC: 100 MMOL/L — SIGNIFICANT CHANGE UP (ref 98–110)
CHLORIDE SERPL-SCNC: 101 MMOL/L — SIGNIFICANT CHANGE UP (ref 98–110)
CHLORIDE SERPL-SCNC: 99 MMOL/L — SIGNIFICANT CHANGE UP (ref 98–110)
CO2 SERPL-SCNC: 33 MMOL/L — HIGH (ref 17–32)
CO2 SERPL-SCNC: 36 MMOL/L — HIGH (ref 17–32)
CO2 SERPL-SCNC: 37 MMOL/L — HIGH (ref 17–32)
CREAT SERPL-MCNC: 1.6 MG/DL — HIGH (ref 0.7–1.5)
CREAT SERPL-MCNC: 1.7 MG/DL — HIGH (ref 0.7–1.5)
CREAT SERPL-MCNC: 1.8 MG/DL — HIGH (ref 0.7–1.5)
EOSINOPHIL # BLD AUTO: 0.01 K/UL — SIGNIFICANT CHANGE UP (ref 0–0.7)
EOSINOPHIL NFR BLD AUTO: 0.1 % — SIGNIFICANT CHANGE UP (ref 0–8)
GLUCOSE SERPL-MCNC: 106 MG/DL — HIGH (ref 70–99)
GLUCOSE SERPL-MCNC: 153 MG/DL — HIGH (ref 70–99)
GLUCOSE SERPL-MCNC: 182 MG/DL — HIGH (ref 70–99)
HCT VFR BLD CALC: 58.6 % — HIGH (ref 42–52)
HGB BLD-MCNC: 16.6 G/DL — SIGNIFICANT CHANGE UP (ref 14–18)
IMM GRANULOCYTES NFR BLD AUTO: 0.4 % — HIGH (ref 0.1–0.3)
LYMPHOCYTES # BLD AUTO: 0.46 K/UL — LOW (ref 1.2–3.4)
LYMPHOCYTES # BLD AUTO: 5.1 % — LOW (ref 20.5–51.1)
MAGNESIUM SERPL-MCNC: 2.3 MG/DL — SIGNIFICANT CHANGE UP (ref 1.8–2.4)
MCHC RBC-ENTMCNC: 25.5 PG — LOW (ref 27–31)
MCHC RBC-ENTMCNC: 28.3 G/DL — LOW (ref 32–37)
MCV RBC AUTO: 89.9 FL — SIGNIFICANT CHANGE UP (ref 80–94)
MONOCYTES # BLD AUTO: 0.74 K/UL — HIGH (ref 0.1–0.6)
MONOCYTES NFR BLD AUTO: 8.1 % — SIGNIFICANT CHANGE UP (ref 1.7–9.3)
NEUTROPHILS # BLD AUTO: 7.82 K/UL — HIGH (ref 1.4–6.5)
NEUTROPHILS NFR BLD AUTO: 86.2 % — HIGH (ref 42.2–75.2)
NRBC # BLD: 0 /100 WBCS — SIGNIFICANT CHANGE UP (ref 0–0)
PLATELET # BLD AUTO: 129 K/UL — LOW (ref 130–400)
POTASSIUM SERPL-MCNC: 5.8 MMOL/L — HIGH (ref 3.5–5)
POTASSIUM SERPL-MCNC: 6 MMOL/L — CRITICAL HIGH (ref 3.5–5)
POTASSIUM SERPL-MCNC: 6.5 MMOL/L — CRITICAL HIGH (ref 3.5–5)
POTASSIUM SERPL-SCNC: 5.8 MMOL/L — HIGH (ref 3.5–5)
POTASSIUM SERPL-SCNC: 6 MMOL/L — CRITICAL HIGH (ref 3.5–5)
POTASSIUM SERPL-SCNC: 6.5 MMOL/L — CRITICAL HIGH (ref 3.5–5)
RBC # BLD: 6.52 M/UL — HIGH (ref 4.7–6.1)
RBC # FLD: 18.1 % — HIGH (ref 11.5–14.5)
SODIUM SERPL-SCNC: 142 MMOL/L — SIGNIFICANT CHANGE UP (ref 135–146)
SODIUM SERPL-SCNC: 144 MMOL/L — SIGNIFICANT CHANGE UP (ref 135–146)
SODIUM SERPL-SCNC: 144 MMOL/L — SIGNIFICANT CHANGE UP (ref 135–146)
WBC # BLD: 9.08 K/UL — SIGNIFICANT CHANGE UP (ref 4.8–10.8)
WBC # FLD AUTO: 9.08 K/UL — SIGNIFICANT CHANGE UP (ref 4.8–10.8)

## 2019-08-23 PROCEDURE — 73562 X-RAY EXAM OF KNEE 3: CPT | Mod: 26,RT

## 2019-08-23 RX ORDER — ACETAMINOPHEN 500 MG
650 TABLET ORAL EVERY 6 HOURS
Refills: 0 | Status: COMPLETED | OUTPATIENT
Start: 2019-08-23 | End: 2019-08-24

## 2019-08-23 RX ORDER — IPRATROPIUM/ALBUTEROL SULFATE 18-103MCG
3 AEROSOL WITH ADAPTER (GRAM) INHALATION EVERY 8 HOURS
Refills: 0 | Status: DISCONTINUED | OUTPATIENT
Start: 2019-08-23 | End: 2019-08-24

## 2019-08-23 RX ORDER — SODIUM POLYSTYRENE SULFONATE 4.1 MEQ/G
15 POWDER, FOR SUSPENSION ORAL ONCE
Refills: 0 | Status: COMPLETED | OUTPATIENT
Start: 2019-08-23 | End: 2019-08-23

## 2019-08-23 RX ORDER — CIPROFLOXACIN LACTATE 400MG/40ML
750 VIAL (ML) INTRAVENOUS
Refills: 0 | Status: DISCONTINUED | OUTPATIENT
Start: 2019-08-23 | End: 2019-08-30

## 2019-08-23 RX ADMIN — HEPARIN SODIUM 5000 UNIT(S): 5000 INJECTION INTRAVENOUS; SUBCUTANEOUS at 21:34

## 2019-08-23 RX ADMIN — Medication 60 MILLIGRAM(S): at 10:19

## 2019-08-23 RX ADMIN — Medication 650 MILLIGRAM(S): at 06:44

## 2019-08-23 RX ADMIN — Medication 40 MILLIGRAM(S): at 05:55

## 2019-08-23 RX ADMIN — CEFEPIME 100 MILLIGRAM(S): 1 INJECTION, POWDER, FOR SOLUTION INTRAMUSCULAR; INTRAVENOUS at 05:54

## 2019-08-23 RX ADMIN — Medication 1 APPLICATION(S): at 18:07

## 2019-08-23 RX ADMIN — HEPARIN SODIUM 5000 UNIT(S): 5000 INJECTION INTRAVENOUS; SUBCUTANEOUS at 15:46

## 2019-08-23 RX ADMIN — PANTOPRAZOLE SODIUM 40 MILLIGRAM(S): 20 TABLET, DELAYED RELEASE ORAL at 06:01

## 2019-08-23 RX ADMIN — TAMSULOSIN HYDROCHLORIDE 0.4 MILLIGRAM(S): 0.4 CAPSULE ORAL at 21:34

## 2019-08-23 RX ADMIN — Medication 1 APPLICATION(S): at 05:55

## 2019-08-23 RX ADMIN — Medication 1 APPLICATION(S): at 06:09

## 2019-08-23 RX ADMIN — SODIUM POLYSTYRENE SULFONATE 15 GRAM(S): 4.1 POWDER, FOR SUSPENSION ORAL at 21:34

## 2019-08-23 RX ADMIN — CHLORHEXIDINE GLUCONATE 1 APPLICATION(S): 213 SOLUTION TOPICAL at 10:18

## 2019-08-23 RX ADMIN — CHLORHEXIDINE GLUCONATE 1 APPLICATION(S): 213 SOLUTION TOPICAL at 18:09

## 2019-08-23 RX ADMIN — Medication 750 MILLIGRAM(S): at 18:08

## 2019-08-23 RX ADMIN — Medication 650 MILLIGRAM(S): at 05:54

## 2019-08-23 RX ADMIN — Medication 1 APPLICATION(S): at 18:09

## 2019-08-23 NOTE — PROGRESS NOTE ADULT - ASSESSMENT
RX priscilla. Out greater than in . Check lytes  , Echo mod lvh EF 55%. Adjust lasix if needed . Physical thearpy.Prognosis guarded. Patient aware stable

## 2019-08-23 NOTE — PROGRESS NOTE ADULT - ASSESSMENT
58yo Male w/ PMH of HTN, COPD (on Home O2 as needed), CHF BIBA c/o SOB     #) Acute Hypoxic resp failure 2/2 Acute on chronic diastolic heart failure  - admitted to telemetry  - Echo 08/2019 was suboptimal study shows Normal EF with LV Moderate concentric hypertrophy  - Duplex b/l LE neg  - Lasix PO 60mg daily  - Euvolemic at this time  - Monitor I & O, Cr, and daily weight  - d/c aarti    #) Acute hypercapnic respiratory failure secondary to COPD exacerbation with chronic CO2 retention 2/2 OHS and COPD  - Respiratory acidosis on presentation  - Improved on AVAPS, cont 4 on 4 off and QHS  - Keep O2 88-92%  - Prednisone 40mg daily 5/5 days  - Neb q4hr and PRN  - Will need AVAPS on discharge    #) Sepsis secondary to Pseudomonas stutzeri bacteremia and LE cellulitis  - T max 102 with leukocytosis and normal lactate on presentation  - Afebrile now  - UA neg  - Blood culture shows Pseudomonas stutzeri. Pan sensitive  - Finished Cefepime 2000mg Q12 (4 days). Start Cipro 750mg q12 (5/10 days)  - F/U repeat Blood culture  #) OLIVE on CKD Improving  - Cr today 1.7 (baseline 1.3)  - Possible Cardiorenal vs Diuretic induced vs Hypotension induced vs Vancomycin induced  - Hold lisinopril  - FeUrea 13% likely prerenal  - Nephrology consulted and recommended to decrease lasix

## 2019-08-23 NOTE — PROGRESS NOTE ADULT - SUBJECTIVE AND OBJECTIVE BOX
58yo Male w/ PMH of HTN, COPD (on Home O2 as needed), CHF BIBA c/o SOB that started day prior to admission . Associated w/ cough w/ clear sputum. Pt reports chronically having SOB and cough due to COPD, but reports new onset of more severe SOB  Pt also reports having fever/chills. Pt thinks he has an infection from his legs. + severe chronic venous stasis and ulcers b/l. Pt sees vascular surgeon weekly for his condition. In ED, pt was in acute distress due to dyspnea. BNP 3100s noted. CXR shows vascular congestion b/l. IV lasix 80mg given and BIPAP applied w/ great relief. Pt was very hypertensive w/ SBP > 200, nitro drip started. Pt was febrile w/ Tmax of 103.4. Pt denies any CP, palpitation, abd pain, N/V/D, dysuria, calf pain, or dizziness/lightheadedness. now in CCU - sepesis gram -     interval - noted seen by renal; reviewed chart ; repeat cultures pending     PAST MEDICAL & SURGICAL HISTORY:  Chronic CHF  COPD, severe  Hypertension    MEDICATIONS  (STANDING):  ALBUTerol/ipratropium for Nebulization 3 milliLiter(s) Nebulizer every 8 hours  chlorhexidine 4% Liquid 1 Application(s) Topical two times a day  ciprofloxacin     Tablet 750 milliGRAM(s) Oral two times a day  collagenase Ointment 1 Application(s) Topical two times a day  Dakins Solution - 1/2 Strength 1 Application(s) Topical two times a day  furosemide    Tablet 60 milliGRAM(s) Oral daily  heparin  Injectable 5000 Unit(s) SubCutaneous every 8 hours  pantoprazole    Tablet 40 milliGRAM(s) Oral before breakfast  silver sulfADIAZINE 1% Cream 1 Application(s) Topical two times a day  tamsulosin 0.4 milliGRAM(s) Oral at bedtime    MEDICATIONS  (PRN):  acetaminophen   Tablet .. 650 milliGRAM(s) Oral every 6 hours PRN Mild Pain (1 - 3)  ALBUTerol/ipratropium for Nebulization 3 milliLiter(s) Nebulizer every 6 hours PRN Bronchospasm    ICU Vital Signs Last 24 Hrs  T(C): 35.8 (23 Aug 2019 15:00), Max: 36.1 (23 Aug 2019 07:10)  T(F): 96.5 (23 Aug 2019 15:00), Max: 97 (23 Aug 2019 07:10)  HR: 66 (23 Aug 2019 07:10) (58 - 67)  BP: 123/56 (23 Aug 2019 07:10) (123/56 - 137/68)  BP(mean): 80 (23 Aug 2019 07:10) (77 - 93)  ABP: --  ABP(mean): --  RR: 20 (23 Aug 2019 15:00) (18 - 24)  SpO2: 94% (23 Aug 2019 07:10) (94% - 98%)    GENERAL :  up and alert sitting in chair   HEENT: PERRLA EOMI  LUNGS: decr bs   CARD: s1 s2 rrr  abd: obese   ext : wrapped   neuro - Alert Awake Ox 3                           16.6   9.08  )-----------( 129      ( 23 Aug 2019 06:23 )             58.6   08-23    144  |  100  |  64<HH>  ----------------------------<  153<H>  5.8<H>   |  37<H>  |  1.6<H>    Ca    9.3      23 Aug 2019 15:42  Mg     2.3     08-23    Culture - Blood (08.22.19 @ 06:12)    Specimen Source: .Blood None    Culture Results:   No growth to date.    Culture - Blood (08.22.19 @ 06:12)    Specimen Source: .Blood None    Culture Results:   No growth to date.

## 2019-08-23 NOTE — PROGRESS NOTE ADULT - ASSESSMENT
60yo Male w/ PMH of HTN, COPD (on Home O2 as needed), CHF BIBA c/o SOB .    Non-oliguric OLIVE due to massive drop in BP, from ~ 230 down to 90's systolic in a matter of a few hours, causing a severe pre-renal state.  Thankfully pt remains non-oliguric w/ stabilized BP, and therefore unlikely to have sustained an ischemic injury.    Volume overload which is subjectively and objectively improving    Recommendations:    1)  As long as negative balance persists, can continue PO Lasix.    2)  Daily BMP to monitor renal function and electrolytes, hyperK likely due to hemolyzed sample    3)  Daily weights

## 2019-08-23 NOTE — CONSULT NOTE ADULT - SUBJECTIVE AND OBJECTIVE BOX
Patient is a 59y old  Male who presents with a chief complaint of CHF exacerbation (19 Aug 2019 07:35)      HPI:  60yo Male w/ PMH of HTN, COPD (on Home O2 as needed), CHF BIBA c/o SOB that started today. Associated w/ cough w/ clear sputum. Pt reports chronically having SOB and cough due to COPD, but reports new onset of more severe SOB today. Pt also reports having fever/chills. Pt thinks he has an infection from his legs. + severe chronic venous stasis and ulcers b/l. Pt sees vascular surgeon weekly for his condition. In ED, pt was in acute distress due to dyspnea. BNP 3100s noted. CXR shows vascular congestion b/l. IV lasix 80mg given and BIPAP applied w/ great relief. Pt was very hypertensive w/ SBP > 200, nitro drip started. Pt was febrile w/ Tmax of 103.4. Pt denies any CP, palpitation, abd pain, N/V/D, dysuria, calf pain, or dizziness/lightheadedness. (19 Aug 2019 02:37)      PAST MEDICAL & SURGICAL HISTORY:  Chronic CHF  COPD, severe  Hypertension    Allergies    Allergy Status Unknown    Intolerances      Family history : no cardiovscular family history   Home Medications:  furosemide 40 mg oral tablet: 1 tab(s) orally once a day (19 Aug 2019 02:14)  lisinopril 40 mg oral tablet: 1 tab(s) orally once a day (19 Aug 2019 02:14)  metoprolol tartrate 25 mg oral tablet: 1 tab(s) orally 2 times a day (19 Aug 2019 02:14)  tamsulosin 0.4 mg oral capsule: 1 cap(s) orally once a day (19 Aug 2019 02:14)    Occupation:  Alochol: Denied  Smoking: Denied  Drug Use: Denied  Marital Status:         ROS: as in HPI; All other systems reviewed are negative    ICU Vital Signs Last 24 Hrs  T(C): 37.1 (19 Aug 2019 07:10), Max: 39.7 (18 Aug 2019 23:25)  T(F): 98.8 (19 Aug 2019 07:10), Max: 103.4 (18 Aug 2019 23:25)  HR: 73 (19 Aug 2019 08:00) (73 - 98)  BP: 99/47 (19 Aug 2019 07:09) (73/49 - 233/114)  BP(mean): 68 (19 Aug 2019 07:09) (68 - 124)  ABP: --  ABP(mean): --  RR: 21 (19 Aug 2019 08:00) (21 - 34)  SpO2: 92% (19 Aug 2019 08:00) (89% - 98%)        Physical Examination:    General: awake follow command but sleepy     HEENT: Pupils equal, reactive to light.  Symmetric.    PULM: b/l crackles     CVS: Regular rate and rhythm, no murmurs, rubs, or gallops    ABD: Soft, nondistended, nontender, normoactive bowel sounds, no masses    EXT: plus 3 edema redness chonic statsis     SKIN: Warm and well perfused, no rashes noted.    Neurology : no motor or sensory deficit     Musculoskeletal : move all extremity     Lymphatic system: no Palpable node           ABG - ( 19 Aug 2019 08:54 )  pH, Arterial: 7.22  pH, Blood: x     /  pCO2: 88    /  pO2: 99    / HCO3: 36    / Base Excess: 4.4   /  SaO2: 96                  I&O's Detail    18 Aug 2019 07:01  -  19 Aug 2019 07:00  --------------------------------------------------------  IN:  Total IN: 0 mL    OUT:    Indwelling Catheter - Urethral: 750 mL  Total OUT: 750 mL    Total NET: -750 mL            LABS:                        15.9   21.53 )-----------( 150      ( 19 Aug 2019 05:30 )             54.2     19 Aug 2019 05:30    145    |  101    |  23     ----------------------------<  94     3.7     |  33     |  1.3      Ca    8.6        19 Aug 2019 05:30  Phos  3.3       19 Aug 2019 05:30  Mg     1.4       19 Aug 2019 05:30    TPro  6.7    /  Alb  3.2    /  TBili  1.9    /  DBili  x      /  AST  17     /  ALT  12     /  AlkPhos  69     19 Aug 2019 05:30  Amylase x     lipase x          CARDIAC MARKERS ( 18 Aug 2019 23:30 )  x     / <0.01 ng/mL / x     / x     / x          CAPILLARY BLOOD GLUCOSE      POCT Blood Glucose.: 99 mg/dL (19 Aug 2019 04:43)      Urinalysis Basic - ( 19 Aug 2019 00:30 )    Color: See Note / Appearance: Cloudy / S.020 / pH: x  Gluc: x / Ketone: Negative  / Bili: Negative / Urobili: 1.0 mg/dL   Blood: x / Protein: 100 mg/dL / Nitrite: Negative   Leuk Esterase: Negative / RBC: >50 /HPF / WBC 1-2 /HPF   Sq Epi: x / Non Sq Epi: Occasional /HPF / Bacteria: Few      Culture    Lactate, Blood: 1.3 mmol/L (19 @ 05:30)  Lactate, Blood: 1.7 mmol/L (19 @ 23:30)      MEDICATIONS  (STANDING):  cefepime   IVPB 2000 milliGRAM(s) IV Intermittent every 8 hours  chlorhexidine 4% Liquid 1 Application(s) Topical two times a day  furosemide   Injectable 40 milliGRAM(s) IV Push daily  heparin  Injectable 5000 Unit(s) SubCutaneous every 8 hours  lisinopril 40 milliGRAM(s) Oral daily  methylPREDNISolone sodium succinate Injectable 40 milliGRAM(s) IV Push every 6 hours  metoprolol tartrate 25 milliGRAM(s) Oral two times a day  pantoprazole    Tablet 40 milliGRAM(s) Oral before breakfast  tamsulosin 0.4 milliGRAM(s) Oral at bedtime  vancomycin  IVPB 2000 milliGRAM(s) IV Intermittent every 12 hours    MEDICATIONS  (PRN):  ALBUTerol/ipratropium for Nebulization 3 milliLiter(s) Nebulizer every 6 hours PRN Bronchospasm        RADIOLOGY: ***   cardiomegaly mild congestion   CXR:  TLC:  OG:  ET tube:        CAM ICU:  ECHO:
CARDIOLOGY CONSULT NOTE     CHIEF COMPLAINT/REASON FOR CONSULT:    HPI:  60yo Male w/ PMH of HTN, COPD (on Home O2 as needed), CHF BIBA c/o SOB one week . Associated w/ cough w/ clear sputum. Pt reports chronically having SOB and cough due to COPD, but reports new onset of more severe SOB yesterday . Pt also reports having fever/chills. Pt thinks he has an infection from his legs. + severe chronic venous stasis and ulcers b/l. Pt sees vascular surgeon weekly for his condition. In ED, pt was in acute distress due to dyspnea. BNP 3100s noted. CXR shows  possible vascular congestion b/l. IV lasix 80mg given and BIPAP applied w/  relief. Pt was very hypertensive w/ SBP > 200, nitro drip  was started. Now off Pt was febrile w/ Tmax of 103.4. Pt denies any CP, palpitation, abd pain, N/V/D, dysuria, calf pain, or dizziness/lightheadedness. (19 Aug 2019 02:37)      PAST MEDICAL & SURGICAL HISTORY:  Chronic CHF  COPD, severe  Hypertension      Cardiac Risks:   [x ]HTN, [ ] DM, [ ] Smoking, [ ] FH,  [ ] Lipids        MEDICATIONS:  MEDICATIONS  (STANDING):  cefepime   IVPB 2000 milliGRAM(s) IV Intermittent every 8 hours  chlorhexidine 4% Liquid 1 Application(s) Topical two times a day  furosemide   Injectable 40 milliGRAM(s) IV Push daily  heparin  Injectable 5000 Unit(s) SubCutaneous every 8 hours  lisinopril 40 milliGRAM(s) Oral daily  methylPREDNISolone sodium succinate Injectable 40 milliGRAM(s) IV Push every 6 hours  metoprolol tartrate 25 milliGRAM(s) Oral two times a day  pantoprazole    Tablet 40 milliGRAM(s) Oral before breakfast  tamsulosin 0.4 milliGRAM(s) Oral at bedtime  vancomycin  IVPB 2000 milliGRAM(s) IV Intermittent every 12 hours      FAMILY HISTORY:      SOCIAL HISTORY:      [ ] Marital status    Allergies    Allergy Status Unknown      	    REVIEW OF SYSTEMS:  CONSTITUTIONAL: No fever, weight loss, or fatigue  EYES: No eye pain, visual disturbances, or discharge  ENMT:  No difficulty hearing, tinnitus, vertigo; No sinus or throat pain  NECK: No pain or stiffness  RESPIRATORY: No cough, wheezing, chills or hemoptysis; No Shortness of Breath  CARDIOVASCULAR: See above  GASTROINTESTINAL: No abdominal or epigastric pain. No nausea, vomiting, or hematemesis; No diarrhea or constipation. No melena or hematochezia.  GENITOURINARY: No dysuria, frequency, hematuria, or incontinence  NEUROLOGICAL: No headaches, memory loss, loss of strength, numbness, or tremors  SKIN: No itching, burning, rashes, or lesions   	        PHYSICAL EXAM:  T(C): 37.3 (08-19-19 @ 06:00), Max: 39.7 (08-18-19 @ 23:25)  HR: 81 (08-19-19 @ 06:00) (81 - 98)  BP: 156/102 (08-19-19 @ 05:10) (73/49 - 233/114)  RR: 26 (08-19-19 @ 06:00) (22 - 34)  SpO2: 90% (08-19-19 @ 06:00) (89% - 98%)  Wt(kg): --  I&O's Summary    18 Aug 2019 07:01  -  19 Aug 2019 07:00  --------------------------------------------------------  IN: 0 mL / OUT: 750 mL / NET: -750 mL        Appearance: Morbid obesity  Psychiatry: A & O x 3, Mood & affect appropriate  HEENT:   Normal oral mucosa, PERRL, EOMI	  Lymphatic: No lymphadenopathy  Cardiovascular: Normal S1 S2,RRR, No JVD, No murmurs  Respiratory: Lungs clear to auscultation	  Gastrointestinal:  Soft, Non-tender, + BS	  Skin: No rashes, No ecchymoses, No cyanosis	  Neurologic: Non-focal  Extremities: Normal range of motion, No clubbing, cyanosis 1+ bandaged  Vascular: Peripheral pulses palpable 2+ bilaterally      ECG:  	nsr lae inc rbbb ? javad septal scar    	  LABS:	 	    CARDIAC MARKERS:          Serum Pro-Brain Natriuretic Peptide: 3162 pg/mL (08-18 @ 23:30)                            16.7   11.39 )-----------( 166      ( 18 Aug 2019 23:30 )             57.9     08-18    139  |  95<L>  |  20  ----------------------------<  126<H>  4.2   |  33<H>  |  1.2    Ca    8.9      18 Aug 2019 23:30    TPro  8.0  /  Alb  3.8  /  TBili  1.0  /  DBili  x   /  AST  17  /  ALT  14  /  AlkPhos  84  08-18      proBNP: Serum Pro-Brain Natriuretic Peptide: 3162 pg/mL (08-18 @ 23:30)
CATE GALVAN  59y, Male  Allergy: Allergy Status Unknown      CHIEF COMPLAINT: CHF exacerbation (19 Aug 2019 07:13)      HPI:  60yo Male w/ PMH of HTN, COPD (on Home O2 as needed), CHF BIBA c/o SOB that started today. Associated w/ cough w/ clear sputum. Pt reports chronically having SOB and cough due to COPD, but reports new onset of more severe SOB today. Pt also reports having fever/chills. Pt thinks he has an infection from his legs. + severe chronic venous stasis and ulcers b/l. Pt sees vascular surgeon weekly for his condition. In ED, pt was in acute distress due to dyspnea. BNP 3100s noted. CXR shows vascular congestion b/l. IV lasix 80mg given and BIPAP applied w/ great relief. Pt was very hypertensive w/ SBP > 200, nitro drip started. Pt was febrile w/ Tmax of 103.4. Pt denies any CP, palpitation, abd pain, N/V/D, dysuria, calf pain, or dizziness/lightheadedness. (19 Aug 2019 02:37)    FAMILY HISTORY:    PAST MEDICAL & SURGICAL HISTORY:  Chronic CHF  COPD, severe  Hypertension      Substance Use (x  ) never used  (  ) IVDU (  ) Other:  Tobacco Usage:  (x   ) never smoked   (   ) former smoker   (   ) current smoker   Alcohol Usage: (   ) social  (   ) daily use (  x ) denies  Sexual History: Worked as       ROS  General: Denies fevers, chills, nightsweats, weight loss  HEENT: Denies headache, rhinorrhea, sore throat, eye pain  CV: Denies CP, palpitations  PULM: Denies SOB, cough  GI: Denies abdominal pain, diarrhea  : Denies dysuria, hematuria  MSK: Denies arthralgias  SKIN: Denies rash   NEURO: Denies paresthesias, weakness  PSYCH: Denies depression    VITALS:  T(F): 98.8, Max: 103.4 (19 @ 23:25)  HR: 81  BP: 156/102  RR: 21Vital Signs Last 24 Hrs  T(C): 37.1 (19 Aug 2019 07:10), Max: 39.7 (18 Aug 2019 23:25)  T(F): 98.8 (19 Aug 2019 07:10), Max: 103.4 (18 Aug 2019 23:25)  HR: 81 (19 Aug 2019 06:00) (81 - 98)  BP: 156/102 (19 Aug 2019 05:10) (73/49 - 233/114)  BP(mean): 124 (19 Aug 2019 05:10) (82 - 124)  RR: 21 (19 Aug 2019 07:10) (21 - 34)  SpO2: 90% (19 Aug 2019 06:00) (89% - 98%)    PHYSICAL EXAM:  Gen: NAD, resting in bed  HEENT: Normocephalic, atraumatic  Neck: supple, no lymphadenopathy  CV: Regular rate & regular rhythm  Lungs: decreased BS at bases, no fremitus  Abdomen: Soft, BS present, obese  Ext: Warm, well perfused  Neuro: non focal, awake  Skin: LE stasis changes    TESTS & MEASUREMENTS:                        15.9   21.53 )-----------( 150      ( 19 Aug 2019 05:30 )             54.2         139  |  95<L>  |  20  ----------------------------<  126<H>  4.2   |  33<H>  |  1.2    Ca    8.9      18 Aug 2019 23:30    TPro  8.0  /  Alb  3.8  /  TBili  1.0  /  DBili  x   /  AST  17  /  ALT  14  /  AlkPhos  84      eGFR if Non African American: 65 mL/min/1.73M2 (19 @ 23:30)  eGFR if : 76 mL/min/1.73M2 (19 @ 23:30)    LIVER FUNCTIONS - ( 18 Aug 2019 23:30 )  Alb: 3.8 g/dL / Pro: 8.0 g/dL / ALK PHOS: 84 U/L / ALT: 14 U/L / AST: 17 U/L / GGT: x           Urinalysis Basic - ( 19 Aug 2019 00:30 )    Color: See Note / Appearance: Cloudy / S.020 / pH: x  Gluc: x / Ketone: Negative  / Bili: Negative / Urobili: 1.0 mg/dL   Blood: x / Protein: 100 mg/dL / Nitrite: Negative   Leuk Esterase: Negative / RBC: >50 /HPF / WBC 1-2 /HPF   Sq Epi: x / Non Sq Epi: Occasional /HPF / Bacteria: Few          Lactate, Blood: 1.3 mmol/L (19 @ 05:30)  Lactate, Blood: 1.7 mmol/L (19 @ 23:30)  Blood Gas Venous - Lactate: 1.6 mmoL/L (19 @ 23:10)      INFECTIOUS DISEASES TESTING      RADIOLOGY & ADDITIONAL TESTS:  I have personally reviewed the last Chest xray  CXR see below          CARDIOLOGY TESTING      MEDICATIONS  cefepime   IVPB 2000  chlorhexidine 4% Liquid 1  furosemide   Injectable 40  heparin  Injectable 5000  lisinopril 40  methylPREDNISolone sodium succinate Injectable 40  metoprolol tartrate 25  pantoprazole    Tablet 40  tamsulosin 0.4  vancomycin  IVPB 2000      ANTIBIOTICS:  cefepime   IVPB 2000 milliGRAM(s) IV Intermittent every 8 hours  vancomycin  IVPB 2000 milliGRAM(s) IV Intermittent every 12 hours      All available historical data has been reviewed
HPI:  60yo Male w/ PMH of HTN, COPD (on Home O2 as needed), CHF BIBA c/o SOB that started today. Associated w/ cough w/ clear sputum. Pt reports chronically having SOB and cough due to COPD, but reports new onset of more severe SOB today. Pt also reports having fever/chills. Pt thinks he has an infection from his legs. + severe chronic venous stasis and ulcers b/l. Pt sees vascular surgeon weekly for his condition. In ED, pt was in acute distress due to dyspnea. BNP 3100s noted. CXR shows vascular congestion b/l. IV lasix 80mg given and BIPAP applied w/ great relief. Pt was very hypertensive w/ SBP > 200, nitro drip started. Pt was febrile w/ Tmax of 103.4. Pt denies any CP, palpitation, abd pain, N/V/D, dysuria, calf pain, or dizziness/lightheadedness. (19 Aug 2019 02:37)      PAST MEDICAL & SURGICAL HISTORY:  Chronic CHF  COPD, severe  Hypertension      Hospital Course:  Xrays are negative for a right knee fracture. he has a right knee contusion. he has morbid obesity and chronic venous insuff. He has pseudomonas bacteremia.  TODAY'S SUBJECTIVE & REVIEW OF SYMPTOMS:     Constitutional WNL   Cardio WNL   Resp WNL   GI WNL  Heme WNL  Endo WNL  Skin WNL  MSK WNL  Neuro WNL  Cognitive WNL  Psych WNL      MEDICATIONS  (STANDING):  ALBUTerol/ipratropium for Nebulization 3 milliLiter(s) Nebulizer every 8 hours  chlorhexidine 4% Liquid 1 Application(s) Topical two times a day  ciprofloxacin     Tablet 750 milliGRAM(s) Oral two times a day  collagenase Ointment 1 Application(s) Topical two times a day  Dakins Solution - 1/2 Strength 1 Application(s) Topical two times a day  furosemide    Tablet 60 milliGRAM(s) Oral daily  heparin  Injectable 5000 Unit(s) SubCutaneous every 8 hours  pantoprazole    Tablet 40 milliGRAM(s) Oral before breakfast  silver sulfADIAZINE 1% Cream 1 Application(s) Topical two times a day  tamsulosin 0.4 milliGRAM(s) Oral at bedtime    MEDICATIONS  (PRN):  acetaminophen   Tablet .. 650 milliGRAM(s) Oral every 6 hours PRN Mild Pain (1 - 3)  ALBUTerol/ipratropium for Nebulization 3 milliLiter(s) Nebulizer every 6 hours PRN Bronchospasm      FAMILY HISTORY:      Allergies    Allergy Status Unknown    Intolerances        SOCIAL HISTORY:    [  ] Etoh  [  ] Smoking  [  ] Substance abuse     Home Environment:  [x  ] Home Alone  [  ] Lives with Family  [  ] Home Health Aid    Dwelling:  [  ] Apartment  [  ] Private House  [  ] Adult Home  [  ] Skilled Nursing Facility      [  ] Short Term  [  ] Long Term  [  ] Stairs       Elevator [  ]    FUNCTIONAL STATUS PTA: (Check all that apply)  Ambulation: [   ]Independent    [  ] Dependent     [  ] Non-Ambulatory  Assistive Device: [  ] SA Cane  [  ]  Q Cane  [  ] Walker  [  ]  Wheelchair  ADL : [  ] Independent  [  ]  Dependent       Vital Signs Last 24 Hrs  T(C): 35.8 (23 Aug 2019 15:00), Max: 36.1 (23 Aug 2019 07:10)  T(F): 96.5 (23 Aug 2019 15:00), Max: 97 (23 Aug 2019 07:10)  HR: 66 (23 Aug 2019 07:10) (58 - 71)  BP: 123/56 (23 Aug 2019 07:10) (123/56 - 137/68)  BP(mean): 80 (23 Aug 2019 07:10) (77 - 93)  RR: 20 (23 Aug 2019 15:00) (18 - 24)  SpO2: 94% (23 Aug 2019 07:10) (94% - 98%)      PHYSICAL EXAM: Alert & Oriented X3  GENERAL: NAD, well-groomed, well-developed  HEAD:  Atraumatic, Normocephalic  EYES: EOMI, PERRLA, conjunctiva and sclera clear  NECK: Supple, No JVD, Normal thyroid  CHEST/LUNG: Clear to percussion bilaterally; No rales, rhonchi, wheezing, or rubs  HEART: Regular rate and rhythm; No murmurs, rubs, or gallops  ABDOMEN: protruberant  EXTREMITIES:  2+ Peripheral Pulses, No clubbing, cyanosis, or edema    NERVOUS SYSTEM:  Cranial Nerves 2-12 intact [  ] Abnormal  [  ]  ROM: WFL all extremities [  ]  Abnormal [  ]  Motor Strength: WFL all extremities  [  ]  Abnormal [x  ]  4/5  Sensation: intact to light touch [  ] Abnormal [  ]  Reflexes: Symmetric [  ]  Abnormal [  ]    FUNCTIONAL STATUS:  Bed Mobility: Independent [  ]  Supervision [  ]  Needs Assistance [  ]  N/A [  ]  Transfers: Independent [  ]  Supervision [  ]  Needs Assistance [  ]  N/A [  ]   Ambulation: Independent [  ]  Supervision [  ]  Needs Assistance [  ]  N/A [  ]  ADL: Independent [  ] Requires Assistance [  ] N/A [  ]      LABS:                        16.6   9.08  )-----------( 129      ( 23 Aug 2019 06:23 )             58.6     08-23    144  |  100  |  64<HH>  ----------------------------<  153<H>  5.8<H>   |  37<H>  |  1.6<H>    Ca    9.3      23 Aug 2019 15:42  Mg     2.3     08-23            RADIOLOGY & ADDITIONAL STUDIES:    Assesment:
NEPHROLOGY CONSULTATION NOTE  60yo Male w/ PMH of HTN, COPD (on Home O2 as needed), CHF BIBA c/o SOB . Associated w/ cough w/ clear sputum. Pt reports chronically having SOB and cough due to COPD, but reports new onset of more severe SOB today. Pt also reports having fever/chills. Pt thinks he has an infection from his legs. + severe chronic venous stasis and ulcers b/l. Pt sees vascular surgeon weekly for his condition. In ED, pt was in acute distress due to dyspnea. BNP 3100s noted. CXR shows vascular congestion b/l. IV lasix 80mg given and BIPAP applied w/ great relief. Pt was very hypertensive w/ SBP > 200, nitro drip started. Pt was febrile w/ Tmax of 103.4. Pt denies any CP, palpitation, abd pain, N/V/D, dysuria, calf pain, or dizziness/lightheadedness.    Reports worsenign lower extrmeity edema over prior months  IS to take lasix 40 mg PO QD but reports doesnt take regularly    today SOB has improved   was tsarted on lasix 40 mg IV BID    cr shania from 1.3 wehn presented to 2.1 today    PAST MEDICAL & SURGICAL HISTORY:  Chronic CHF  COPD, severe  Hypertension    Allergies:  Allergy Status Unknown    Home Medications Reviewed  Hospital Medications:   MEDICATIONS  (STANDING):  cefepime   IVPB 2000 milliGRAM(s) IV Intermittent every 12 hours  chlorhexidine 4% Liquid 1 Application(s) Topical two times a day  heparin  Injectable 5000 Unit(s) SubCutaneous every 8 hours  metoprolol tartrate 25 milliGRAM(s) Oral two times a day  pantoprazole    Tablet 40 milliGRAM(s) Oral before breakfast  predniSONE   Tablet 40 milliGRAM(s) Oral daily  silver sulfADIAZINE 1% Cream 1 Application(s) Topical two times a day  tamsulosin 0.4 milliGRAM(s) Oral at bedtime      SOCIAL HISTORY:  Denies ETOH,Smoking,   FAMILY HISTORY:        REVIEW OF SYSTEMS:  CONSTITUTIONAL: No weakness, fevers or chills  EYES/ENT: No visual changes;  No vertigo or throat pain   NECK: No pain or stiffness  RESPIRATORY: No cough, wheezing, hemoptysis; No shortness of breath  CARDIOVASCULAR: No chest pain or palpitations.  GASTROINTESTINAL: No abdominal or epigastric pain. No nausea, vomiting, or hematemesis; No diarrhea or constipation. No melena or hematochezia.  GENITOURINARY: No dysuria, frequency, foamy urine, urinary urgency, incontinence or hematuria  NEUROLOGICAL: No numbness or weakness  SKIN: No itching, burning, rashes, or lesions   VASCULAR: No bilateral lower extremity edema.   All other review of systems is negative unless indicated above.    VITALS:  T(F): 97.4 (19 @ 10:40), Max: 97.5 (19 @ 23:01)  HR: 53 (19 @ 10:40)  BP: 90/54 (19 @ 10:40)  RR: 34 (19 @ 10:40)  SpO2: 94% (19 @ 10:40)     @ :  -   @ 07:00  --------------------------------------------------------  IN: 640 mL / OUT: 1220 mL / NET: -580 mL     @ :  -   @ 12:23  --------------------------------------------------------  IN: 0 mL / OUT: 210 mL / NET: -210 mL          19 @ 07:  -  19 @ 07:00  --------------------------------------------------------  IN: 0 mL / OUT: 1220 mL / NET: -1220 mL    19 @ 07:  -  19 @ 12:23  --------------------------------------------------------  IN: 0 mL / OUT: 210 mL / NET: -210 mL      I&O's Detail    20 Aug 2019 07:  -  21 Aug 2019 07:00  --------------------------------------------------------  IN:    Oral Fluid: 540 mL    Solution: 100 mL  Total IN: 640 mL    OUT:    Indwelling Catheter - Urethral: 1220 mL  Total OUT: 1220 mL    Total NET: -580 mL      21 Aug 2019 07:  -  21 Aug 2019 12:23  --------------------------------------------------------  IN:  Total IN: 0 mL    OUT:    Indwelling Catheter - Urethral: 210 mL  Total OUT: 210 mL    Total NET: -210 mL            PHYSICAL EXAM:  Constitutional: NAD  HEENT: anicteric sclera, oropharynx clear, MMM  Neck: No JVD  Respiratory: CTAB, no wheezes, rales or rhonchi  Cardiovascular: S1, S2, RRR  Gastrointestinal: BS+, soft, NT/ND  Extremities: No cyanosis or clubbing. +++ peripheral edema  Neurological: A/O x 3, no focal deficits  Psychiatric: Normal mood, normal affect  : No CVA tenderness. + broussard.   Skin: No rashes  Vascular Access:    LABS:      141  |  99  |  52<H>  ----------------------------<  125<H>  5.0   |  33<H>  |  2.1<H>    Ca    8.6      21 Aug 2019 06:07  Mg     2.3           Creatinine Trend: 2.1 <--, 1.7 <--, 1.8 <--, 1.3 <--, 1.2 <--                        15.7   19.63 )-----------( 160      ( 21 Aug 2019 06:07 )             56.0     Urine Studies:  Urinalysis Basic - ( 19 Aug 2019 00:30 )    Color: See Note / Appearance: Cloudy / S.020 / pH:   Gluc:  / Ketone: Negative  / Bili: Negative / Urobili: 1.0 mg/dL   Blood:  / Protein: 100 mg/dL / Nitrite: Negative   Leuk Esterase: Negative / RBC: >50 /HPF / WBC 1-2 /HPF   Sq Epi:  / Non Sq Epi: Occasional /HPF / Bacteria: Few      Osmolality, Random Urine: 336 mos/kg ( @ 12:16)  Potassium, Random Urine: 36 mmol/L ( @ 12:15)  Sodium, Random Urine: 46.0 mmoL/L ( @ 12:15)  Creatinine, Random Urine: 112 mg/dL ( @ 12:15)  Chloride, Random Urine: 52 ( @ 12:15)            RADIOLOGY & ADDITIONAL STUDIES:

## 2019-08-23 NOTE — PROGRESS NOTE ADULT - ASSESSMENT
IMPRESSION:  ARF on CRF hypercapnia   OLIVE improving   HFpEF   Pulmonary hypertension likely WHO II/III  Pseudomonas bacteremia   Cellulitis lower ext   HO AZIZA/OHS with non compliance to CPAP     PLAN:    CNS: no sedation     HEENT: oral care    PULMONARY: AVAPs as needed and at night; Will require AVAPS at home; nebulizer Q 8 hrs and prn; Finish prednisone course     CARDIOVASCULAR:  Lasix PO 60;  Keep I=<O; Target MAP > 65; Cardio FU    GI: GI prophylaxis. Feeding as tolerated     RENAL: FU lytes;     INFECTIOUS DISEASE: Antibiotics per ID;     HEMATOLOGICAL:  DVT prophylaxis.    ENDOCRINE:  Follow up FS.  Insulin protocol if needed.    MUSCULOSKELETAL: Wound care of LE; Burn eval    Tele   PT rehab

## 2019-08-23 NOTE — PROGRESS NOTE ADULT - ASSESSMENT
60yo Male w/ PMH of HTN, COPD (on Home O2 as needed), CHF BIBA c/o SOB     #) Acute Hypoxic resp failure 2/2 Acute on chronic diastolic heart failure  - admitted to telemetry  - Echo 08/2019 was suboptimal study shows Normal EF with LV Moderate concentric hypertrophy  - Duplex b/l LE neg  - Lasix PO 60mg daily  - Euvolemic at this time  - Monitor I & O, Cr, and daily weight  - d/c aarti    #) Acute hypercapnic respiratory failure secondary to COPD exacerbation with chronic CO2 retention 2/2 OHS and COPD  - Respiratory acidosis on presentation  - Improved on AVAPS, cont 4 on 4 off and QHS  - Keep O2 88-92%  - Prednisone 40mg daily 5/5 days  - Neb q4hr and PRN  - Will need AVAPS on discharge    #) Sepsis secondary to Pseudomonas stutzeri bacteremia and LE cellulitis  - T max 102 with leukocytosis and normal lactate on presentation  - Afebrile now  - UA neg  - Blood culture shows Pseudomonas stutzeri. Pan sensitive  - Finished Cefepime 2000mg Q12 (4 days). Start Cipro 750mg q12 (5/10 days)  - F/U repeat Blood culture  - F/U ID recommendations  - Burn recommended Sana and Dakin    #) OLIVE on CKD Improving  - Cr today 1.7 (baseline 1.3)  - Possible Cardiorenal vs Diuretic induced vs Hypotension induced vs Vancomycin induced  - Hold lisinopril  - FeUrea 13% likely prerenal  - Nephrology consulted and recommended to decrease lasix    #) Bradycardia medication induced  - Hold metoprolol at this time    #) Hyperkalemia resolved  - Likely Secondary ro OLIVE  - Hold Lisinopril  - Insulin and Dextrose  - F/U repeat BMP    DVT ppx Heparin Sq  DASH diet with Fluid restriction  PT/Rehab pending

## 2019-08-23 NOTE — CONSULT NOTE ADULT - ASSESSMENT
IMPRESSION:  ARF on CRF hypercapnia   CHF   ?? cellulitis lower ext   doubt PNA no sign     PLAN:    CNS: no sedation     HEENT: oral care     PULMONARY: place on avap now setting given to RT  ABG in 1 hr   nebulizer Q 4 hrs and prn   lower solumedrol to Q 12 hrs   CARDIOVASCULAR: echo , cardiology consult   continue lasix     GI: GI prophylaxis.  NPO now     RENAL: follow lytes IS and os     INFECTIOUS DISEASE: continue abx , ID consult , pan cx     HEMATOLOGICAL:  DVT prophylaxis. doppler lower ext satat     ENDOCRINE:  Follow up FS.  Insulin protocol if needed.    MUSCULOSKELETAL:        CRITICAL CARE TIME SPENT: ***
Patient with morbid obesity probable obesity hypoventilation hypoxia hct increased. Non compliant with o2 and c-pap . He has probable pulmonary hypertension. . Need culture antibiotics. C-pap iv lasix for now. Try echo. Check thyroid. Prognosis poor.
1yo Male w/ PMH of HTN, COPD (on Home O2 as needed), CHF BIBA c/o SOB .    OLIVE in setting of diuresis, MAP on low side  agree w/ decreased dose lasix  Keep O > I  BNP on low side   clearly total body vol up but like third spacing (alb 3.2)  check UA urine prot/cr ratio  check renal US  will follow
ASSESSMENT  59y M admitted with ACUTE DECOMPENSATED HEART FAILURE      PROBLEMS  1. Pt with  severe chronic venous stasis and ulcers b/l. R/O fever due to cellulitis  New problem with additional W/U  acute illness which poses threat to bodily function    2.  SIRS (T>101F, Pulse>90, Resp Rate>20)    On cefepime   IVPB 2000 milliGRAM(s) IV Intermittent every 8 hours  vancomycin  IVPB 2000 milliGRAM(s) IV Intermittent every 12 hours  methylPREDNISolone sodium succinate Injectable 40    2. Acute HF; Hx COPD    3. Morbid Obesity  BMI (kg/m2): 63.7 (08-19-19 @ 03:42)    PLAN  - Await blood cultures  - Continue Vanco & Cefepime for now  - Vanco trough prior to 4th dose   - Repeat wbc
IMPRESSION: Rehab of  debility, chronic venous insuff, morbid obesity, right knee contusion, pseudomonas bactermia    PRECAUTIONS: [ x ] Cardiac  [  ] Respiratory  [  ] Seizures [  ] Contact Isolation  [  ] Droplet Isolation  [  ] Other    Weight Bearing Status:   WBAT RLE    RECOMMENDATION:    Out of Bed to Chair     DVT/Decubiti Prophylaxis    REHAB PLAN:     [  xx] Bedside P/T 3-5 times a week   [   ]   Bedside O/T  2-3 times a week             [   ] No Rehab Therapy Indicated                   [   ]  Speech Therapy   Conditioning/ROM                                    ADL  Bed Mobility                                               Conditioning/ROM  Transfers                                                     Bed Mobility  Sitting /Standing Balance                         Transfers                                        Gait Training                                               Sitting/Standing Balance  Stair Training [   ]Applicable                    Home equipment Eval                                                                        Splinting  [   ] Only      GOALS:   ADL   [x   ]   Independent                    Transfers  [x   ] Independent                          Ambulation  [ x  ] Independent     [   x ] With device                            [   ]  CG                                                         [   ]  CG                                                                  [   ] CG                            [    ] Min A                                                   [   ] Min A                                                              [   ] Min  A          DISCHARGE PLAN:   [   ]  Good candidate for Intensive Rehabilitation/Hospital based-4A [facility]                                             Will tolerate 3hrs Intensive Rehab Daily                                       [ xx   ]  Short Term Rehab in Skilled Nursing Facility  isn't desired by the patient                                       [ xx   ]  Home with Outpatient or  services                                         [    ]  Possible Candidate for Intensive Hospital based Rehab

## 2019-08-23 NOTE — CHART NOTE - NSCHARTNOTEFT_GEN_A_CORE
Pt was trying to get up to go to bathroom right after waking up from sleep, fell down on R knee by mistake. Pt denies any head trauma or LOC. AAO x 4. PE shows mild tenderness upon palpation of R knee. Hemodynamically stable. Pt was trying to get up to go to bathroom right after waking up from sleep, fell down on R knee by mistake. Pt denies any head trauma or LOC. AAO x 4. PE shows mild tenderness upon palpation of R knee. Xray R knee ordered. Hemodynamically stable.

## 2019-08-23 NOTE — PHYSICAL THERAPY INITIAL EVALUATION ADULT - SPECIFY REASON(S)
Will hold PT eval at this time pending (R) Knee X-ray to r/o fracture. Will follow up and complete eval when indicated.

## 2019-08-23 NOTE — PROGRESS NOTE ADULT - SUBJECTIVE AND OBJECTIVE BOX
SUBJECTIVE:    Patient is a 59y old Male who presents with a chief complaint of CHF exacerbation (23 Aug 2019 09:04)    Currently admitted to medicine with the primary diagnosis of Acute decompensated heart failure     Today is hospital day 4d. This morning he is resting comfortably in bed. Pt reports feeling dizzy when he woke up and fell to his right knee    PAST MEDICAL & SURGICAL HISTORY  Chronic CHF  COPD, severe  Hypertension    SOCIAL HISTORY:  Negative for smoking/alcohol/drug use.     Home Medications:  Home Medications:  furosemide 40 mg oral tablet: 1 tab(s) orally once a day (19 Aug 2019 02:14)  lisinopril 40 mg oral tablet: 1 tab(s) orally once a day (19 Aug 2019 02:14)  metoprolol tartrate 25 mg oral tablet: 1 tab(s) orally 2 times a day (19 Aug 2019 02:14)  tamsulosin 0.4 mg oral capsule: 1 cap(s) orally once a day (19 Aug 2019 02:14)      ALLERGIES:  Allergy Status Unknown    MEDICATIONS:  STANDING MEDICATIONS  chlorhexidine 4% Liquid 1 Application(s) Topical two times a day  ciprofloxacin     Tablet 750 milliGRAM(s) Oral two times a day  collagenase Ointment 1 Application(s) Topical two times a day  Dakins Solution - 1/2 Strength 1 Application(s) Topical two times a day  furosemide    Tablet 60 milliGRAM(s) Oral daily  heparin  Injectable 5000 Unit(s) SubCutaneous every 8 hours  pantoprazole    Tablet 40 milliGRAM(s) Oral before breakfast  predniSONE   Tablet 40 milliGRAM(s) Oral daily  silver sulfADIAZINE 1% Cream 1 Application(s) Topical two times a day  tamsulosin 0.4 milliGRAM(s) Oral at bedtime    PRN MEDICATIONS  acetaminophen   Tablet .. 650 milliGRAM(s) Oral every 6 hours PRN  ALBUTerol/ipratropium for Nebulization 3 milliLiter(s) Nebulizer every 6 hours PRN    VITALS:   Vital Signs Last 24 Hrs  T(C): 36.1 (23 Aug 2019 07:10), Max: 36.1 (23 Aug 2019 07:10)  T(F): 97 (23 Aug 2019 07:10), Max: 97 (23 Aug 2019 07:10)  HR: 66 (23 Aug 2019 07:10) (58 - 71)  BP: 123/56 (23 Aug 2019 07:10) (123/56 - 137/68)  BP(mean): 80 (23 Aug 2019 07:10) (77 - 93)  RR: 21 (23 Aug 2019 07:10) (18 - 24)  SpO2: 94% (23 Aug 2019 07:10) (94% - 98%)  CAPILLARY BLOOD GLUCOSE          LABS:                        16.6   9.08  )-----------( 129      ( 23 Aug 2019 06:23 )             58.6     08-23    144  |  99  |  67<HH>  ----------------------------<  106<H>  6.0<HH>   |  36<H>  |  1.7<H>    Ca    9.3      23 Aug 2019 06:23  Mg     2.3     08-23        RADIOLOGY:  < from: CT Chest No Cont (08.21.19 @ 12:06) >  1. Since July 7, 2017, no CT evidence of acute intrathoracic pathology.    2. Unchanged dilated main pulmonary artery, measuring 4.6 cm, suggestive   of underlying pulmonary hypertension.    < end of copied text >    PHYSICAL EXAM:  GEN: No acute distress on NC  LUNGS: Rales appreciated b/l  HEART: S1/S2 present. RRR. distant heart sounds  ABD: Soft, non-tender, non-distended. Bowel sounds present  EXT: b/l LE ulcers  NEURO: AAOX3

## 2019-08-23 NOTE — PROGRESS NOTE ADULT - SUBJECTIVE AND OBJECTIVE BOX
Patient is a 59y old  Male who presents with a chief complaint of CHF exacerbation (22 Aug 2019 21:40)      T(F): 96.8 (08-22-19 @ 23:01), Max: 96.8 (08-22-19 @ 15:06)  HR: 58 (08-23-19 @ 05:25)  BP: 123/61 (08-23-19 @ 05:25)  RR: 18 (08-23-19 @ 05:25)  SpO2: 95% (08-23-19 @ 05:25) (94% - 98%)    PHYSICAL EXAM:  GENERAL: NAD, well-groomed, well-developed  HEAD:  Atraumatic, Normocephalic  EYES: EOMI, PERRLA, conjunctiva and sclera clear  ENMT: No tonsillar erythema, exudates, or enlargement; Moist mucous membranes, Good dentition, No lesions  NECK: Supple, No JVD, Normal thyroid  NERVOUS SYSTEM:  Alert & Oriented X3,  Motor Strength 5/5 B/L upper and lower extremities  CHEST/LUNG: Clear to percussion bilaterally; No rales, rhonchi, wheezing, or rubs  HEART: Regular rate and rhythm; No murmurs, rubs, or gallops  ABDOMEN: Soft, Nontender, Nondistended; Bowel sounds present  EXTREMITIES:   No clubbing, cyanosis, Bandaged  LYMPH: No lymphadenopathy noted  SKIN: No rashes or lesions    labs  08-22    141  |  100  |  66<HH>  ----------------------------<  105<H>  5.2<H>   |  31  |  2.1<H>    Ca    8.9      22 Aug 2019 06:12  Mg     2.3     08-22                            16.1   14.90 )-----------( 127      ( 22 Aug 2019 06:12 )             57.2               acetaminophen   Tablet .. 650 milliGRAM(s) Oral every 6 hours PRN  ALBUTerol/ipratropium for Nebulization 3 milliLiter(s) Nebulizer every 6 hours PRN  cefepime   IVPB 2000 milliGRAM(s) IV Intermittent every 12 hours  chlorhexidine 4% Liquid 1 Application(s) Topical two times a day  collagenase Ointment 1 Application(s) Topical two times a day  Dakins Solution - 1/2 Strength 1 Application(s) Topical two times a day  furosemide    Tablet 60 milliGRAM(s) Oral daily  heparin  Injectable 5000 Unit(s) SubCutaneous every 8 hours  pantoprazole    Tablet 40 milliGRAM(s) Oral before breakfast  predniSONE   Tablet 40 milliGRAM(s) Oral daily  silver sulfADIAZINE 1% Cream 1 Application(s) Topical two times a day  tamsulosin 0.4 milliGRAM(s) Oral at bedtime

## 2019-08-23 NOTE — PROGRESS NOTE ADULT - SUBJECTIVE AND OBJECTIVE BOX
Nephrology progress note    Patient was seen and examined, events over the last 24 h noted .  Cr slightly improved    hemolyzed K noted    Allergies:  Allergy Status Unknown    Hospital Medications:   MEDICATIONS  (STANDING):  cefepime   IVPB 2000 milliGRAM(s) IV Intermittent every 12 hours  chlorhexidine 4% Liquid 1 Application(s) Topical two times a day  collagenase Ointment 1 Application(s) Topical two times a day  Dakins Solution - 1/2 Strength 1 Application(s) Topical two times a day  furosemide    Tablet 60 milliGRAM(s) Oral daily  heparin  Injectable 5000 Unit(s) SubCutaneous every 8 hours  pantoprazole    Tablet 40 milliGRAM(s) Oral before breakfast  predniSONE   Tablet 40 milliGRAM(s) Oral daily  silver sulfADIAZINE 1% Cream 1 Application(s) Topical two times a day  tamsulosin 0.4 milliGRAM(s) Oral at bedtime        VITALS:  T(F): 97 (19 @ 07:10), Max: 97 (19 @ 07:10)  HR: 58 (19 @ 05:25)  BP: 123/61 (19 @ 05:25)  RR: 21 (19 @ 07:10)  SpO2: 95% (19 @ 05:25)  Wt(kg): --     @ 07:  -   @ 07:00  --------------------------------------------------------  IN: 530 mL / OUT: 1475 mL / NET: -945 mL     @ 07:  -   @ 07:00  --------------------------------------------------------  IN: 970 mL / OUT: 1950 mL / NET: -980 mL          PHYSICAL EXAM:  Constitutional: NAD  HEENT: anicteric sclera, oropharynx clear, MMM  Neck: No JVD  Respiratory: CTAB, no wheezes, rales or rhonchi  Cardiovascular: S1, S2, RRR  Gastrointestinal: BS+, soft, NT/ND  Extremities: No cyanosis or clubbing. No peripheral edema  :  No broussard.   Skin: No rashes    LABS:      144  |  99  |  67<HH>  ----------------------------<  106<H>  6.0<HH>   |  36<H>  |  1.7<H>    Ca    9.3      23 Aug 2019 06:23  Mg     2.3                                 16.6   9.08  )-----------( 129      ( 23 Aug 2019 06:23 )             58.6       Urine Studies:  Urinalysis Basic - ( 19 Aug 2019 00:30 )    Color: See Note / Appearance: Cloudy / S.020 / pH:   Gluc:  / Ketone: Negative  / Bili: Negative / Urobili: 1.0 mg/dL   Blood:  / Protein: 100 mg/dL / Nitrite: Negative   Leuk Esterase: Negative / RBC: >50 /HPF / WBC 1-2 /HPF   Sq Epi:  / Non Sq Epi: Occasional /HPF / Bacteria: Few      Osmolality, Random Urine: 336 mos/kg ( @ 12:16)  Potassium, Random Urine: 36 mmol/L ( @ 12:15)  Sodium, Random Urine: 46.0 mmoL/L ( @ 12:15)  Creatinine, Random Urine: 112 mg/dL ( @ 12:15)  Chloride, Random Urine: 52 ( @ 12:15)    RADIOLOGY & ADDITIONAL STUDIES:

## 2019-08-23 NOTE — PROGRESS NOTE ADULT - SUBJECTIVE AND OBJECTIVE BOX
Patient is a 59y old  Male who presents with a chief complaint of CHF exacerbation (23 Aug 2019 07:11)      SUBJECTIVE:  Fall overnight; Denies pain; Tolerated AVAPS overnight;     REVIEW OF SYSTEMS:  See HPI    PHYSICAL EXAM  Vital Signs Last 24 Hrs  T(C): 36.1 (23 Aug 2019 07:10), Max: 36.1 (23 Aug 2019 07:10)  T(F): 97 (23 Aug 2019 07:10), Max: 97 (23 Aug 2019 07:10)  HR: 58 (23 Aug 2019 05:25) (51 - 71)  BP: 123/61 (23 Aug 2019 05:25) (99/57 - 137/68)  BP(mean): 77 (23 Aug 2019 05:25) (69 - 93)  RR: 21 (23 Aug 2019 07:10) (18 - 24)  SpO2: 95% (23 Aug 2019 05:25) (94% - 98%)  I&O's Summary    22 Aug 2019 07:01  -  23 Aug 2019 07:00  --------------------------------------------------------  IN: 970 mL / OUT: 1950 mL / NET: -980 mL    General: Comfortable in bed  HEENT:  On NC  Lymph node: No palpable LN             Lungs: RONAK overnight   Cardiovascular: RRR, S1S2  Abdomen: BS+ve; soft non tender  Extremities: Bilateral LE edema   Skin:  Venous stasis  ulcers   Neurological:  AAOx3; No focal deficit      LABS:                          16.6   9.08  )-----------( 129      ( 23 Aug 2019 06:23 )             58.6   08-23    144  |  99  |  67<HH>  ----------------------------<  106<H>  6.0<HH>   |  36<H>  |  1.7<H>    Ca    9.3      23 Aug 2019 06:23  Mg     2.3     08-23      Lactate (08-19-19 @ 05:30): 1.3  Lactate (08-18-19 @ 23:30): 1.7  Lactate (08-18-19 @ 23:10): 1.6    MEDICATIONS  (STANDING):  cefepime   IVPB 2000 milliGRAM(s) IV Intermittent every 12 hours  chlorhexidine 4% Liquid 1 Application(s) Topical two times a day  collagenase Ointment 1 Application(s) Topical two times a day  Dakins Solution - 1/2 Strength 1 Application(s) Topical two times a day  furosemide    Tablet 60 milliGRAM(s) Oral daily  heparin  Injectable 5000 Unit(s) SubCutaneous every 8 hours  pantoprazole    Tablet 40 milliGRAM(s) Oral before breakfast  predniSONE   Tablet 40 milliGRAM(s) Oral daily  silver sulfADIAZINE 1% Cream 1 Application(s) Topical two times a day  tamsulosin 0.4 milliGRAM(s) Oral at bedtime    MEDICATIONS  (PRN):  acetaminophen   Tablet .. 650 milliGRAM(s) Oral every 6 hours PRN Mild Pain (1 - 3)  ALBUTerol/ipratropium for Nebulization 3 milliLiter(s) Nebulizer every 6 hours PRN Bronchospasm      Radiology:

## 2019-08-23 NOTE — CHART NOTE - NSCHARTNOTEFT_GEN_A_CORE
59 yr old male admitted on 8/19/19 for hypercapnic Acute Resp. Failure on Chronic Resp. Failure and CHF.  Mr. Sanders's hypercapnia has not significantly been alleviated with the use of BIPAP during his hospital stay:  his PCO2 upon admission was >80 after using BIPAP PCO2 decreased to 64 but on unfortunately trended up to 71 on 8/20/19    Pt demonstrates accessory muscle use at rest and during a six minute walk, requiring repeated rest stops. Acute and Chronic Resp failure negatively effect his performance of ADL'S and his quality of life. Patient feels he is not able to engage in pulmonary rehabilitation due to severity of his dyspnea.    Patient is an ideal candidate for advanced therapy such as the Life 2000 he would benefit from this lightweight wearable volume augmentation ventilator. It may also help his hypercapnia.    The Life 2000 AVAP would help prevent readmissions and exacerbations and increase Mr. Sanders's participation in physical  and social activities.

## 2019-08-24 LAB
ANION GAP SERPL CALC-SCNC: 8 MMOL/L — SIGNIFICANT CHANGE UP (ref 7–14)
ANION GAP SERPL CALC-SCNC: 9 MMOL/L — SIGNIFICANT CHANGE UP (ref 7–14)
BUN SERPL-MCNC: 53 MG/DL — HIGH (ref 10–20)
BUN SERPL-MCNC: 54 MG/DL — HIGH (ref 10–20)
CALCIUM SERPL-MCNC: 9.3 MG/DL — SIGNIFICANT CHANGE UP (ref 8.5–10.1)
CALCIUM SERPL-MCNC: 9.9 MG/DL — SIGNIFICANT CHANGE UP (ref 8.5–10.1)
CHLORIDE SERPL-SCNC: 98 MMOL/L — SIGNIFICANT CHANGE UP (ref 98–110)
CHLORIDE SERPL-SCNC: 99 MMOL/L — SIGNIFICANT CHANGE UP (ref 98–110)
CO2 SERPL-SCNC: 37 MMOL/L — HIGH (ref 17–32)
CO2 SERPL-SCNC: 40 MMOL/L — HIGH (ref 17–32)
CREAT SERPL-MCNC: 1.3 MG/DL — SIGNIFICANT CHANGE UP (ref 0.7–1.5)
CREAT SERPL-MCNC: 1.5 MG/DL — SIGNIFICANT CHANGE UP (ref 0.7–1.5)
GLUCOSE SERPL-MCNC: 120 MG/DL — HIGH (ref 70–99)
GLUCOSE SERPL-MCNC: 161 MG/DL — HIGH (ref 70–99)
HCT VFR BLD CALC: 57.7 % — HIGH (ref 42–52)
HGB BLD-MCNC: 16.4 G/DL — SIGNIFICANT CHANGE UP (ref 14–18)
MCHC RBC-ENTMCNC: 25.5 PG — LOW (ref 27–31)
MCHC RBC-ENTMCNC: 28.4 G/DL — LOW (ref 32–37)
MCV RBC AUTO: 89.6 FL — SIGNIFICANT CHANGE UP (ref 80–94)
NRBC # BLD: 0 /100 WBCS — SIGNIFICANT CHANGE UP (ref 0–0)
PLATELET # BLD AUTO: 144 K/UL — SIGNIFICANT CHANGE UP (ref 130–400)
POTASSIUM SERPL-MCNC: 4.9 MMOL/L — SIGNIFICANT CHANGE UP (ref 3.5–5)
POTASSIUM SERPL-MCNC: 5.1 MMOL/L — HIGH (ref 3.5–5)
POTASSIUM SERPL-SCNC: 4.9 MMOL/L — SIGNIFICANT CHANGE UP (ref 3.5–5)
POTASSIUM SERPL-SCNC: 5.1 MMOL/L — HIGH (ref 3.5–5)
RBC # BLD: 6.44 M/UL — HIGH (ref 4.7–6.1)
RBC # FLD: 17.5 % — HIGH (ref 11.5–14.5)
SODIUM SERPL-SCNC: 145 MMOL/L — SIGNIFICANT CHANGE UP (ref 135–146)
SODIUM SERPL-SCNC: 146 MMOL/L — SIGNIFICANT CHANGE UP (ref 135–146)
WBC # BLD: 7.9 K/UL — SIGNIFICANT CHANGE UP (ref 4.8–10.8)
WBC # FLD AUTO: 7.9 K/UL — SIGNIFICANT CHANGE UP (ref 4.8–10.8)

## 2019-08-24 PROCEDURE — 99232 SBSQ HOSP IP/OBS MODERATE 35: CPT

## 2019-08-24 RX ORDER — AMLODIPINE BESYLATE 2.5 MG/1
5 TABLET ORAL DAILY
Refills: 0 | Status: DISCONTINUED | OUTPATIENT
Start: 2019-08-24 | End: 2019-08-25

## 2019-08-24 RX ORDER — ACETAMINOPHEN 500 MG
650 TABLET ORAL EVERY 6 HOURS
Refills: 0 | Status: DISCONTINUED | OUTPATIENT
Start: 2019-08-24 | End: 2019-08-30

## 2019-08-24 RX ADMIN — Medication 1 APPLICATION(S): at 05:33

## 2019-08-24 RX ADMIN — Medication 750 MILLIGRAM(S): at 05:33

## 2019-08-24 RX ADMIN — Medication 650 MILLIGRAM(S): at 13:06

## 2019-08-24 RX ADMIN — Medication 1 APPLICATION(S): at 18:07

## 2019-08-24 RX ADMIN — HEPARIN SODIUM 5000 UNIT(S): 5000 INJECTION INTRAVENOUS; SUBCUTANEOUS at 05:34

## 2019-08-24 RX ADMIN — Medication 650 MILLIGRAM(S): at 22:00

## 2019-08-24 RX ADMIN — Medication 650 MILLIGRAM(S): at 21:00

## 2019-08-24 RX ADMIN — Medication 750 MILLIGRAM(S): at 18:06

## 2019-08-24 RX ADMIN — Medication 650 MILLIGRAM(S): at 13:38

## 2019-08-24 RX ADMIN — HEPARIN SODIUM 5000 UNIT(S): 5000 INJECTION INTRAVENOUS; SUBCUTANEOUS at 13:08

## 2019-08-24 RX ADMIN — Medication 3 MILLILITER(S): at 07:33

## 2019-08-24 RX ADMIN — AMLODIPINE BESYLATE 5 MILLIGRAM(S): 2.5 TABLET ORAL at 22:24

## 2019-08-24 RX ADMIN — HEPARIN SODIUM 5000 UNIT(S): 5000 INJECTION INTRAVENOUS; SUBCUTANEOUS at 21:01

## 2019-08-24 RX ADMIN — CHLORHEXIDINE GLUCONATE 1 APPLICATION(S): 213 SOLUTION TOPICAL at 05:33

## 2019-08-24 RX ADMIN — TAMSULOSIN HYDROCHLORIDE 0.4 MILLIGRAM(S): 0.4 CAPSULE ORAL at 21:00

## 2019-08-24 RX ADMIN — Medication 1 APPLICATION(S): at 18:06

## 2019-08-24 RX ADMIN — Medication 60 MILLIGRAM(S): at 05:33

## 2019-08-24 RX ADMIN — Medication 1 APPLICATION(S): at 05:34

## 2019-08-24 RX ADMIN — Medication 1 APPLICATION(S): at 18:08

## 2019-08-24 RX ADMIN — PANTOPRAZOLE SODIUM 40 MILLIGRAM(S): 20 TABLET, DELAYED RELEASE ORAL at 05:33

## 2019-08-24 NOTE — PROGRESS NOTE ADULT - SUBJECTIVE AND OBJECTIVE BOX
Patient is a 59y old  Male who presents with a chief complaint of CHF exacerbation (24 Aug 2019 06:19)      HPI:  58yo Male w/ PMH of HTN, COPD (on Home O2 as needed), CHF BIBA c/o SOB started the day of admission. Associated w/ cough w/ clear sputum. Pt reports chronically having SOB and cough due to COPD. Pt also reports having fever/chills. Pt thinks he has an infection from his legs. + severe chronic venous stasis and ulcers b/l. Pt sees vascular surgeon weekly for his condition. In ED, pt was in acute distress due to dyspnea. BNP 3100s noted. CXR shows vascular congestion b/l. IV lasix 80mg given and BIPAP applied w/ great relief. No events overnight, bipap/avaps use prn.      PAST MEDICAL & SURGICAL HISTORY:  Chronic CHF  COPD, severe  Hypertension    Allergies    Allergy Status Unknown    Intolerances      FAMILY HISTORY:    Home Medications:  furosemide 40 mg oral tablet: 1 tab(s) orally once a day (19 Aug 2019 02:14)  lisinopril 40 mg oral tablet: 1 tab(s) orally once a day (19 Aug 2019 02:14)  metoprolol tartrate 25 mg oral tablet: 1 tab(s) orally 2 times a day (19 Aug 2019 02:14)  tamsulosin 0.4 mg oral capsule: 1 cap(s) orally once a day (19 Aug 2019 02:14)      ROS: as in HPI; All other systems reviewed are negative    ICU Vital Signs Last 24 Hrs  T(C): 36 (24 Aug 2019 07:19), Max: 36.5 (24 Aug 2019 07:11)  T(F): 96.8 (24 Aug 2019 07:19), Max: 97.7 (24 Aug 2019 07:11)  HR: 93 (24 Aug 2019 07:11) (58 - 93)  BP: 191/93 (24 Aug 2019 07:11) (140/65 - 191/93)  BP(mean): 127 (24 Aug 2019 07:11) (93 - 127)  ABP: --  ABP(mean): --  RR: 22 (24 Aug 2019 07:19) (16 - 22)  SpO2: --        Physical Examination:    General: No acute distress.  Alert, oriented, interactive, nonfocal    HEENT: Pupils equal, reactive to light.  Symmetric.    PULM: Clear to auscultation bilaterally, no significant sputum production    CVS: Regular rate and rhythm, no murmurs, rubs, or gallops    ABD: morbidly obese, NT, ND    EXT: chronic venous stasis changes    SKIN: b/l le chronic changes              I&O's Detail    24 Aug 2019 07:01  -  24 Aug 2019 10:35  --------------------------------------------------------  IN:    Oral Fluid: 240 mL  Total IN: 240 mL    OUT:  Total OUT: 0 mL    Total NET: 240 mL            LABS:                        16.6   9.08  )-----------( 129      ( 23 Aug 2019 06:23 )             58.6     24 Aug 2019 08:52    146    |  98     |  54     ----------------------------<  161    5.1     |  40     |  1.3      Ca    9.9        24 Aug 2019 08:52  Mg     2.3       23 Aug 2019 06:23            CAPILLARY BLOOD GLUCOSE            Culture    Culture - Blood (collected 22 Aug 2019 06:12)  Source: .Blood None  Preliminary Report (23 Aug 2019 17:01):    No growth to date.    Culture - Blood (collected 22 Aug 2019 06:12)  Source: .Blood None  Preliminary Report (23 Aug 2019 17:01):    No growth to date.          MEDICATIONS  (STANDING):  chlorhexidine 4% Liquid 1 Application(s) Topical two times a day  ciprofloxacin     Tablet 750 milliGRAM(s) Oral two times a day  collagenase Ointment 1 Application(s) Topical two times a day  Dakins Solution - 1/2 Strength 1 Application(s) Topical two times a day  furosemide    Tablet 60 milliGRAM(s) Oral daily  heparin  Injectable 5000 Unit(s) SubCutaneous every 8 hours  pantoprazole    Tablet 40 milliGRAM(s) Oral before breakfast  silver sulfADIAZINE 1% Cream 1 Application(s) Topical two times a day  tamsulosin 0.4 milliGRAM(s) Oral at bedtime    MEDICATIONS  (PRN):  acetaminophen   Tablet .. 650 milliGRAM(s) Oral every 6 hours PRN Mild Pain (1 - 3)  ALBUTerol/ipratropium for Nebulization 3 milliLiter(s) Nebulizer every 6 hours PRN Bronchospasm

## 2019-08-24 NOTE — PROGRESS NOTE ADULT - ASSESSMENT
RX priscilla. Out greater than in . Check lytes  , Echo mod lvh EF 55%. Continue lasix. AVAP at home . Try ambulate. Prognosis guarded

## 2019-08-24 NOTE — PROGRESS NOTE ADULT - ASSESSMENT
58yo Male w/ PMH of HTN, COPD (on Home O2 as needed), CHF BIBA c/o SOB, improved w/ AVAPS, lasix bronchodilators.  Pt w/ pseudomonas stutzeri sepsis, source is likely his legs.  switched to PO ciprofloxacin, doing well.    #) Acute Hypoxic resp failure 2/2 Acute on chronic diastolic heart failure  - admitted to telemetry  - Lasix PO 60mg daily  - Euvolemic at this time  - Monitor I & O, Cr, and daily weight    #) Acute hypercapnic respiratory failure secondary to COPD exacerbation with chronic CO2 retention 2/2 OHS and COPD  - Respiratory acidosis on presentation  - Improved on AVAPS, cont 4 on 4 off and QHS  - Keep O2 88-92%  - Prednisone 40mg daily 5/5 days  - Neb q4hr and PRN  - Will need AVAPS on discharge, settings will be assessed by pulmonary prior to d/c    #) Sepsis secondary to Pseudomonas stutzeri bacteremia and LE cellulitis  - T max 102 with leukocytosis and normal lactate on presentation  - Afebrile now  - UA neg  - Blood culture shows Pseudomonas stutzeri. Pan sensitive  - continue Cipro 750mg q12 (5/10 days)  - F/U repeat Blood culture  #) OLIVE on CKD Improving  -improving craet now 1.3  - Hold lisinopril  - FeUrea 13% likely prerenal  - Nephrology consulted and recommended to decrease lasix

## 2019-08-24 NOTE — PROGRESS NOTE ADULT - SUBJECTIVE AND OBJECTIVE BOX
Patient is a 59y old  Male who presents with a chief complaint of CHF exacerbation (23 Aug 2019 20:35)      T(F): 96.5 (08-23-19 @ 23:01), Max: 97 (08-23-19 @ 07:10)  HR: 58 (08-24-19 @ 01:26)  BP: 140/65 (08-24-19 @ 00:46)  RR: 19 (08-24-19 @ 00:46)  SpO2: 94% (08-23-19 @ 07:10) (94% - 94%)    PHYSICAL EXAM:  GENERAL: NAD, well-groomed, well-developed  HEAD:  Atraumatic, Normocephalic  EYES: EOMI, PERRLA, conjunctiva and sclera clear  ENMT: No tonsillar erythema, exudates, or enlargement; Moist mucous membranes, Good dentition, No lesions  NECK: Supple, No JVD, Normal thyroid  NERVOUS SYSTEM:  Alert & Oriented X3,  Motor Strength 5/5 B/L upper and lower extremities  CHEST/LUNG: Clear to percussion bilaterally; No rales, rhonchi, wheezing, or rubs  HEART: Regular rate and rhythm; No murmurs, rubs, or gallops  ABDOMEN: Soft, Nontender, Nondistended; Bowel sounds present  EXTREMITIES:   No clubbing, cyanosis, 1+  LYMPH: No lymphadenopathy noted  SKIN: No rashes or lesions    labs  08-23    144  |  100  |  64<HH>  ----------------------------<  153<H>  5.8<H>   |  37<H>  |  1.6<H>    Ca    9.3      23 Aug 2019 15:42  Mg     2.3     08-23                            16.6   9.08  )-----------( 129      ( 23 Aug 2019 06:23 )             58.6       Culture - Blood (collected 22 Aug 2019 06:12)  Source: .Blood None  Preliminary Report (23 Aug 2019 17:01):    No growth to date.    Culture - Blood (collected 22 Aug 2019 06:12)  Source: .Blood None  Preliminary Report (23 Aug 2019 17:01):    No growth to date.              acetaminophen   Tablet .. 650 milliGRAM(s) Oral every 6 hours PRN  ALBUTerol/ipratropium for Nebulization 3 milliLiter(s) Nebulizer every 8 hours  ALBUTerol/ipratropium for Nebulization 3 milliLiter(s) Nebulizer every 6 hours PRN  chlorhexidine 4% Liquid 1 Application(s) Topical two times a day  ciprofloxacin     Tablet 750 milliGRAM(s) Oral two times a day  collagenase Ointment 1 Application(s) Topical two times a day  Dakins Solution - 1/2 Strength 1 Application(s) Topical two times a day  furosemide    Tablet 60 milliGRAM(s) Oral daily  heparin  Injectable 5000 Unit(s) SubCutaneous every 8 hours  pantoprazole    Tablet 40 milliGRAM(s) Oral before breakfast  silver sulfADIAZINE 1% Cream 1 Application(s) Topical two times a day  tamsulosin 0.4 milliGRAM(s) Oral at bedtime

## 2019-08-24 NOTE — PROGRESS NOTE ADULT - SUBJECTIVE AND OBJECTIVE BOX
60yo Male w/ PMH of HTN, COPD (on Home O2 as needed), CHF BIBA c/o SOB that started day prior to admission . Associated w/ cough w/ clear sputum. Pt reports chronically having SOB and cough due to COPD, but reports new onset of more severe SOB  Pt also reports having fever/chills. Pt thinks he has an infection from his legs. + severe chronic venous stasis and ulcers b/l. Pt sees vascular surgeon weekly for his condition. In ED, pt was in acute distress due to dyspnea. BNP 3100s noted. CXR shows vascular congestion b/l. IV lasix 80mg given and BIPAP applied w/ great relief. Pt was very hypertensive w/ SBP > 200, nitro drip started. Pt was febrile w/ Tmax of 103.4. Pt denies any CP, palpitation, abd pain, N/V/D, dysuria, calf pain, or dizziness/lightheadedness. now in CCU - sepesis gram -     interval - noted seen by renal; reviewed chart ; repeat cultures      PAST MEDICAL & SURGICAL HISTORY:  Chronic CHF  COPD, severe  Hypertension      MEDICATIONS  (STANDING):  chlorhexidine 4% Liquid 1 Application(s) Topical two times a day  ciprofloxacin     Tablet 750 milliGRAM(s) Oral two times a day  collagenase Ointment 1 Application(s) Topical two times a day  Dakins Solution - 1/2 Strength 1 Application(s) Topical two times a day  furosemide    Tablet 60 milliGRAM(s) Oral daily  heparin  Injectable 5000 Unit(s) SubCutaneous every 8 hours  pantoprazole    Tablet 40 milliGRAM(s) Oral before breakfast  silver sulfADIAZINE 1% Cream 1 Application(s) Topical two times a day  tamsulosin 0.4 milliGRAM(s) Oral at bedtime    MEDICATIONS  (PRN):  acetaminophen   Tablet .. 650 milliGRAM(s) Oral every 6 hours PRN Mild Pain (1 - 3)  ALBUTerol/ipratropium for Nebulization 3 milliLiter(s) Nebulizer every 6 hours PRN Bronchospasm      ICU Vital Signs Last 24 Hrs  T(C): 36 (24 Aug 2019 07:19), Max: 36.5 (24 Aug 2019 07:11)  T(F): 96.8 (24 Aug 2019 07:19), Max: 97.7 (24 Aug 2019 07:11)  HR: 93 (24 Aug 2019 07:11) (58 - 93)  BP: 191/93 (24 Aug 2019 07:11) (140/65 - 191/93)  BP(mean): 127 (24 Aug 2019 07:11) (93 - 127)  ABP: --  ABP(mean): --  RR: 22 (24 Aug 2019 07:19) (16 - 22)  SpO2: --      GENERAL :  up and alert sitting in chair   HEENT: PERRLA EOMI  LUNGS: decr bs   CARD: s1 s2 rrr  abd: obese   ext : wrapped   neuro - Alert Awake Ox 3                                16.6   9.08  )-----------( 129      ( 23 Aug 2019 06:23 )             58.6     08-24    146  |  98  |  54<H>  ----------------------------<  161<H>  5.1<H>   |  40<H>  |  1.3    Ca    9.9      24 Aug 2019 08:52  Mg     2.3     08-23                 Culture - Blood (08.22.19 @ 06:12)    Specimen Source: .Blood None    Culture Results:   No growth to date.    Culture - Blood (08.22.19 @ 06:12)    Specimen Source: .Blood None    Culture Results:   No growth to date.

## 2019-08-25 DIAGNOSIS — I50.9 HEART FAILURE, UNSPECIFIED: ICD-10-CM

## 2019-08-25 DIAGNOSIS — J44.9 CHRONIC OBSTRUCTIVE PULMONARY DISEASE, UNSPECIFIED: ICD-10-CM

## 2019-08-25 LAB
ANION GAP SERPL CALC-SCNC: 6 MMOL/L — LOW (ref 7–14)
BASOPHILS # BLD AUTO: 0.02 K/UL — SIGNIFICANT CHANGE UP (ref 0–0.2)
BASOPHILS NFR BLD AUTO: 0.3 % — SIGNIFICANT CHANGE UP (ref 0–1)
BUN SERPL-MCNC: 44 MG/DL — HIGH (ref 10–20)
CALCIUM SERPL-MCNC: 9.2 MG/DL — SIGNIFICANT CHANGE UP (ref 8.5–10.1)
CHLORIDE SERPL-SCNC: 99 MMOL/L — SIGNIFICANT CHANGE UP (ref 98–110)
CO2 SERPL-SCNC: 39 MMOL/L — HIGH (ref 17–32)
CREAT SERPL-MCNC: 1.3 MG/DL — SIGNIFICANT CHANGE UP (ref 0.7–1.5)
EOSINOPHIL # BLD AUTO: 0.12 K/UL — SIGNIFICANT CHANGE UP (ref 0–0.7)
EOSINOPHIL NFR BLD AUTO: 1.6 % — SIGNIFICANT CHANGE UP (ref 0–8)
GLUCOSE SERPL-MCNC: 111 MG/DL — HIGH (ref 70–99)
HCT VFR BLD CALC: 55.5 % — HIGH (ref 42–52)
HGB BLD-MCNC: 15.8 G/DL — SIGNIFICANT CHANGE UP (ref 14–18)
IMM GRANULOCYTES NFR BLD AUTO: 0.3 % — SIGNIFICANT CHANGE UP (ref 0.1–0.3)
LYMPHOCYTES # BLD AUTO: 0.54 K/UL — LOW (ref 1.2–3.4)
LYMPHOCYTES # BLD AUTO: 7.3 % — LOW (ref 20.5–51.1)
MAGNESIUM SERPL-MCNC: 1.7 MG/DL — LOW (ref 1.8–2.4)
MCHC RBC-ENTMCNC: 25.3 PG — LOW (ref 27–31)
MCHC RBC-ENTMCNC: 28.5 G/DL — LOW (ref 32–37)
MCV RBC AUTO: 88.8 FL — SIGNIFICANT CHANGE UP (ref 80–94)
MONOCYTES # BLD AUTO: 0.81 K/UL — HIGH (ref 0.1–0.6)
MONOCYTES NFR BLD AUTO: 11 % — HIGH (ref 1.7–9.3)
NEUTROPHILS # BLD AUTO: 5.84 K/UL — SIGNIFICANT CHANGE UP (ref 1.4–6.5)
NEUTROPHILS NFR BLD AUTO: 79.5 % — HIGH (ref 42.2–75.2)
NRBC # BLD: 0 /100 WBCS — SIGNIFICANT CHANGE UP (ref 0–0)
PHOSPHATE SERPL-MCNC: 2.9 MG/DL — SIGNIFICANT CHANGE UP (ref 2.1–4.9)
PLATELET # BLD AUTO: 113 K/UL — LOW (ref 130–400)
POTASSIUM SERPL-MCNC: 4.9 MMOL/L — SIGNIFICANT CHANGE UP (ref 3.5–5)
POTASSIUM SERPL-SCNC: 4.9 MMOL/L — SIGNIFICANT CHANGE UP (ref 3.5–5)
RBC # BLD: 6.25 M/UL — HIGH (ref 4.7–6.1)
RBC # FLD: 17.4 % — HIGH (ref 11.5–14.5)
SODIUM SERPL-SCNC: 144 MMOL/L — SIGNIFICANT CHANGE UP (ref 135–146)
WBC # BLD: 7.35 K/UL — SIGNIFICANT CHANGE UP (ref 4.8–10.8)
WBC # FLD AUTO: 7.35 K/UL — SIGNIFICANT CHANGE UP (ref 4.8–10.8)

## 2019-08-25 PROCEDURE — 99231 SBSQ HOSP IP/OBS SF/LOW 25: CPT

## 2019-08-25 RX ORDER — AMLODIPINE BESYLATE 2.5 MG/1
5 TABLET ORAL ONCE
Refills: 0 | Status: COMPLETED | OUTPATIENT
Start: 2019-08-25 | End: 2019-08-25

## 2019-08-25 RX ORDER — MAGNESIUM SULFATE 500 MG/ML
2 VIAL (ML) INJECTION ONCE
Refills: 0 | Status: COMPLETED | OUTPATIENT
Start: 2019-08-25 | End: 2019-08-25

## 2019-08-25 RX ORDER — DOCUSATE SODIUM 100 MG
100 CAPSULE ORAL THREE TIMES A DAY
Refills: 0 | Status: DISCONTINUED | OUTPATIENT
Start: 2019-08-25 | End: 2019-08-30

## 2019-08-25 RX ORDER — AMLODIPINE BESYLATE 2.5 MG/1
10 TABLET ORAL DAILY
Refills: 0 | Status: DISCONTINUED | OUTPATIENT
Start: 2019-08-26 | End: 2019-08-30

## 2019-08-25 RX ADMIN — PANTOPRAZOLE SODIUM 40 MILLIGRAM(S): 20 TABLET, DELAYED RELEASE ORAL at 06:05

## 2019-08-25 RX ADMIN — Medication 1 APPLICATION(S): at 06:07

## 2019-08-25 RX ADMIN — Medication 650 MILLIGRAM(S): at 04:39

## 2019-08-25 RX ADMIN — Medication 1 APPLICATION(S): at 17:41

## 2019-08-25 RX ADMIN — Medication 1 APPLICATION(S): at 06:06

## 2019-08-25 RX ADMIN — TAMSULOSIN HYDROCHLORIDE 0.4 MILLIGRAM(S): 0.4 CAPSULE ORAL at 21:36

## 2019-08-25 RX ADMIN — Medication 100 MILLIGRAM(S): at 21:36

## 2019-08-25 RX ADMIN — Medication 1 APPLICATION(S): at 17:40

## 2019-08-25 RX ADMIN — Medication 650 MILLIGRAM(S): at 22:03

## 2019-08-25 RX ADMIN — HEPARIN SODIUM 5000 UNIT(S): 5000 INJECTION INTRAVENOUS; SUBCUTANEOUS at 15:07

## 2019-08-25 RX ADMIN — Medication 650 MILLIGRAM(S): at 05:23

## 2019-08-25 RX ADMIN — Medication 60 MILLIGRAM(S): at 06:05

## 2019-08-25 RX ADMIN — AMLODIPINE BESYLATE 5 MILLIGRAM(S): 2.5 TABLET ORAL at 10:20

## 2019-08-25 RX ADMIN — Medication 650 MILLIGRAM(S): at 23:00

## 2019-08-25 RX ADMIN — HEPARIN SODIUM 5000 UNIT(S): 5000 INJECTION INTRAVENOUS; SUBCUTANEOUS at 21:36

## 2019-08-25 RX ADMIN — Medication 750 MILLIGRAM(S): at 17:38

## 2019-08-25 RX ADMIN — Medication 750 MILLIGRAM(S): at 06:05

## 2019-08-25 RX ADMIN — Medication 25 GRAM(S): at 09:07

## 2019-08-25 RX ADMIN — CHLORHEXIDINE GLUCONATE 1 APPLICATION(S): 213 SOLUTION TOPICAL at 09:08

## 2019-08-25 RX ADMIN — Medication 650 MILLIGRAM(S): at 16:56

## 2019-08-25 RX ADMIN — HEPARIN SODIUM 5000 UNIT(S): 5000 INJECTION INTRAVENOUS; SUBCUTANEOUS at 06:05

## 2019-08-25 NOTE — PHYSICAL THERAPY INITIAL EVALUATION ADULT - IMPAIRMENTS CONTRIBUTING IMPAIRED BED MOBILITY, REHAB EVAL
impaired postural control/decreased endurance/pain/decreased strength/decreased ROM/impaired balance

## 2019-08-25 NOTE — PHYSICAL THERAPY INITIAL EVALUATION ADULT - IMPAIRED TRANSFERS: SIT/STAND, REHAB EVAL
decreased ROM/impaired balance/decreased endurance/pain/impaired postural control/decreased strength

## 2019-08-25 NOTE — PROGRESS NOTE ADULT - PROBLEM SELECTOR PLAN 2
chronic issue   wt loss   Vascular follow up - out pt
off bipap/ b dilator/ atrovent/ pul f/u
silvadene dressing   weight loss  UNABOOT out pt

## 2019-08-25 NOTE — PROGRESS NOTE ADULT - SUBJECTIVE AND OBJECTIVE BOX
58yo Male w/ PMH of HTN, COPD (on Home O2 as needed), CHF BIBA c/o SOB that started day prior to admission . Associated w/ cough w/ clear sputum. Pt reports chronically having SOB and cough due to COPD, but reports new onset of more severe SOB  Pt also reports having fever/chills. Pt thinks he has an infection from his legs. + severe chronic venous stasis and ulcers b/l. Pt sees vascular surgeon weekly for his condition. In ED, pt was in acute distress due to dyspnea. BNP 3100s noted. CXR shows vascular congestion b/l. IV lasix 80mg given and BIPAP applied w/ great relief. Pt was very hypertensive w/ SBP > 200, nitro drip started. Pt was febrile w/ Tmax of 103.4. Pt denies any CP, palpitation, abd pain, N/V/D, dysuria, calf pain, or dizziness/lightheadedness. now in CCU - sepesis gram -     interval - noted seen by renal; reviewed chart ; repeat cultures      PAST MEDICAL & SURGICAL HISTORY:  Chronic CHF  COPD, severe  Hypertension      MEDICATIONS  (STANDING):  chlorhexidine 4% Liquid 1 Application(s) Topical two times a day  ciprofloxacin     Tablet 750 milliGRAM(s) Oral two times a day  collagenase Ointment 1 Application(s) Topical two times a day  Dakins Solution - 1/2 Strength 1 Application(s) Topical two times a day  furosemide    Tablet 60 milliGRAM(s) Oral daily  heparin  Injectable 5000 Unit(s) SubCutaneous every 8 hours  pantoprazole    Tablet 40 milliGRAM(s) Oral before breakfast  silver sulfADIAZINE 1% Cream 1 Application(s) Topical two times a day  tamsulosin 0.4 milliGRAM(s) Oral at bedtime    MEDICATIONS  (PRN):  acetaminophen   Tablet .. 650 milliGRAM(s) Oral every 6 hours PRN Mild Pain (1 - 3)  ALBUTerol/ipratropium for Nebulization 3 milliLiter(s) Nebulizer every 6 hours PRN Bronchospasm        ICU Vital Signs Last 24 Hrs  T(C): 36 (24 Aug 2019 07:19), Max: 36.5 (24 Aug 2019 07:11)  T(F): 96.8 (24 Aug 2019 07:19), Max: 97.7 (24 Aug 2019 07:11)  HR: 93 (24 Aug 2019 07:11) (58 - 93)  BP: 191/93 (24 Aug 2019 07:11) (140/65 - 191/93)  BP(mean): 127 (24 Aug 2019 07:11) (93 - 127)  ABP: --  ABP(mean): --  RR: 22 (24 Aug 2019 07:19) (16 - 22)  SpO2: --      GENERAL :  up and alert sitting in chair   HEENT: PERRLA EOMI  LUNGS: decr bs   CARD: s1 s2 rrr  abd: obese   ext : wrapped   neuro - Alert Awake Ox 3                                15.8   7.35  )-----------( 113      ( 25 Aug 2019 05:29 )             55.5   08-25    144  |  99  |  44<H>  ----------------------------<  111<H>  4.9   |  39<H>  |  1.3    Ca    9.2      25 Aug 2019 05:29  Phos  2.9     08-25  Mg     1.7     08-25    Culture - Blood (08.22.19 @ 06:12)    Specimen Source: .Blood None    Culture Results:   No growth to date.    Culture - Blood (08.22.19 @ 06:12)    Specimen Source: .Blood None    Culture Results:   No growth to date.    MRSA/MSSA PCR (08.20.19 @ 08:51)    MRSA PCR Result.: Negative: CATE Lyn  By: Real-Time PCR (Polymerase Reaction Method)

## 2019-08-25 NOTE — PROGRESS NOTE ADULT - SUBJECTIVE AND OBJECTIVE BOX
Patient is a 59y old  Male who presents with a chief complaint of CHF exacerbation (25 Aug 2019 13:57)      T(F): 98.6 (08-25-19 @ 15:00), Max: 98.6 (08-25-19 @ 15:00)  HR: 68 (08-25-19 @ 20:00)  BP: 149/66 (08-25-19 @ 15:16)  RR: 17 (08-25-19 @ 20:00)  SpO2: 98% (08-25-19 @ 20:00) (95% - 99%)    PHYSICAL EXAM:  GENERAL: NAD, well-groomed, well-developed  HEAD:  Atraumatic, Normocephalic  EYES: EOMI, PERRLA, conjunctiva and sclera clear  ENMT: No tonsillar erythema, exudates, or enlargement; Moist mucous membranes, Good dentition, No lesions  NECK: Supple, No JVD, Normal thyroid  NERVOUS SYSTEM:  Alert & Oriented X3, Good concentration; Motor Strength 5/5 B/L upper and lower extremities; DTRs 2+ intact and symmetric  CHEST/LUNG: Clear to percussion bilaterally; No rales, rhonchi, wheezing, or rubs  HEART: Regular rate and rhythm; No murmurs, rubs, or gallops  ABDOMEN: Soft, Nontender, Nondistended; Bowel sounds present  EXTREMITIES:  2+ Peripheral Pulses, No clubbing, cyanosis, or edema  LYMPH: No lymphadenopathy noted  SKIN: No rashes or lesions    labs  08-25    144  |  99  |  44<H>  ----------------------------<  111<H>  4.9   |  39<H>  |  1.3    Ca    9.2      25 Aug 2019 05:29  Phos  2.9     08-25  Mg     1.7     08-25                            15.8   7.35  )-----------( 113      ( 25 Aug 2019 05:29 )             55.5               radiology    acetaminophen   Tablet .. 650 milliGRAM(s) Oral every 6 hours PRN  ALBUTerol/ipratropium for Nebulization 3 milliLiter(s) Nebulizer every 6 hours PRN  chlorhexidine 4% Liquid 1 Application(s) Topical two times a day  ciprofloxacin     Tablet 750 milliGRAM(s) Oral two times a day  collagenase Ointment 1 Application(s) Topical two times a day  Dakins Solution - 1/2 Strength 1 Application(s) Topical two times a day  docusate sodium 100 milliGRAM(s) Oral three times a day  furosemide    Tablet 60 milliGRAM(s) Oral daily  heparin  Injectable 5000 Unit(s) SubCutaneous every 8 hours  pantoprazole    Tablet 40 milliGRAM(s) Oral before breakfast  silver sulfADIAZINE 1% Cream 1 Application(s) Topical two times a day  tamsulosin 0.4 milliGRAM(s) Oral at bedtime

## 2019-08-25 NOTE — PHYSICAL THERAPY INITIAL EVALUATION ADULT - IMPAIRMENTS CONTRIBUTING TO GAIT DEVIATIONS, PT EVAL
pain/decreased strength/decreased ROM/decreased endurance/impaired postural control/impaired balance

## 2019-08-25 NOTE — PHYSICAL THERAPY INITIAL EVALUATION ADULT - GENERAL OBSERVATIONS, REHAB EVAL
10:35-11:00. Chart reviewed; confirmed with RN to see the pt for PT. Pt ready for PT with complain of pain in R knee. +tele, +pulse ox, +O2 via NC,+ redness/swelling both LE's.  Agreeable for PT evaluation.

## 2019-08-25 NOTE — PROGRESS NOTE ADULT - PROBLEM SELECTOR PLAN 1
acute illness  possible sources-   1. urine - dirty looking Urine despite neg UA. Prior UTI / Prostate issues  2. LE venous stasis - however usually gram positives- andrés strep    can switch to PO Cipro 750 mg Q12   complete at least 10 days total abx ( completed 3 days to date)  follow up with Urology out pt   recall if needed
acute illness  GNR in blood   ? urine - neg UA but dirty looking urine   ? lungs - cough and SOB but COPD / CHF   ?? skin - far less common - usually strep       Dc Vanco - doubt MRSA of LE   Taper abx with Cx results   at least 10 days of abx - ? IV to PO
diuretcs/ cardiio f/u/ bnp/ troponin

## 2019-08-25 NOTE — PHYSICAL THERAPY INITIAL EVALUATION ADULT - GAIT DEVIATIONS NOTED, PT EVAL
increased time in double stance/decreased weight-shifting ability/decreased sharon/decreased step length

## 2019-08-26 PROBLEM — I50.9 HEART FAILURE, UNSPECIFIED: Chronic | Status: ACTIVE | Noted: 2019-08-19

## 2019-08-26 PROBLEM — I10 ESSENTIAL (PRIMARY) HYPERTENSION: Chronic | Status: ACTIVE | Noted: 2019-08-19

## 2019-08-26 PROBLEM — J44.9 CHRONIC OBSTRUCTIVE PULMONARY DISEASE, UNSPECIFIED: Chronic | Status: ACTIVE | Noted: 2019-08-19

## 2019-08-26 LAB
ALBUMIN SERPL ELPH-MCNC: 3.2 G/DL — LOW (ref 3.5–5.2)
ALP SERPL-CCNC: 72 U/L — SIGNIFICANT CHANGE UP (ref 30–115)
ALT FLD-CCNC: 13 U/L — SIGNIFICANT CHANGE UP (ref 0–41)
ANION GAP SERPL CALC-SCNC: 7 MMOL/L — SIGNIFICANT CHANGE UP (ref 7–14)
AST SERPL-CCNC: 15 U/L — SIGNIFICANT CHANGE UP (ref 0–41)
BASOPHILS # BLD AUTO: 0.02 K/UL — SIGNIFICANT CHANGE UP (ref 0–0.2)
BASOPHILS NFR BLD AUTO: 0.3 % — SIGNIFICANT CHANGE UP (ref 0–1)
BILIRUB SERPL-MCNC: 1.1 MG/DL — SIGNIFICANT CHANGE UP (ref 0.2–1.2)
BUN SERPL-MCNC: 33 MG/DL — HIGH (ref 10–20)
BUN SERPL-MCNC: 34 MG/DL — HIGH (ref 10–20)
CALCIUM SERPL-MCNC: 9.3 MG/DL — SIGNIFICANT CHANGE UP (ref 8.5–10.1)
CALCIUM SERPL-MCNC: 9.6 MG/DL — SIGNIFICANT CHANGE UP (ref 8.5–10.1)
CHLORIDE SERPL-SCNC: 94 MMOL/L — LOW (ref 98–110)
CHLORIDE SERPL-SCNC: 97 MMOL/L — LOW (ref 98–110)
CO2 SERPL-SCNC: 41 MMOL/L — CRITICAL HIGH (ref 17–32)
CO2 SERPL-SCNC: 43 MMOL/L — CRITICAL HIGH (ref 17–32)
CREAT SERPL-MCNC: 1.2 MG/DL — SIGNIFICANT CHANGE UP (ref 0.7–1.5)
CREAT SERPL-MCNC: 1.2 MG/DL — SIGNIFICANT CHANGE UP (ref 0.7–1.5)
EOSINOPHIL # BLD AUTO: 0.14 K/UL — SIGNIFICANT CHANGE UP (ref 0–0.7)
EOSINOPHIL NFR BLD AUTO: 1.9 % — SIGNIFICANT CHANGE UP (ref 0–8)
GLUCOSE BLDC GLUCOMTR-MCNC: 119 MG/DL — HIGH (ref 70–99)
GLUCOSE BLDC GLUCOMTR-MCNC: 93 MG/DL — SIGNIFICANT CHANGE UP (ref 70–99)
GLUCOSE BLDC GLUCOMTR-MCNC: 99 MG/DL — SIGNIFICANT CHANGE UP (ref 70–99)
GLUCOSE SERPL-MCNC: 104 MG/DL — HIGH (ref 70–99)
GLUCOSE SERPL-MCNC: 111 MG/DL — HIGH (ref 70–99)
HCT VFR BLD CALC: 55 % — HIGH (ref 42–52)
HGB BLD-MCNC: 15.9 G/DL — SIGNIFICANT CHANGE UP (ref 14–18)
IMM GRANULOCYTES NFR BLD AUTO: 0.7 % — HIGH (ref 0.1–0.3)
LYMPHOCYTES # BLD AUTO: 0.61 K/UL — LOW (ref 1.2–3.4)
LYMPHOCYTES # BLD AUTO: 8.2 % — LOW (ref 20.5–51.1)
MAGNESIUM SERPL-MCNC: 1.9 MG/DL — SIGNIFICANT CHANGE UP (ref 1.8–2.4)
MCHC RBC-ENTMCNC: 25.7 PG — LOW (ref 27–31)
MCHC RBC-ENTMCNC: 28.9 G/DL — LOW (ref 32–37)
MCV RBC AUTO: 89 FL — SIGNIFICANT CHANGE UP (ref 80–94)
MONOCYTES # BLD AUTO: 0.87 K/UL — HIGH (ref 0.1–0.6)
MONOCYTES NFR BLD AUTO: 11.7 % — HIGH (ref 1.7–9.3)
NEUTROPHILS # BLD AUTO: 5.77 K/UL — SIGNIFICANT CHANGE UP (ref 1.4–6.5)
NEUTROPHILS NFR BLD AUTO: 77.2 % — HIGH (ref 42.2–75.2)
NRBC # BLD: 0 /100 WBCS — SIGNIFICANT CHANGE UP (ref 0–0)
PLATELET # BLD AUTO: 122 K/UL — LOW (ref 130–400)
POTASSIUM SERPL-MCNC: 5.3 MMOL/L — HIGH (ref 3.5–5)
POTASSIUM SERPL-MCNC: 5.6 MMOL/L — HIGH (ref 3.5–5)
POTASSIUM SERPL-SCNC: 5.3 MMOL/L — HIGH (ref 3.5–5)
POTASSIUM SERPL-SCNC: 5.6 MMOL/L — HIGH (ref 3.5–5)
PROT SERPL-MCNC: 7 G/DL — SIGNIFICANT CHANGE UP (ref 6–8)
RBC # BLD: 6.18 M/UL — HIGH (ref 4.7–6.1)
RBC # FLD: 17.2 % — HIGH (ref 11.5–14.5)
SODIUM SERPL-SCNC: 144 MMOL/L — SIGNIFICANT CHANGE UP (ref 135–146)
SODIUM SERPL-SCNC: 145 MMOL/L — SIGNIFICANT CHANGE UP (ref 135–146)
WBC # BLD: 7.46 K/UL — SIGNIFICANT CHANGE UP (ref 4.8–10.8)
WBC # FLD AUTO: 7.46 K/UL — SIGNIFICANT CHANGE UP (ref 4.8–10.8)

## 2019-08-26 RX ORDER — DEXTROSE 50 % IN WATER 50 %
50 SYRINGE (ML) INTRAVENOUS ONCE
Refills: 0 | Status: COMPLETED | OUTPATIENT
Start: 2019-08-26 | End: 2019-08-26

## 2019-08-26 RX ORDER — INSULIN HUMAN 100 [IU]/ML
10 INJECTION, SOLUTION SUBCUTANEOUS ONCE
Refills: 0 | Status: COMPLETED | OUTPATIENT
Start: 2019-08-26 | End: 2019-08-26

## 2019-08-26 RX ADMIN — CHLORHEXIDINE GLUCONATE 1 APPLICATION(S): 213 SOLUTION TOPICAL at 18:20

## 2019-08-26 RX ADMIN — Medication 1 APPLICATION(S): at 05:32

## 2019-08-26 RX ADMIN — Medication 1 APPLICATION(S): at 05:53

## 2019-08-26 RX ADMIN — Medication 650 MILLIGRAM(S): at 06:25

## 2019-08-26 RX ADMIN — PANTOPRAZOLE SODIUM 40 MILLIGRAM(S): 20 TABLET, DELAYED RELEASE ORAL at 06:25

## 2019-08-26 RX ADMIN — TAMSULOSIN HYDROCHLORIDE 0.4 MILLIGRAM(S): 0.4 CAPSULE ORAL at 22:06

## 2019-08-26 RX ADMIN — Medication 750 MILLIGRAM(S): at 18:20

## 2019-08-26 RX ADMIN — AMLODIPINE BESYLATE 10 MILLIGRAM(S): 2.5 TABLET ORAL at 05:27

## 2019-08-26 RX ADMIN — Medication 60 MILLIGRAM(S): at 05:27

## 2019-08-26 RX ADMIN — Medication 1 APPLICATION(S): at 18:20

## 2019-08-26 RX ADMIN — Medication 750 MILLIGRAM(S): at 05:27

## 2019-08-26 RX ADMIN — HEPARIN SODIUM 5000 UNIT(S): 5000 INJECTION INTRAVENOUS; SUBCUTANEOUS at 05:27

## 2019-08-26 RX ADMIN — Medication 50 MILLILITER(S): at 11:52

## 2019-08-26 RX ADMIN — Medication 100 MILLIGRAM(S): at 13:04

## 2019-08-26 RX ADMIN — Medication 650 MILLIGRAM(S): at 22:34

## 2019-08-26 RX ADMIN — Medication 100 MILLIGRAM(S): at 22:05

## 2019-08-26 RX ADMIN — Medication 650 MILLIGRAM(S): at 05:27

## 2019-08-26 RX ADMIN — Medication 100 MILLIGRAM(S): at 05:27

## 2019-08-26 RX ADMIN — HEPARIN SODIUM 5000 UNIT(S): 5000 INJECTION INTRAVENOUS; SUBCUTANEOUS at 22:06

## 2019-08-26 RX ADMIN — HEPARIN SODIUM 5000 UNIT(S): 5000 INJECTION INTRAVENOUS; SUBCUTANEOUS at 13:04

## 2019-08-26 RX ADMIN — CHLORHEXIDINE GLUCONATE 1 APPLICATION(S): 213 SOLUTION TOPICAL at 11:43

## 2019-08-26 RX ADMIN — INSULIN HUMAN 10 UNIT(S): 100 INJECTION, SOLUTION SUBCUTANEOUS at 11:42

## 2019-08-26 NOTE — PROGRESS NOTE ADULT - SUBJECTIVE AND OBJECTIVE BOX
58yo Male w/ PMH of HTN, COPD (on Home O2 as needed), CHF BIBA c/o SOB that started day prior to admission . Associated w/ cough w/ clear sputum. Pt reports chronically having SOB and cough due to COPD, but reports new onset of more severe SOB  Pt also reports having fever/chills. Pt thinks he has an infection from his legs. + severe chronic venous stasis and ulcers b/l. Pt sees vascular surgeon weekly for his condition. In ED, pt was in acute distress due to dyspnea. BNP 3100s noted. CXR shows vascular congestion b/l. IV lasix 80mg given and BIPAP applied w/ great relief. Pt was very hypertensive w/ SBP > 200, nitro drip started. Pt was febrile w/ Tmax of 103.4. Pt denies any CP, palpitation, abd pain, N/V/D, dysuria, calf pain, or dizziness/lightheadedness. now in CCU - sepesis gram -     interval - noted seen by renal; reviewed chart ; repeat cultures      PAST MEDICAL & SURGICAL HISTORY:  Chronic CHF  COPD, severe  Hypertension      MEDICATIONS  (STANDING):  chlorhexidine 4% Liquid 1 Application(s) Topical two times a day  ciprofloxacin     Tablet 750 milliGRAM(s) Oral two times a day  collagenase Ointment 1 Application(s) Topical two times a day  Dakins Solution - 1/2 Strength 1 Application(s) Topical two times a day  furosemide    Tablet 60 milliGRAM(s) Oral daily  heparin  Injectable 5000 Unit(s) SubCutaneous every 8 hours  pantoprazole    Tablet 40 milliGRAM(s) Oral before breakfast  silver sulfADIAZINE 1% Cream 1 Application(s) Topical two times a day  tamsulosin 0.4 milliGRAM(s) Oral at bedtime    MEDICATIONS  (PRN):  acetaminophen   Tablet .. 650 milliGRAM(s) Oral every 6 hours PRN Mild Pain (1 - 3)  ALBUTerol/ipratropium for Nebulization 3 milliLiter(s) Nebulizer every 6 hours PRN Bronchospasm      ICU Vital Signs Last 24 Hrs  T(C): 35.8 (25 Aug 2019 23:21), Max: 37 (25 Aug 2019 15:00)  T(F): 96.4 (25 Aug 2019 23:21), Max: 98.6 (25 Aug 2019 15:00)  HR: 62 (26 Aug 2019 01:27) (62 - 87)  BP: 146/65 (25 Aug 2019 23:21) (146/65 - 181/69)  BP(mean): 94 (25 Aug 2019 23:21) (94 - 99)  ABP: --  ABP(mean): --  RR: 25 (25 Aug 2019 23:21) (17 - 25)  SpO2: 98% (26 Aug 2019 01:27) (95% - 98%)        GENERAL :  up and alert sitting in chair   HEENT: PERRLA EOMI  LUNGS: decr bs   CARD: s1 s2 rrr  abd: obese   ext : wrapped   neuro - Alert Awake Ox 3                                15.8   7.35  )-----------( 113      ( 25 Aug 2019 05:29 )             55.5   08-25    144  |  99  |  44<H>  ----------------------------<  111<H>  4.9   |  39<H>  |  1.3    Ca    9.2      25 Aug 2019 05:29  Phos  2.9     08-25  Mg     1.7     08-25    Culture - Blood (08.22.19 @ 06:12)    Specimen Source: .Blood None    Culture Results:   No growth to date.    Culture - Blood (08.22.19 @ 06:12)    Specimen Source: .Blood None    Culture Results:   No growth to date.    MRSA/MSSA PCR (08.20.19 @ 08:51)    MRSA PCR Result.: Negative: CATE Lyn  By: Real-Time PCR (Polymerase Reaction Method)

## 2019-08-26 NOTE — PROGRESS NOTE ADULT - ASSESSMENT
60yo Male w/ PMH of HTN, COPD (on Home O2 as needed), CHF BIBA c/o SOB, improved w/ AVAPS, lasix bronchodilators.  Pt w/ pseudomonas stutzeri sepsis, source is likely his legs.  switched to PO ciprofloxacin, doing well.    #) Acute Hypoxic resp failure 2/2 Acute on chronic diastolic heart failure  - admitted to telemetry  - Lasix PO 60mg daily  - Euvolemic at this time  - Monitor I & O, Cr, and daily weight    #) Acute hypercapnic respiratory failure secondary to COPD exacerbation with chronic CO2 retention 2/2 OHS and COPD  - Respiratory acidosis on presentation  - Improved on AVAPS, cont 4 on 4 off and QHS  - Keep O2 88-92%  - Prednisone 40mg daily 5/5 days  - Neb q4hr and PRN  - Will need AVAPS on discharge, settings will be assessed by pulmonary prior to d/c    #) Sepsis secondary to Pseudomonas stutzeri bacteremia and LE cellulitis  - T max 102 with leukocytosis and normal lactate on presentation  - Afebrile now  - UA neg  - Blood culture shows Pseudomonas stutzeri. Pan sensitive  - continue Cipro 750mg q12 (6/10 days)  - F/U repeat Blood culture negative     #) OLIVE on CKD Improving  -improving craet now 1.3   - FeUrea 13% likely prerenal    #) morbid obesity BMI > 40   - discussed with patient ; diet exercise       stable for dc home when avap in place

## 2019-08-26 NOTE — DIETITIAN INITIAL EVALUATION ADULT. - OTHER INFO
Pt admitted for acute hypoxic/hypercapnic resp failure 2/2 acute on chronic diastolic HF and COPD exac. Pt downgraded to medicine today. hospital course includes Sepsis secondary to Pseudomonas stutzeri bacteremia and LE cellulitis and OLIVE on CKD (improving, note hyperkalemia). Pt is pending AVAP auth for d/c.

## 2019-08-26 NOTE — PROGRESS NOTE ADULT - SUBJECTIVE AND OBJECTIVE BOX
Patient is a 59y old  Male who presents with a chief complaint of CHF exacerbation (26 Aug 2019 06:15)      Over Night Events:  Patient seen and examined feel good no distress       ROS:  See HPI    PHYSICAL EXAM    ICU Vital Signs Last 24 Hrs  T(C): 35.9 (26 Aug 2019 07:02), Max: 37 (25 Aug 2019 15:00)  T(F): 96.6 (26 Aug 2019 07:02), Max: 98.6 (25 Aug 2019 15:00)  HR: 70 (26 Aug 2019 07:05) (62 - 80)  BP: 158/74 (26 Aug 2019 07:05) (146/65 - 158/74)  BP(mean): 106 (26 Aug 2019 07:05) (94 - 106)  ABP: --  ABP(mean): --  RR: 23 (26 Aug 2019 07:05) (17 - 25)  SpO2: 95% (26 Aug 2019 07:05) (95% - 98%)      General: AOX3  HEENT:   lashae             Lymph Nodes: NO cervical LN   Lungs: Bilateral BS  Cardiovascular: Regular   Abdomen: Soft, Positive BS  Extremities: No clubbing 2 edema   Skin: warm   Neurological:  no focla deficit   Musculoskeletal: move all ext     I&O's Detail    25 Aug 2019 07:01  -  26 Aug 2019 07:00  --------------------------------------------------------  IN:    Oral Fluid: 1090 mL    Solution: 50 mL  Total IN: 1140 mL    OUT:  Total OUT: 0 mL    Total NET: 1140 mL          LABS:                          15.8   7.35  )-----------( 113      ( 25 Aug 2019 05:29 )             55.5         25 Aug 2019 05:29    144    |  99     |  44     ----------------------------<  111    4.9     |  39     |  1.3      Ca    9.2        25 Aug 2019 05:29  Phos  2.9       25 Aug 2019 05:29  Mg     1.7       25 Aug 2019 05:29                                                                                                                                                                                                                                       MEDICATIONS  (STANDING):  amLODIPine   Tablet 10 milliGRAM(s) Oral daily  chlorhexidine 4% Liquid 1 Application(s) Topical two times a day  ciprofloxacin     Tablet 750 milliGRAM(s) Oral two times a day  collagenase Ointment 1 Application(s) Topical two times a day  Dakins Solution - 1/2 Strength 1 Application(s) Topical two times a day  docusate sodium 100 milliGRAM(s) Oral three times a day  furosemide    Tablet 60 milliGRAM(s) Oral daily  heparin  Injectable 5000 Unit(s) SubCutaneous every 8 hours  pantoprazole    Tablet 40 milliGRAM(s) Oral before breakfast  silver sulfADIAZINE 1% Cream 1 Application(s) Topical two times a day  tamsulosin 0.4 milliGRAM(s) Oral at bedtime    MEDICATIONS  (PRN):  acetaminophen   Tablet .. 650 milliGRAM(s) Oral every 6 hours PRN Mild Pain (1 - 3)  ALBUTerol/ipratropium for Nebulization 3 milliLiter(s) Nebulizer every 6 hours PRN Bronchospasm          Xrays:  TLC:  OG:  ET tube:                                                                                       ECHO:  CAM ICU:

## 2019-08-26 NOTE — PROGRESS NOTE ADULT - ASSESSMENT
IMPRESSION:  ARF on CRF hypercapnia   OLIVE improving   HFpEF   Pulmonary hypertension likely WHO II/III  Pseudomonas bacteremia   Cellulitis lower ext   HO AZIZA/OHS with non compliance to CPAP     PLAN:    CNS: no sedation     HEENT: oral care    PULMONARY: AVAPs as needed and at night; Will require AVAPS at home; nebulizer Q 8 hrs and prn;     CARDIOVASCULAR:  Lasix PO 60;  Keep I=<O; Target MAP > 65; Cardio FU    GI: GI prophylaxis. Feeding as tolerated     RENAL: FU lytes;     INFECTIOUS DISEASE: Antibiotics per ID;     HEMATOLOGICAL:  DVT prophylaxis.    ENDOCRINE:  Follow up FS.  Insulin protocol if needed.    MUSCULOSKELETAL: Wound care of LE; Burn eval    follow as outpatient need FULL PFt and sleep study   pending AVAPS delivery   transfer to floor

## 2019-08-26 NOTE — PROGRESS NOTE ADULT - ASSESSMENT
60yo Male w/ PMH of HTN, COPD (on Home O2 as needed), CHF BIBA c/o SOB .    Pt seen for OLIVE, hyperkalemia, worsening met alkalosis    Non-oliguric OLIVE due to massive drop in BP, from ~ 230 down to 90's systolic in a matter of a few hours, causing a severe pre-renal state.  Now resolved    Worsening met alkalosis - due to loop diuretics and CO2 retention - most likely  - check ABG< if pH>7.5 - give a few doses of Diamox    Hyperkalemia - strict 2 gr K diet  - may need Kayxalate 30 gr twice a week if persistent    D/W HS

## 2019-08-26 NOTE — DIETITIAN INITIAL EVALUATION ADULT. - ENERGY NEEDS
estimated needs: 1615-2020kcal (20-25kcal/kgIBW) accounts for obese BMI, desirable wt loss. Higher end of needs more desirable as pt actual wt is 200+ over IBW.  estimated protein needs: 80-97g (1.0-1.2g/kg) as above, considers CKD. Lower end of needs more desirable  estimated fluid needs: per LIP, currently 1200ml fluid restriction

## 2019-08-26 NOTE — DIETITIAN INITIAL EVALUATION ADULT. - FACTORS AFF FOOD INTAKE
Pt reports good appetite and PO intake, consistently consuming 75% of meal trays. Purposefully not consuming 100% of trays as pt is trying to lose wt. Pt is considering bariatric sx. RD provided diet education for wt loss and encouraged pt to see outpt RD. LBM 8/26. NKFA. No prior vit/min supplementation. Denies chew/swallow difficulty.

## 2019-08-26 NOTE — DIETITIAN INITIAL EVALUATION ADULT. - PHYSICAL APPEARANCE
Pt says UBW is 425#. Highest wt per bed scale 471#, lowest is 430#. IBW (actual ht 72" per pt) 178#+/-10%. Edema 2+ b/l ft and ankle noted. B/L LE wounds./obese

## 2019-08-26 NOTE — PROGRESS NOTE ADULT - SUBJECTIVE AND OBJECTIVE BOX
SUBJECTIVE:    Patient is a 59y old Male who presents with a chief complaint of CHF exacerbation (26 Aug 2019 08:53)    Currently admitted to medicine with the primary diagnosis of Acute decompensated heart failure     Today is hospital day 7d. This morning he is resting comfortably in bed and reports no new issues or overnight events.     PAST MEDICAL & SURGICAL HISTORY  Chronic CHF  COPD, severe  Hypertension    SOCIAL HISTORY:  Negative for smoking/alcohol/drug use.     Home Medications:  Home Medications:  furosemide 40 mg oral tablet: 1 tab(s) orally once a day (19 Aug 2019 02:14)  lisinopril 40 mg oral tablet: 1 tab(s) orally once a day (19 Aug 2019 02:14)  metoprolol tartrate 25 mg oral tablet: 1 tab(s) orally 2 times a day (19 Aug 2019 02:14)  tamsulosin 0.4 mg oral capsule: 1 cap(s) orally once a day (19 Aug 2019 02:14)      ALLERGIES:  Allergy Status Unknown    MEDICATIONS:  STANDING MEDICATIONS  amLODIPine   Tablet 10 milliGRAM(s) Oral daily  chlorhexidine 4% Liquid 1 Application(s) Topical two times a day  ciprofloxacin     Tablet 750 milliGRAM(s) Oral two times a day  collagenase Ointment 1 Application(s) Topical two times a day  Dakins Solution - 1/2 Strength 1 Application(s) Topical two times a day  dextrose 50% Injectable 50 milliLiter(s) IV Push once  docusate sodium 100 milliGRAM(s) Oral three times a day  furosemide    Tablet 60 milliGRAM(s) Oral daily  heparin  Injectable 5000 Unit(s) SubCutaneous every 8 hours  insulin regular  human recombinant. 10 Unit(s) IV Push once  pantoprazole    Tablet 40 milliGRAM(s) Oral before breakfast  silver sulfADIAZINE 1% Cream 1 Application(s) Topical two times a day  tamsulosin 0.4 milliGRAM(s) Oral at bedtime    PRN MEDICATIONS  acetaminophen   Tablet .. 650 milliGRAM(s) Oral every 6 hours PRN  ALBUTerol/ipratropium for Nebulization 3 milliLiter(s) Nebulizer every 6 hours PRN    VITALS:   Vital Signs Last 24 Hrs  T(C): 35.9 (26 Aug 2019 07:02), Max: 37 (25 Aug 2019 15:00)  T(F): 96.6 (26 Aug 2019 07:02), Max: 98.6 (25 Aug 2019 15:00)  HR: 70 (26 Aug 2019 07:05) (62 - 80)  BP: 158/74 (26 Aug 2019 07:05) (146/65 - 158/74)  BP(mean): 106 (26 Aug 2019 07:05) (94 - 106)  RR: 23 (26 Aug 2019 07:05) (17 - 25)  SpO2: 95% (26 Aug 2019 07:05) (95% - 98%)  CAPILLARY BLOOD GLUCOSE          LABS:                        15.9   7.46  )-----------( 122      ( 26 Aug 2019 07:58 )             55.0     08-26    145  |  97<L>  |  34<H>  ----------------------------<  104<H>  5.6<H>   |  41<HH>  |  1.2    Ca    9.3      26 Aug 2019 08:04  Phos  2.9     08-25  Mg     1.9     08-26    TPro  7.0  /  Alb  3.2<L>  /  TBili  1.1  /  DBili  x   /  AST  15  /  ALT  13  /  AlkPhos  72  08-26        RADIOLOGY:    PHYSICAL EXAM:  GEN: No acute distress  LUNGS: Clear to auscultation bilaterally   HEART: S1/S2 present. RRR.   ABD: Soft, non-tender, non-distended. Bowel sounds present  EXT: Chronic venous ulcers  NEURO: AAOX3

## 2019-08-26 NOTE — CHART NOTE - NSCHARTNOTEFT_GEN_A_CORE
Called by lab regarding CO2 result of 43, minimally increased from 41 yesterday. Pt w CHF exaccebration,previously o Bipap. Currently on O2 via NC at 2L/min. He is comfortable, in NAD, w Pox 94-95%. Repeat labs ordered for am.

## 2019-08-26 NOTE — PROGRESS NOTE ADULT - SUBJECTIVE AND OBJECTIVE BOX
Nephrology progress note    Patient is seen and examined, events over the last 24 h noted .  Denies SOB at rest, seen for hyperK and alkalosis.    Allergies:  Allergy Status Unknown    Hospital Medications:   MEDICATIONS  (STANDING):  amLODIPine   Tablet 10 milliGRAM(s) Oral daily  chlorhexidine 4% Liquid 1 Application(s) Topical two times a day  ciprofloxacin     Tablet 750 milliGRAM(s) Oral two times a day  collagenase Ointment 1 Application(s) Topical two times a day  Dakins Solution - 1/2 Strength 1 Application(s) Topical two times a day  dextrose 50% Injectable 50 milliLiter(s) IV Push once  docusate sodium 100 milliGRAM(s) Oral three times a day  furosemide    Tablet 60 milliGRAM(s) Oral daily  heparin  Injectable 5000 Unit(s) SubCutaneous every 8 hours  insulin regular  human recombinant. 10 Unit(s) IV Push once  pantoprazole    Tablet 40 milliGRAM(s) Oral before breakfast  silver sulfADIAZINE 1% Cream 1 Application(s) Topical two times a day  tamsulosin 0.4 milliGRAM(s) Oral at bedtime        VITALS:  T(F): 96.6 (08-26-19 @ 07:02), Max: 98.6 (08-25-19 @ 15:00)  HR: 70 (08-26-19 @ 07:05)  BP: 158/74 (08-26-19 @ 07:05)  RR: 23 (08-26-19 @ 07:05)  SpO2: 95% (08-26-19 @ 07:05)  Wt(kg): --    08-24 @ 07:01  -  08-25 @ 07:00  --------------------------------------------------------  IN: 1040 mL / OUT: 0 mL / NET: 1040 mL    08-25 @ 07:01  -  08-26 @ 07:00  --------------------------------------------------------  IN: 1140 mL / OUT: 0 mL / NET: 1140 mL          PHYSICAL EXAM:  Constitutional: NAD obese  HEENT: anicteric sclera,  MMM  Respiratory: CTAB  Cardiovascular: S1, S2, RRR  Gastrointestinal: BS+, soft, NT/ND  Extremities: 2+ peripheral edema, ace wraps, stasis dermatitis  Neurological: A/O x 3, no focal deficits  : No CVA tenderness. No broussard.   Skin: No rashes  Vascular Access:    LABS:  08-26    145  |  97<L>  |  34<H>  ----------------------------<  104<H>  5.6<H>   |  41<HH>  |  1.2  Creatinine Trend: 1.2<--, 1.3<--, 1.5<--, 1.3<--, 1.6<--, 1.8<--    Ca    9.3      26 Aug 2019 08:04  Phos  2.9     08-25  Mg     1.9     08-26    TPro  7.0  /  Alb  3.2<L>  /  TBili  1.1  /  DBili      /  AST  15  /  ALT  13  /  AlkPhos  72  08-26                          15.9   7.46  )-----------( 122      ( 26 Aug 2019 07:58 )             55.0       Urine Studies:    Osmolality, Random Urine: 336 mos/kg (08-20 @ 12:16)  Potassium, Random Urine: 36 mmol/L (08-20 @ 12:15)  Sodium, Random Urine: 46.0 mmoL/L (08-20 @ 12:15)  Creatinine, Random Urine: 112 mg/dL (08-20 @ 12:15)  Chloride, Random Urine: 52 (08-20 @ 12:15)    RADIOLOGY & ADDITIONAL STUDIES:  < from: Xray Chest 1 View- PORTABLE-Routine (08.22.19 @ 06:44) >  Findings suggestive of mild stable CHF.    < end of copied text >

## 2019-08-26 NOTE — PROGRESS NOTE ADULT - ASSESSMENT
58yo Male w/ PMH of HTN, COPD (on Home O2 as needed), CHF BIBA c/o SOB     #) Acute Hypoxic resp failure 2/2 Acute on chronic diastolic heart failure  - Downgrade to Medicine  - Echo 08/2019 was suboptimal study shows Normal EF with LV Moderate concentric hypertrophy  - Duplex b/l LE neg  - Lasix PO 60mg daily  - Euvolemic at this time  - Monitor I & O, Cr, and daily weight  - d/c aarti    #) Acute hypercapnic respiratory failure secondary to COPD exacerbation with chronic CO2 retention 2/2 OHS and COPD  - Respiratory acidosis on presentation  - Improved on AVAPS, cont 4 on 4 off and QHS  - Keep O2 88-92%  - Prednisone 40mg daily 5/5 days  - Neb q6hr and PRN  - Pending AVAP authorization for discharge    #) Sepsis secondary to Pseudomonas stutzeri bacteremia and LE cellulitis  - T max 102 with leukocytosis and normal lactate on presentation  - Afebrile now  - UA neg  - Blood culture shows Pseudomonas stutzeri. Pan sensitive  - Finished Cefepime 2000mg Q12 (4 days). Start Cipro 750mg q12 (8/10 days)  - Blood culture Neg  - F/U ID recommendations  - Burn recommended Sana and Dakin    #) OLIVE on CKD Improving  - Cr today 1.3 (baseline 1.3)  - Possible Cardiorenal vs Diuretic induced vs Hypotension induced vs Vancomycin induced  - Hold lisinopril  - FeUrea 13% likely prerenal  - Nephrology consulted and recommended to decrease lasix    #) Bradycardia medication induced  - Hold Metoprolol     #) Hyperkalemia  - Likely Secondary ro OLIVE  - Hold Lisinopril  - Insulin and Dextrose  - F/U repeat BMP    DVT ppx Heparin Sq  DASH diet with Fluid restriction  PT/Rehab recommended home with VNS / Short term

## 2019-08-27 LAB
ANION GAP SERPL CALC-SCNC: 7 MMOL/L — SIGNIFICANT CHANGE UP (ref 7–14)
BUN SERPL-MCNC: 28 MG/DL — HIGH (ref 10–20)
CALCIUM SERPL-MCNC: 9.3 MG/DL — SIGNIFICANT CHANGE UP (ref 8.5–10.1)
CHLORIDE SERPL-SCNC: 96 MMOL/L — LOW (ref 98–110)
CO2 SERPL-SCNC: 42 MMOL/L — CRITICAL HIGH (ref 17–32)
CREAT SERPL-MCNC: 1.2 MG/DL — SIGNIFICANT CHANGE UP (ref 0.7–1.5)
CULTURE RESULTS: SIGNIFICANT CHANGE UP
CULTURE RESULTS: SIGNIFICANT CHANGE UP
GLUCOSE SERPL-MCNC: 106 MG/DL — HIGH (ref 70–99)
POTASSIUM SERPL-MCNC: 4.7 MMOL/L — SIGNIFICANT CHANGE UP (ref 3.5–5)
POTASSIUM SERPL-SCNC: 4.7 MMOL/L — SIGNIFICANT CHANGE UP (ref 3.5–5)
SODIUM SERPL-SCNC: 145 MMOL/L — SIGNIFICANT CHANGE UP (ref 135–146)
SPECIMEN SOURCE: SIGNIFICANT CHANGE UP
SPECIMEN SOURCE: SIGNIFICANT CHANGE UP

## 2019-08-27 RX ADMIN — Medication 100 MILLIGRAM(S): at 06:20

## 2019-08-27 RX ADMIN — Medication 1 APPLICATION(S): at 18:10

## 2019-08-27 RX ADMIN — Medication 100 MILLIGRAM(S): at 22:32

## 2019-08-27 RX ADMIN — Medication 750 MILLIGRAM(S): at 18:11

## 2019-08-27 RX ADMIN — Medication 100 MILLIGRAM(S): at 13:24

## 2019-08-27 RX ADMIN — Medication 1 APPLICATION(S): at 06:24

## 2019-08-27 RX ADMIN — HEPARIN SODIUM 5000 UNIT(S): 5000 INJECTION INTRAVENOUS; SUBCUTANEOUS at 13:24

## 2019-08-27 RX ADMIN — Medication 60 MILLIGRAM(S): at 06:20

## 2019-08-27 RX ADMIN — Medication 750 MILLIGRAM(S): at 06:20

## 2019-08-27 RX ADMIN — Medication 650 MILLIGRAM(S): at 07:42

## 2019-08-27 RX ADMIN — Medication 650 MILLIGRAM(S): at 08:14

## 2019-08-27 RX ADMIN — Medication 1 APPLICATION(S): at 06:25

## 2019-08-27 RX ADMIN — PANTOPRAZOLE SODIUM 40 MILLIGRAM(S): 20 TABLET, DELAYED RELEASE ORAL at 06:20

## 2019-08-27 RX ADMIN — TAMSULOSIN HYDROCHLORIDE 0.4 MILLIGRAM(S): 0.4 CAPSULE ORAL at 22:32

## 2019-08-27 RX ADMIN — HEPARIN SODIUM 5000 UNIT(S): 5000 INJECTION INTRAVENOUS; SUBCUTANEOUS at 06:20

## 2019-08-27 RX ADMIN — HEPARIN SODIUM 5000 UNIT(S): 5000 INJECTION INTRAVENOUS; SUBCUTANEOUS at 22:33

## 2019-08-27 RX ADMIN — AMLODIPINE BESYLATE 10 MILLIGRAM(S): 2.5 TABLET ORAL at 06:20

## 2019-08-27 NOTE — PROGRESS NOTE ADULT - ASSESSMENT
60yo Male w/ PMH of HTN, COPD (on Home O2 as needed), CHF BIBA c/o SOB .    Pt seen for OLIVE, hyperkalemia, worsening met alkalosis    Severe met alkalosis - due to loop diuretics and CO2 retention - most likely  - pt used CPAP last night, so bicarb should be lower, if not check ABG< if pH>7.5 - give a few doses of Diamox  - needs pulmonary f/u   - hold Lasix x1 and reaccess    Hyperkalemia - strict 2 gr K diet, K corrected now  - may need Kayxalate 30 gr twice a week if persistent    OLIVE - resolved

## 2019-08-27 NOTE — PROGRESS NOTE ADULT - SUBJECTIVE AND OBJECTIVE BOX
60yo Male w/ PMH of HTN, COPD (on Home O2 as needed), CHF BIBA c/o SOB that started day prior to admission . Associated w/ cough w/ clear sputum. Pt reports chronically having SOB and cough due to COPD, but reports new onset of more severe SOB  Pt also reports having fever/chills. Pt thinks he has an infection from his legs. + severe chronic venous stasis and ulcers b/l. Pt sees vascular surgeon weekly for his condition. In ED, pt was in acute distress due to dyspnea. BNP 3100s noted. CXR shows vascular congestion b/l. IV lasix 80mg given and BIPAP applied w/ great relief. Pt was very hypertensive w/ SBP > 200, nitro drip started. Pt was febrile w/ Tmax of 103.4. Pt denies any CP, palpitation, abd pain, N/V/D, dysuria, calf pain, or dizziness/lightheadedness. now in CCU - sepesis gram -     interval - transferred to floor - awaiting insurance approval of AVAP     PAST MEDICAL & SURGICAL HISTORY:  Chronic CHF  COPD, severe  Hypertension      MEDICATIONS  (STANDING):  chlorhexidine 4% Liquid 1 Application(s) Topical two times a day  ciprofloxacin     Tablet 750 milliGRAM(s) Oral two times a day  collagenase Ointment 1 Application(s) Topical two times a day  Dakins Solution - 1/2 Strength 1 Application(s) Topical two times a day  furosemide    Tablet 60 milliGRAM(s) Oral daily  heparin  Injectable 5000 Unit(s) SubCutaneous every 8 hours  pantoprazole    Tablet 40 milliGRAM(s) Oral before breakfast  silver sulfADIAZINE 1% Cream 1 Application(s) Topical two times a day  tamsulosin 0.4 milliGRAM(s) Oral at bedtime    MEDICATIONS  (PRN):  acetaminophen   Tablet .. 650 milliGRAM(s) Oral every 6 hours PRN Mild Pain (1 - 3)  ALBUTerol/ipratropium for Nebulization 3 milliLiter(s) Nebulizer every 6 hours PRN Bronchospasm      Vital Signs Last 24 Hrs  T(C): 36.1 (27 Aug 2019 05:15), Max: 36.6 (26 Aug 2019 22:29)  T(F): 96.9 (27 Aug 2019 05:15), Max: 97.9 (26 Aug 2019 22:29)  HR: 70 (27 Aug 2019 05:15) (68 - 78)  BP: 171/74 (27 Aug 2019 05:15) (146/76 - 172/81)  BP(mean): 106 (26 Aug 2019 07:05) (106 - 106)  RR: 18 (27 Aug 2019 05:15) (17 - 23)  SpO2: 92% (26 Aug 2019 12:05) (92% - 95%)        GENERAL :  up and alert sitting in chair   HEENT: PERRLA EOMI  LUNGS: decr bs   CARD: s1 s2 rrr  abd: obese   ext : wrapped   neuro - Alert Awake Ox 3                                15.8   7.35  )-----------( 113      ( 25 Aug 2019 05:29 )             55.5   08-25    144  |  99  |  44<H>  ----------------------------<  111<H>  4.9   |  39<H>  |  1.3    Ca    9.2      25 Aug 2019 05:29  Phos  2.9     08-25  Mg     1.7     08-25    Culture - Blood (08.22.19 @ 06:12)    Specimen Source: .Blood None    Culture Results:   No growth to date.    Culture - Blood (08.22.19 @ 06:12)    Specimen Source: .Blood None    Culture Results:   No growth to date.    MRSA/MSSA PCR (08.20.19 @ 08:51)    MRSA PCR Result.: Negative: Notes  CATE GALVAN  By: Real-Time PCR (Polymerase Reaction Method)

## 2019-08-27 NOTE — CHART NOTE - NSCHARTNOTEFT_GEN_A_CORE
59 yr old male admitted on 8/19/19 for Acute Resp. Failure, Chronic Resp. Failure Hypercapnia and CHF.  Mr. Sanders's hypercapnia has not significantly been alleviated with the use of BIPAP during his hospital stay:  his PCO2 upon admission was >80 after using BIPAP PCO2 decreased to 64 but on unfortunately trended up to 71 on 8/20/19, proving BIPAP has been tried and is not effective for his hypercapneia. This patient will require a NIV  on discharge for use during bedtime, nap time and periods of shortness of breath. Without this NIV this patient will clinically decline causing readmissions.    Pt demonstrates accessory muscle use at rest and during a six minute walk, requiring repeated rest stops. Acute and Chronic Resp failure negatively affects his performance of ADL'S and his quality of life. Patient feels he is not able to engage in pulmonary rehabilitation due to severity of his dyspnea.    Patient is an ideal candidate for advanced therapy such as the Life 2000 he would benefit from this lightweight wearable volume augmentation ventilator. It may also help his hypercapnia.    The Life 2000 AVAP would help prevent readmissions and exacerbations  and increase Mr. Sanders's participation in physical  and social activities. 59 yr old male admitted on 8/19/19 for Acute Resp. Failure, Chronic Resp. Failure Hypercapnia and CHF.  Mr. Sanders's hypercapnia has not significantly been alleviated with the use of BIPAP during his hospital stay:  his PCO2 upon admission was >80 after using BIPAP PCO2 decreased to 64 but on unfortunately trended up to 71 on 8/20/19, proving BIPAP has been tried and is not effective for his hypercapneia. This patient will require a NIV  on discharge for use during bedtime, nap time and periods of shortness of breath. Without this NIV this patient will clinically decline causing readmissions.

## 2019-08-27 NOTE — PROGRESS NOTE ADULT - SUBJECTIVE AND OBJECTIVE BOX
Nephrology progress note    Patient is seen and examined, events over the last 24 h noted .  Used CPAP for 4 hrs last night.   Allergies:  Allergy Status Unknown    Hospital Medications:   MEDICATIONS  (STANDING):  amLODIPine   Tablet 10 milliGRAM(s) Oral daily  chlorhexidine 4% Liquid 1 Application(s) Topical two times a day  ciprofloxacin     Tablet 750 milliGRAM(s) Oral two times a day  collagenase Ointment 1 Application(s) Topical two times a day  Dakins Solution - 1/2 Strength 1 Application(s) Topical two times a day  docusate sodium 100 milliGRAM(s) Oral three times a day  furosemide    Tablet 60 milliGRAM(s) Oral daily  heparin  Injectable 5000 Unit(s) SubCutaneous every 8 hours  pantoprazole    Tablet 40 milliGRAM(s) Oral before breakfast  silver sulfADIAZINE 1% Cream 1 Application(s) Topical two times a day  tamsulosin 0.4 milliGRAM(s) Oral at bedtime        VITALS:  T(F): 96.9 (08-27-19 @ 05:15), Max: 97.9 (08-26-19 @ 22:29)  HR: 70 (08-27-19 @ 05:15)  BP: 171/74 (08-27-19 @ 05:15)  RR: 18 (08-27-19 @ 05:15)  SpO2: 92% (08-26-19 @ 12:05)  Wt(kg): --    08-25 @ 07:01  -  08-26 @ 07:00  --------------------------------------------------------  IN: 1140 mL / OUT: 0 mL / NET: 1140 mL    08-26 @ 07:01  -  08-27 @ 07:00  --------------------------------------------------------  IN: 0 mL / OUT: 700 mL / NET: -700 mL          PHYSICAL EXAM:  Constitutional: NAD, morbidly obese  HEENT: anicteric sclera, MMM  Neck: No JVD  Respiratory: diminished BS b/l  Cardiovascular: S1, S2, RRR  Gastrointestinal: BS+, soft, NT/ND  Extremities: + peripheral edema  Neurological: A/O x 3, no focal deficits  : No CVA tenderness. No broussard.   Skin: No rashes  Vascular Access:    LABS:  08-27    145  |  96<L>  |  28<H>  ----------------------------<  106<H>  4.7   |  x   |  1.2    Ca    9.3      27 Aug 2019 06:15  Mg     1.9     08-26    TPro  7.0  /  Alb  3.2<L>  /  TBili  1.1  /  DBili      /  AST  15  /  ALT  13  /  AlkPhos  72  08-26                          15.9   7.46  )-----------( 122      ( 26 Aug 2019 07:58 )             55.0       Urine Studies:    Osmolality, Random Urine: 336 mos/kg (08-20 @ 12:16)  Potassium, Random Urine: 36 mmol/L (08-20 @ 12:15)  Sodium, Random Urine: 46.0 mmoL/L (08-20 @ 12:15)  Creatinine, Random Urine: 112 mg/dL (08-20 @ 12:15)  Chloride, Random Urine: 52 (08-20 @ 12:15)    RADIOLOGY & ADDITIONAL STUDIES:

## 2019-08-28 RX ADMIN — Medication 1 APPLICATION(S): at 06:51

## 2019-08-28 RX ADMIN — TAMSULOSIN HYDROCHLORIDE 0.4 MILLIGRAM(S): 0.4 CAPSULE ORAL at 21:21

## 2019-08-28 RX ADMIN — Medication 100 MILLIGRAM(S): at 21:21

## 2019-08-28 RX ADMIN — Medication 100 MILLIGRAM(S): at 14:23

## 2019-08-28 RX ADMIN — AMLODIPINE BESYLATE 10 MILLIGRAM(S): 2.5 TABLET ORAL at 06:49

## 2019-08-28 RX ADMIN — Medication 650 MILLIGRAM(S): at 21:00

## 2019-08-28 RX ADMIN — PANTOPRAZOLE SODIUM 40 MILLIGRAM(S): 20 TABLET, DELAYED RELEASE ORAL at 06:46

## 2019-08-28 RX ADMIN — Medication 1 APPLICATION(S): at 17:05

## 2019-08-28 RX ADMIN — Medication 1 APPLICATION(S): at 06:50

## 2019-08-28 RX ADMIN — HEPARIN SODIUM 5000 UNIT(S): 5000 INJECTION INTRAVENOUS; SUBCUTANEOUS at 21:21

## 2019-08-28 RX ADMIN — Medication 650 MILLIGRAM(S): at 20:45

## 2019-08-28 RX ADMIN — Medication 650 MILLIGRAM(S): at 06:47

## 2019-08-28 RX ADMIN — Medication 1 APPLICATION(S): at 06:49

## 2019-08-28 RX ADMIN — HEPARIN SODIUM 5000 UNIT(S): 5000 INJECTION INTRAVENOUS; SUBCUTANEOUS at 14:23

## 2019-08-28 RX ADMIN — Medication 60 MILLIGRAM(S): at 06:46

## 2019-08-28 RX ADMIN — Medication 750 MILLIGRAM(S): at 06:46

## 2019-08-28 RX ADMIN — Medication 750 MILLIGRAM(S): at 17:04

## 2019-08-28 RX ADMIN — HEPARIN SODIUM 5000 UNIT(S): 5000 INJECTION INTRAVENOUS; SUBCUTANEOUS at 06:47

## 2019-08-28 RX ADMIN — Medication 100 MILLIGRAM(S): at 06:46

## 2019-08-28 NOTE — PROGRESS NOTE ADULT - ASSESSMENT
60yo Male w/ PMH of HTN, COPD (on Home O2 as needed), CHF BIBA c/o SOB, improved w/ AVAPS, lasix bronchodilators.  Pt w/ pseudomonas stutzeri sepsis, source is likely his legs.  switched to PO ciprofloxacin, doing well.    #) Acute Hypoxic resp failure 2/2 Acute on chronic diastolic heart failure  - admitted to telemetry  - Lasix PO 60mg daily  - Euvolemic at this time  - Monitor I & O, Cr, and daily weight    #) Acute hypercapnic respiratory failure secondary to COPD exacerbation with chronic CO2 retention 2/2 OHS and COPD  - Respiratory acidosis on presentation  - Improved on AVAPS, cont 4 on 4 off and QHS  - Keep O2 88-92%  - Prednisone 40mg daily 5/5 days  - Neb q4hr and PRN  - Will need AVAPS on discharge, settings will be assessed by pulmonary prior to d/c    #) Sepsis secondary to Pseudomonas stutzeri bacteremia and LE cellulitis  - T max 102 with leukocytosis and normal lactate on presentation  - Afebrile now  - UA neg  - Blood culture shows Pseudomonas stutzeri. Pan sensitive  - continue Cipro 750mg q12 (710 days)  - F/U repeat Blood culture negative     #) OLIVE on CKD Improving  -improving craet now 1.3   - FeUrea 13% likely prerenal    #) morbid obesity BMI > 40   - discussed with patient ; diet exercise       stable for dc home when avap in place

## 2019-08-28 NOTE — PROGRESS NOTE ADULT - SUBJECTIVE AND OBJECTIVE BOX
60yo Male w/ PMH of HTN, COPD (on Home O2 as needed), CHF BIBA c/o SOB that started day prior to admission . Associated w/ cough w/ clear sputum. Pt reports chronically having SOB and cough due to COPD, but reports new onset of more severe SOB  Pt also reports having fever/chills. Pt thinks he has an infection from his legs. + severe chronic venous stasis and ulcers b/l. Pt sees vascular surgeon weekly for his condition. In ED, pt was in acute distress due to dyspnea. BNP 3100s noted. CXR shows vascular congestion b/l. IV lasix 80mg given and BIPAP applied w/ great relief. Pt was very hypertensive w/ SBP > 200, nitro drip started. Pt was febrile w/ Tmax of 103.4. Pt denies any CP, palpitation, abd pain, N/V/D, dysuria, calf pain, or dizziness/lightheadedness. now in CCU - sepesis gram -     interval - transferred to floor - awaiting insurance approval of AVAP     PAST MEDICAL & SURGICAL HISTORY:  Chronic CHF  COPD, severe  Hypertension      MEDICATIONS  (STANDING):  amLODIPine   Tablet 10 milliGRAM(s) Oral daily  chlorhexidine 4% Liquid 1 Application(s) Topical two times a day  ciprofloxacin     Tablet 750 milliGRAM(s) Oral two times a day  collagenase Ointment 1 Application(s) Topical two times a day  Dakins Solution - 1/2 Strength 1 Application(s) Topical two times a day  docusate sodium 100 milliGRAM(s) Oral three times a day  furosemide    Tablet 60 milliGRAM(s) Oral daily  heparin  Injectable 5000 Unit(s) SubCutaneous every 8 hours  pantoprazole    Tablet 40 milliGRAM(s) Oral before breakfast  silver sulfADIAZINE 1% Cream 1 Application(s) Topical two times a day  tamsulosin 0.4 milliGRAM(s) Oral at bedtime    MEDICATIONS  (PRN):  acetaminophen   Tablet .. 650 milliGRAM(s) Oral every 6 hours PRN Mild Pain (1 - 3)  ALBUTerol/ipratropium for Nebulization 3 milliLiter(s) Nebulizer every 6 hours PRN Bronchospasm        Vital Signs Last 24 Hrs  T(C): 36.1 (27 Aug 2019 05:15), Max: 36.6 (26 Aug 2019 22:29)  T(F): 96.9 (27 Aug 2019 05:15), Max: 97.9 (26 Aug 2019 22:29)  HR: 70 (27 Aug 2019 05:15) (68 - 78)  BP: 171/74 (27 Aug 2019 05:15) (146/76 - 172/81)  BP(mean): 106 (26 Aug 2019 07:05) (106 - 106)  RR: 18 (27 Aug 2019 05:15) (17 - 23)  SpO2: 92% (26 Aug 2019 12:05) (92% - 95%)        GENERAL :  up and alert sitting in chair   HEENT: PERRLA EOMI  LUNGS: decr bs   CARD: s1 s2 rrr  abd: obese   ext : wrapped   neuro - Alert Awake Ox 3       08-27    145  |  96<L>  |  28<H>  ----------------------------<  106<H>  4.7   |  42<HH>  |  1.2    Ca    9.3      27 Aug 2019 06:15

## 2019-08-29 ENCOUNTER — TRANSCRIPTION ENCOUNTER (OUTPATIENT)
Age: 59
End: 2019-08-29

## 2019-08-29 LAB
ALBUMIN SERPL ELPH-MCNC: 3.6 G/DL — SIGNIFICANT CHANGE UP (ref 3.5–5.2)
ALP SERPL-CCNC: 98 U/L — SIGNIFICANT CHANGE UP (ref 30–115)
ALT FLD-CCNC: 55 U/L — HIGH (ref 0–41)
ANION GAP SERPL CALC-SCNC: 10 MMOL/L — SIGNIFICANT CHANGE UP (ref 7–14)
AST SERPL-CCNC: 58 U/L — HIGH (ref 0–41)
BILIRUB SERPL-MCNC: 1.5 MG/DL — HIGH (ref 0.2–1.2)
BUN SERPL-MCNC: 22 MG/DL — HIGH (ref 10–20)
CALCIUM SERPL-MCNC: 9.8 MG/DL — SIGNIFICANT CHANGE UP (ref 8.5–10.1)
CHLORIDE SERPL-SCNC: 93 MMOL/L — LOW (ref 98–110)
CO2 SERPL-SCNC: 37 MMOL/L — HIGH (ref 17–32)
CREAT SERPL-MCNC: 1.3 MG/DL — SIGNIFICANT CHANGE UP (ref 0.7–1.5)
GLUCOSE SERPL-MCNC: 141 MG/DL — HIGH (ref 70–99)
HCT VFR BLD CALC: 56.9 % — HIGH (ref 42–52)
HGB BLD-MCNC: 16.5 G/DL — SIGNIFICANT CHANGE UP (ref 14–18)
MCHC RBC-ENTMCNC: 25.2 PG — LOW (ref 27–31)
MCHC RBC-ENTMCNC: 29 G/DL — LOW (ref 32–37)
MCV RBC AUTO: 86.9 FL — SIGNIFICANT CHANGE UP (ref 80–94)
NRBC # BLD: 0 /100 WBCS — SIGNIFICANT CHANGE UP (ref 0–0)
PLATELET # BLD AUTO: 156 K/UL — SIGNIFICANT CHANGE UP (ref 130–400)
POTASSIUM SERPL-MCNC: 4.6 MMOL/L — SIGNIFICANT CHANGE UP (ref 3.5–5)
POTASSIUM SERPL-SCNC: 4.6 MMOL/L — SIGNIFICANT CHANGE UP (ref 3.5–5)
PROT SERPL-MCNC: 7.9 G/DL — SIGNIFICANT CHANGE UP (ref 6–8)
RBC # BLD: 6.55 M/UL — HIGH (ref 4.7–6.1)
RBC # FLD: 18 % — HIGH (ref 11.5–14.5)
SODIUM SERPL-SCNC: 140 MMOL/L — SIGNIFICANT CHANGE UP (ref 135–146)
WBC # BLD: 6.11 K/UL — SIGNIFICANT CHANGE UP (ref 4.8–10.8)
WBC # FLD AUTO: 6.11 K/UL — SIGNIFICANT CHANGE UP (ref 4.8–10.8)

## 2019-08-29 RX ORDER — AMLODIPINE BESYLATE 2.5 MG/1
1 TABLET ORAL
Qty: 30 | Refills: 0
Start: 2019-08-29

## 2019-08-29 RX ORDER — DOCUSATE SODIUM 100 MG
1 CAPSULE ORAL
Qty: 0 | Refills: 0 | DISCHARGE
Start: 2019-08-29

## 2019-08-29 RX ORDER — SODIUM HYPOCHLORITE 0.125 %
1 SOLUTION, NON-ORAL MISCELLANEOUS
Qty: 473 | Refills: 0
Start: 2019-08-29

## 2019-08-29 RX ORDER — COLLAGENASE CLOSTRIDIUM HIST. 250 UNIT/G
1 OINTMENT (GRAM) TOPICAL
Qty: 1 | Refills: 0
Start: 2019-08-29

## 2019-08-29 RX ADMIN — Medication 650 MILLIGRAM(S): at 08:44

## 2019-08-29 RX ADMIN — Medication 750 MILLIGRAM(S): at 05:46

## 2019-08-29 RX ADMIN — Medication 1 APPLICATION(S): at 05:45

## 2019-08-29 RX ADMIN — Medication 1 APPLICATION(S): at 17:52

## 2019-08-29 RX ADMIN — Medication 100 MILLIGRAM(S): at 21:10

## 2019-08-29 RX ADMIN — Medication 750 MILLIGRAM(S): at 17:51

## 2019-08-29 RX ADMIN — Medication 1 APPLICATION(S): at 05:46

## 2019-08-29 RX ADMIN — Medication 1 APPLICATION(S): at 17:51

## 2019-08-29 RX ADMIN — PANTOPRAZOLE SODIUM 40 MILLIGRAM(S): 20 TABLET, DELAYED RELEASE ORAL at 05:45

## 2019-08-29 RX ADMIN — HEPARIN SODIUM 5000 UNIT(S): 5000 INJECTION INTRAVENOUS; SUBCUTANEOUS at 21:10

## 2019-08-29 RX ADMIN — TAMSULOSIN HYDROCHLORIDE 0.4 MILLIGRAM(S): 0.4 CAPSULE ORAL at 21:10

## 2019-08-29 RX ADMIN — Medication 650 MILLIGRAM(S): at 05:45

## 2019-08-29 RX ADMIN — Medication 60 MILLIGRAM(S): at 05:45

## 2019-08-29 RX ADMIN — AMLODIPINE BESYLATE 10 MILLIGRAM(S): 2.5 TABLET ORAL at 05:46

## 2019-08-29 RX ADMIN — Medication 100 MILLIGRAM(S): at 05:46

## 2019-08-29 RX ADMIN — HEPARIN SODIUM 5000 UNIT(S): 5000 INJECTION INTRAVENOUS; SUBCUTANEOUS at 05:46

## 2019-08-29 NOTE — DISCHARGE NOTE PROVIDER - NSDCFUSCHEDAPPT_GEN_ALL_CORE_FT
CATE GALVAN ; 08/30/2019 ; [facility] [id number] CATE Gee ; 09/25/2019 ; [facility] [place] [address] CATE GALVAN ; 09/25/2019 ; [facility] [place] [address]

## 2019-08-29 NOTE — PROGRESS NOTE ADULT - SUBJECTIVE AND OBJECTIVE BOX
58yo Male w/ PMH of HTN, COPD (on Home O2 as needed), CHF BIBA c/o SOB that started day prior to admission . Associated w/ cough w/ clear sputum. Pt reports chronically having SOB and cough due to COPD, but reports new onset of more severe SOB  Pt also reports having fever/chills. Pt thinks he has an infection from his legs. + severe chronic venous stasis and ulcers b/l. Pt sees vascular surgeon weekly for his condition. In ED, pt was in acute distress due to dyspnea. BNP 3100s noted. CXR shows vascular congestion b/l. IV lasix 80mg given and BIPAP applied w/ great relief. Pt was very hypertensive w/ SBP > 200, nitro drip started. Pt was febrile w/ Tmax of 103.4. Pt denies any CP, palpitation, abd pain, N/V/D, dysuria, calf pain, or dizziness/lightheadedness. now in CCU - sepesis gram -     interval - transferred to floor - awaiting insurance approval of AVAP     PAST MEDICAL & SURGICAL HISTORY:  Chronic CHF  COPD, severe  Hypertension      MEDICATIONS  (STANDING):  amLODIPine   Tablet 10 milliGRAM(s) Oral daily  chlorhexidine 4% Liquid 1 Application(s) Topical two times a day  ciprofloxacin     Tablet 750 milliGRAM(s) Oral two times a day  collagenase Ointment 1 Application(s) Topical two times a day  Dakins Solution - 1/2 Strength 1 Application(s) Topical two times a day  docusate sodium 100 milliGRAM(s) Oral three times a day  furosemide    Tablet 60 milliGRAM(s) Oral daily  heparin  Injectable 5000 Unit(s) SubCutaneous every 8 hours  pantoprazole    Tablet 40 milliGRAM(s) Oral before breakfast  silver sulfADIAZINE 1% Cream 1 Application(s) Topical two times a day  tamsulosin 0.4 milliGRAM(s) Oral at bedtime    MEDICATIONS  (PRN):  acetaminophen   Tablet .. 650 milliGRAM(s) Oral every 6 hours PRN Mild Pain (1 - 3)  ALBUTerol/ipratropium for Nebulization 3 milliLiter(s) Nebulizer every 6 hours PRN Bronchospasm        Vital Signs Last 24 Hrs  T(C): 35.3 (29 Aug 2019 05:00), Max: 36.5 (28 Aug 2019 13:38)  T(F): 95.6 (29 Aug 2019 05:00), Max: 97.7 (28 Aug 2019 13:38)  HR: 76 (29 Aug 2019 05:00) (67 - 76)  BP: 115/56 (29 Aug 2019 05:00) (111/61 - 160/71)  BP(mean): --  RR: 16 (29 Aug 2019 05:00) (16 - 16)  SpO2: 94% (28 Aug 2019 09:18) (94% - 94%)      GENERAL :  up and alert sitting in chair   HEENT: PERRLA EOMI  LUNGS: decr bs   CARD: s1 s2 rrr  abd: obese   ext : wrapped   neuro - Alert Awake Ox 3       08-27    145  |  96<L>  |  28<H>  ----------------------------<  106<H>  4.7   |  42<HH>  |  1.2    Ca    9.3      27 Aug 2019 06:15

## 2019-08-29 NOTE — PROGRESS NOTE ADULT - ASSESSMENT
58yo Male w/ PMH of HTN, COPD (on Home O2 as needed), CHF BIBA c/o SOB, improved w/ AVAPS, lasix bronchodilators.  Pt w/ pseudomonas stutzeri sepsis, source is likely his legs.  switched to PO ciprofloxacin, doing well.    #) Acute Hypoxic resp failure 2/2 Acute on chronic diastolic heart failure  - admitted to telemetry  - Lasix PO 60mg daily  - Euvolemic at this time  - Monitor I & O, Cr, and daily weight    #) Acute hypercapnic respiratory failure secondary to COPD exacerbation with chronic CO2 retention 2/2 OHS and COPD  - Respiratory acidosis on presentation  - Improved on AVAPS, cont 4 on 4 off and QHS  - Keep O2 88-92%  - Prednisone 40mg daily 5/5 days  - Neb q4hr and PRN  - Will need AVAPS on discharge, settings will be assessed by pulmonary prior to d/c    #) Sepsis secondary to Pseudomonas stutzeri bacteremia and LE cellulitis  - T max 102 with leukocytosis and normal lactate on presentation  - Afebrile now  - UA neg  - Blood culture shows Pseudomonas stutzeri. Pan sensitive  - continue Cipro 750mg q12  complet 10 days   - F/U repeat Blood culture negative     #) OLIVE on CKD Improving  -improving craet now 1.3   - FeUrea 13% likely prerenal    #) morbid obesity BMI > 40   - discussed with patient ; diet exercise       stable for dc home when avap in place

## 2019-08-29 NOTE — PROGRESS NOTE ADULT - ASSESSMENT
58yo Male w/ PMH of HTN, COPD (on Home O2 as needed), CHF BIBA c/o SOB .    Pt seen for OLIVE, hyperkalemia, worsening met alkalosis    Severe met alkalosis - due to loop diuretics and CO2 retention , chronic hypercapnia not improving on BiPAP  -please  ABG< if pH>7.5 - give a few doses of Diamox  - needs pulmonary f/u   - Lasix increased to 60 mgIV b/o high O2 requirements, which will worsen met alkalosis    Hyperkalemia - strict 2 gr K diet, K corrected now  - resolved    Will sign off  call as needed

## 2019-08-29 NOTE — DISCHARGE NOTE PROVIDER - NSDCCPCAREPLAN_GEN_ALL_CORE_FT
PRINCIPAL DISCHARGE DIAGNOSIS  Diagnosis: Acute decompensated heart failure  Assessment and Plan of Treatment: AVAP, Lasix PO QD, Norvasc, Lisinorpil held due to Hyperkalemia      SECONDARY DISCHARGE DIAGNOSES  Diagnosis: OLIVE (acute kidney injury)  Assessment and Plan of Treatment: resolved, Lisinoril held, Creat back to baseline    Diagnosis: Cellulitis and abscess of lower leg  Assessment and Plan of Treatment: wound dressing: LLE: Cleanse with N/S, then Silvadene to affected area BID then wrap with Kerlex and ACE wrap.  RLE : Cleanse with Dakins soluton 1/2  strenght to affecred area BID, then apply Santyl to same affected area BID, then apply Kerlex and ACE wrap    Diagnosis: Sepsis due to Pseudomonas species  Assessment and Plan of Treatment: Sepsis due to Pseudomonas species, PO Cipro X 10 days total PRINCIPAL DISCHARGE DIAGNOSIS  Diagnosis: Acute decompensated heart failure  Assessment and Plan of Treatment: AVAP, Lasix PO QD, Norvasc, Lisinorpil held due to Hyperkalemia      SECONDARY DISCHARGE DIAGNOSES  Diagnosis: OLIVE (acute kidney injury)  Assessment and Plan of Treatment: resolved, Lisinoril held, Creat back to baseline    Diagnosis: Cellulitis and abscess of lower leg  Assessment and Plan of Treatment: wound dressing: LLE: Cleanse with N/S, then Silvadene to affected area BID then wrap with Kerlex and ACE wrap.  RLE : Cleanse with Dakins soluton 1/2  strenght to affecred area BID, then apply Santyl to same affected area BID, then apply Kerlex and ACE wrap    Diagnosis: Sepsis due to Pseudomonas species  Assessment and Plan of Treatment: Sepsis due to Pseudomonas species, PO Cipro X 10 days total: completed Rx PRINCIPAL DISCHARGE DIAGNOSIS  Diagnosis: Acute decompensated heart failure  Assessment and Plan of Treatment: Will continue with BIPAP/home O2, , Lasix PO QD, Norvasc, Lisinorpil held due to Hyperkalemia      SECONDARY DISCHARGE DIAGNOSES  Diagnosis: OLIVE (acute kidney injury)  Assessment and Plan of Treatment: resolved, Lisinoril held, Creat back to baseline    Diagnosis: Cellulitis and abscess of lower leg  Assessment and Plan of Treatment: wound dressing: LLE: Cleanse with N/S, then Silvadene to affected area BID then wrap with Kerlex and ACE wrap.  RLE : Cleanse with Dakins soluton 1/2  strenght to affecred area BID, then apply Santyl to same affected area BID, then apply Kerlex and ACE wrap    Diagnosis: Sepsis due to Pseudomonas species  Assessment and Plan of Treatment: Sepsis due to Pseudomonas species, PO Cipro X 10 days total: completed Rx

## 2019-08-29 NOTE — DISCHARGE NOTE PROVIDER - CARE PROVIDER_API CALL
Bandar Castañeda)  Internal Medicine  [address] [place] [address], [address]  [place] [address]  Phone: [phone]  Fax: [phone]  Follow Up Time:

## 2019-08-29 NOTE — PROGRESS NOTE ADULT - SUBJECTIVE AND OBJECTIVE BOX
Nephrology progress note    Patient is seen and examined, events over the last 24 h noted .    Allergies:  Allergy Status Unknown    Hospital Medications:   MEDICATIONS  (STANDING):  amLODIPine   Tablet 10 milliGRAM(s) Oral daily  chlorhexidine 4% Liquid 1 Application(s) Topical two times a day  ciprofloxacin     Tablet 750 milliGRAM(s) Oral two times a day  collagenase Ointment 1 Application(s) Topical two times a day  Dakins Solution - 1/2 Strength 1 Application(s) Topical two times a day  docusate sodium 100 milliGRAM(s) Oral three times a day  furosemide    Tablet 60 milliGRAM(s) Oral daily  heparin  Injectable 5000 Unit(s) SubCutaneous every 8 hours  pantoprazole    Tablet 40 milliGRAM(s) Oral before breakfast  silver sulfADIAZINE 1% Cream 1 Application(s) Topical two times a day  tamsulosin 0.4 milliGRAM(s) Oral at bedtime        VITALS:  T(F): 95.6 (08-29-19 @ 05:00), Max: 97.7 (08-28-19 @ 13:38)  HR: 76 (08-29-19 @ 05:00)  BP: 115/56 (08-29-19 @ 05:00)  RR: 16 (08-29-19 @ 05:00)  SpO2: 94% (08-28-19 @ 09:18)  Wt(kg): --    08-27 @ 07:01  -  08-28 @ 07:00  --------------------------------------------------------  IN: 360 mL / OUT: 1300 mL / NET: -940 mL          PHYSICAL EXAM:  Constitutional: NAD, obese  HEENT: anicteric sclera, MMM  Respiratory: CTAB  Cardiovascular: S1, S2, RRR  Gastrointestinal: BS+, soft, NT/ND  Extremities: 1+ peripheral edema, redness of LE  Neurological: A/O x 3  : No CVA tenderness. No broussard.   Skin: No rashes  Vascular Access:    LABS:            Urine Studies:      RADIOLOGY & ADDITIONAL STUDIES:

## 2019-08-29 NOTE — DISCHARGE NOTE PROVIDER - NSDCFUADDINST_GEN_ALL_CORE_FT
watch fluid intake, monitor for weight gain.    Follow up with Dr Castañeda in watch fluid intake, monitor for weight gain.    Follow up with Dr Castañeda next week: bring discharge  paperwork to review all meds

## 2019-08-30 ENCOUNTER — TRANSCRIPTION ENCOUNTER (OUTPATIENT)
Age: 59
End: 2019-08-30

## 2019-08-30 ENCOUNTER — APPOINTMENT (OUTPATIENT)
Dept: VASCULAR SURGERY | Facility: CLINIC | Age: 59
End: 2019-08-30

## 2019-08-30 VITALS — OXYGEN SATURATION: 88 %

## 2019-08-30 RX ORDER — LISINOPRIL 2.5 MG/1
1 TABLET ORAL
Qty: 0 | Refills: 0 | DISCHARGE

## 2019-08-30 RX ORDER — METOPROLOL TARTRATE 50 MG
1 TABLET ORAL
Qty: 0 | Refills: 0 | DISCHARGE

## 2019-08-30 RX ADMIN — HEPARIN SODIUM 5000 UNIT(S): 5000 INJECTION INTRAVENOUS; SUBCUTANEOUS at 05:36

## 2019-08-30 RX ADMIN — Medication 60 MILLIGRAM(S): at 05:35

## 2019-08-30 RX ADMIN — AMLODIPINE BESYLATE 10 MILLIGRAM(S): 2.5 TABLET ORAL at 05:36

## 2019-08-30 RX ADMIN — Medication 750 MILLIGRAM(S): at 05:36

## 2019-08-30 RX ADMIN — Medication 1 APPLICATION(S): at 05:37

## 2019-08-30 RX ADMIN — PANTOPRAZOLE SODIUM 40 MILLIGRAM(S): 20 TABLET, DELAYED RELEASE ORAL at 05:36

## 2019-08-30 RX ADMIN — Medication 100 MILLIGRAM(S): at 05:36

## 2019-08-30 RX ADMIN — Medication 1 APPLICATION(S): at 05:38

## 2019-08-30 RX ADMIN — Medication 650 MILLIGRAM(S): at 05:35

## 2019-08-30 NOTE — DISCHARGE NOTE NURSING/CASE MANAGEMENT/SOCIAL WORK - PATIENT PORTAL LINK FT
You can access the FollowMyHealth Patient Portal offered by [facility] by registering at the following website: [email or web address]. By joining [employer]’s FollowMyHealth portal, you will also be able to view your health information using other applications (apps) compatible with our system.

## 2019-08-30 NOTE — CHART NOTE - NSCHARTNOTEFT_GEN_A_CORE
Patient claiming he can not afford AVAP, this was discussed with PMD today. He now wants to leave without the use of AVAP at home. Case discussed with PMD and he will be discharged today. Patient is to follow up with Dr Castañeda next week as documented in discharge papers. Patient claiming he can not afford AVAP, this was discussed with PMD today. He now wants to leave without the use of AVAP at home. Case discussed with PMD and he will be discharged today. Patient has BIPAP and home  O2 to use upon discharge. Patient is to follow up with Dr Castañeda next week as documented in discharge papers.

## 2019-08-30 NOTE — PROGRESS NOTE ADULT - REASON FOR ADMISSION
CHF exacerbation

## 2019-08-30 NOTE — PROGRESS NOTE ADULT - PROVIDER SPECIALTY LIST ADULT
Cardiology
Critical Care
Critical Care
Infectious Disease
Infectious Disease
Internal Medicine
Nephrology
Pulmonology
Internal Medicine
Nephrology
Pulmonology
Pulmonology
Infectious Disease

## 2019-08-30 NOTE — PROGRESS NOTE ADULT - SUBJECTIVE AND OBJECTIVE BOX
60yo Male w/ PMH of HTN, COPD (on Home O2 as needed), CHF BIBA c/o SOB that started day prior to admission . Associated w/ cough w/ clear sputum. Pt reports chronically having SOB and cough due to COPD, but reports new onset of more severe SOB  Pt also reports having fever/chills. Pt thinks he has an infection from his legs. + severe chronic venous stasis and ulcers b/l. Pt sees vascular surgeon weekly for his condition. In ED, pt was in acute distress due to dyspnea. BNP 3100s noted. CXR shows vascular congestion b/l. IV lasix 80mg given and BIPAP applied w/ great relief. Pt was very hypertensive w/ SBP > 200, nitro drip started. Pt was febrile w/ Tmax of 103.4. Pt denies any CP, palpitation, abd pain, N/V/D, dysuria, calf pain, or dizziness/lightheadedness. now in CCU - sepesis gram -     interval - transferred to floor - awaiting insurance approval of AVAP     PAST MEDICAL & SURGICAL HISTORY:  Chronic CHF  COPD, severe  Hypertension      MEDICATIONS  (STANDING):  amLODIPine   Tablet 10 milliGRAM(s) Oral daily  chlorhexidine 4% Liquid 1 Application(s) Topical two times a day  ciprofloxacin     Tablet 750 milliGRAM(s) Oral two times a day  collagenase Ointment 1 Application(s) Topical two times a day  Dakins Solution - 1/2 Strength 1 Application(s) Topical two times a day  docusate sodium 100 milliGRAM(s) Oral three times a day  furosemide    Tablet 60 milliGRAM(s) Oral daily  heparin  Injectable 5000 Unit(s) SubCutaneous every 8 hours  pantoprazole    Tablet 40 milliGRAM(s) Oral before breakfast  silver sulfADIAZINE 1% Cream 1 Application(s) Topical two times a day  tamsulosin 0.4 milliGRAM(s) Oral at bedtime    MEDICATIONS  (PRN):  acetaminophen   Tablet .. 650 milliGRAM(s) Oral every 6 hours PRN Mild Pain (1 - 3)  ALBUTerol/ipratropium for Nebulization 3 milliLiter(s) Nebulizer every 6 hours PRN Bronchospasm      Vital Signs Last 24 Hrs  T(C): 36.3 (30 Aug 2019 05:00), Max: 36.8 (29 Aug 2019 21:37)  T(F): 97.3 (30 Aug 2019 05:00), Max: 98.2 (29 Aug 2019 21:37)  HR: 97 (30 Aug 2019 05:00) (78 - 97)  BP: 160/73 (30 Aug 2019 05:00) (148/79 - 160/73)  BP(mean): --  RR: 16 (30 Aug 2019 05:00) (16 - 16)  SpO2: 94% (29 Aug 2019 22:44) (94% - 96%)      GENERAL :  up and alert sitting in chair   HEENT: PERRLA EOMI  LUNGS: decr bs   CARD: s1 s2 rrr  abd: obese   ext : wrapped   neuro - Alert Awake Ox 3       08-29    140  |  93<L>  |  22<H>  ----------------------------<  141<H>  4.6   |  37<H>  |  1.3    Ca    9.8      29 Aug 2019 08:21    TPro  7.9  /  Alb  3.6  /  TBili  1.5<H>  /  DBili  x   /  AST  58<H>  /  ALT  55<H>  /  AlkPhos  98  08-29

## 2019-09-03 DIAGNOSIS — J96.01 ACUTE RESPIRATORY FAILURE WITH HYPOXIA: ICD-10-CM

## 2019-09-03 DIAGNOSIS — I50.33 ACUTE ON CHRONIC DIASTOLIC (CONGESTIVE) HEART FAILURE: ICD-10-CM

## 2019-09-03 DIAGNOSIS — N18.9 CHRONIC KIDNEY DISEASE, UNSPECIFIED: ICD-10-CM

## 2019-09-03 DIAGNOSIS — A41.9 SEPSIS, UNSPECIFIED ORGANISM: ICD-10-CM

## 2019-09-03 DIAGNOSIS — A41.52 SEPSIS DUE TO PSEUDOMONAS: ICD-10-CM

## 2019-09-03 DIAGNOSIS — L03.119 CELLULITIS OF UNSPECIFIED PART OF LIMB: ICD-10-CM

## 2019-09-03 DIAGNOSIS — J44.1 CHRONIC OBSTRUCTIVE PULMONARY DISEASE WITH (ACUTE) EXACERBATION: ICD-10-CM

## 2019-09-03 DIAGNOSIS — I87.2 VENOUS INSUFFICIENCY (CHRONIC) (PERIPHERAL): ICD-10-CM
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
## 2019-09-03 DIAGNOSIS — I13.0 HYPERTENSIVE HEART AND CHRONIC KIDNEY DISEASE WITH HEART FAILURE AND STAGE 1 THROUGH STAGE 4 CHRONIC KIDNEY DISEASE, OR UNSPECIFIED CHRONIC KIDNEY DISEASE: ICD-10-CM

## 2019-09-03 DIAGNOSIS — N17.9 ACUTE KIDNEY FAILURE, UNSPECIFIED: ICD-10-CM

## 2019-09-03 DIAGNOSIS — E87.3 ALKALOSIS: ICD-10-CM

## 2019-09-03 DIAGNOSIS — L97.929 NON-PRESSURE CHRONIC ULCER OF UNSPECIFIED PART OF LEFT LOWER LEG WITH UNSPECIFIED SEVERITY: ICD-10-CM

## 2019-09-03 DIAGNOSIS — E87.5 HYPERKALEMIA: ICD-10-CM

## 2019-09-03 DIAGNOSIS — T44.7X5A ADVERSE EFFECT OF BETA-ADRENORECEPTOR ANTAGONISTS, INITIAL ENCOUNTER: ICD-10-CM

## 2019-09-03 DIAGNOSIS — R00.1 BRADYCARDIA, UNSPECIFIED: ICD-10-CM

## 2019-09-03 DIAGNOSIS — E66.2 MORBID (SEVERE) OBESITY WITH ALVEOLAR HYPOVENTILATION: ICD-10-CM

## 2019-09-03 DIAGNOSIS — Z91.19 PATIENT'S NONCOMPLIANCE WITH OTHER MEDICAL TREATMENT AND REGIMEN: ICD-10-CM

## 2019-09-03 DIAGNOSIS — L97.919 NON-PRESSURE CHRONIC ULCER OF UNSPECIFIED PART OF RIGHT LOWER LEG WITH UNSPECIFIED SEVERITY: ICD-10-CM

## 2019-09-03 DIAGNOSIS — I27.20 PULMONARY HYPERTENSION, UNSPECIFIED: ICD-10-CM

## 2019-09-03 DIAGNOSIS — J96.02 ACUTE RESPIRATORY FAILURE WITH HYPERCAPNIA: ICD-10-CM

## 2019-09-20 PROBLEM — E66.01 MORBID OBESITY DUE TO EXCESS CALORIES: Status: ACTIVE | Noted: 2019-09-20

## 2019-09-20 PROBLEM — R60.0 BILATERAL EDEMA OF LOWER EXTREMITY: Status: ACTIVE | Noted: 2019-09-20

## 2019-09-20 PROBLEM — I10 HYPERTENSION: Status: ACTIVE | Noted: 2019-09-20

## 2019-09-25 ENCOUNTER — APPOINTMENT (OUTPATIENT)
Dept: SURGERY | Facility: CLINIC | Age: 59
End: 2019-09-25

## 2019-09-25 DIAGNOSIS — I10 ESSENTIAL (PRIMARY) HYPERTENSION: ICD-10-CM

## 2019-09-25 DIAGNOSIS — R60.0 LOCALIZED EDEMA: ICD-10-CM

## 2019-09-25 DIAGNOSIS — E66.01 MORBID (SEVERE) OBESITY DUE TO EXCESS CALORIES: ICD-10-CM

## 2021-11-24 ENCOUNTER — OUTPATIENT (OUTPATIENT)
Dept: OUTPATIENT SERVICES | Facility: HOSPITAL | Age: 61
LOS: 1 days | Discharge: HOME | End: 2021-11-24
Payer: MEDICARE

## 2021-11-24 DIAGNOSIS — R06.02 SHORTNESS OF BREATH: ICD-10-CM

## 2021-11-24 DIAGNOSIS — R07.9 CHEST PAIN, UNSPECIFIED: ICD-10-CM

## 2021-11-24 PROCEDURE — 78452 HT MUSCLE IMAGE SPECT MULT: CPT | Mod: 26

## 2022-03-14 NOTE — PATIENT PROFILE ADULT - ..
Detail Level: Detailed
Patient Specific Counseling (Will Not Stick From Patient To Patient): recommend excision with general or plastic surgeon if bothersome.
19-Aug-2019 04:31:43

## 2022-05-16 ENCOUNTER — TRANSCRIPTION ENCOUNTER (OUTPATIENT)
Age: 62
End: 2022-05-16

## 2022-05-16 ENCOUNTER — OUTPATIENT (OUTPATIENT)
Dept: OUTPATIENT SERVICES | Facility: HOSPITAL | Age: 62
LOS: 1 days | Discharge: HOME | End: 2022-05-16
Payer: MEDICARE

## 2022-05-16 ENCOUNTER — RESULT REVIEW (OUTPATIENT)
Age: 62
End: 2022-05-16

## 2022-05-16 VITALS
TEMPERATURE: 97 F | OXYGEN SATURATION: 88 % | DIASTOLIC BLOOD PRESSURE: 67 MMHG | HEART RATE: 63 BPM | SYSTOLIC BLOOD PRESSURE: 135 MMHG | RESPIRATION RATE: 15 BRPM

## 2022-05-16 VITALS
SYSTOLIC BLOOD PRESSURE: 195 MMHG | HEIGHT: 72 IN | WEIGHT: 315 LBS | RESPIRATION RATE: 18 BRPM | DIASTOLIC BLOOD PRESSURE: 96 MMHG | HEART RATE: 78 BPM | TEMPERATURE: 97 F

## 2022-05-16 PROCEDURE — 88305 TISSUE EXAM BY PATHOLOGIST: CPT | Mod: 26

## 2022-05-16 RX ORDER — LISINOPRIL 2.5 MG/1
0 TABLET ORAL
Qty: 0 | Refills: 0 | DISCHARGE

## 2022-05-16 RX ORDER — TAMSULOSIN HYDROCHLORIDE 0.4 MG/1
1 CAPSULE ORAL
Qty: 0 | Refills: 0 | DISCHARGE

## 2022-05-16 RX ORDER — FUROSEMIDE 40 MG
1 TABLET ORAL
Qty: 0 | Refills: 0 | DISCHARGE

## 2022-05-16 RX ORDER — METOPROLOL TARTRATE 50 MG
1 TABLET ORAL
Qty: 0 | Refills: 0 | DISCHARGE

## 2022-05-16 NOTE — ASU DISCHARGE PLAN (ADULT/PEDIATRIC) - NS MD DC FALL RISK RISK
For information on Fall & Injury Prevention, visit: [email or web address] OR  [email or web address] OR  https://www.cdc.gov/steadi/patient.html

## 2022-05-16 NOTE — CHART NOTE - NSCHARTNOTEFT_GEN_A_CORE
PACU ANESTHESIA ADMISSION NOTE      Procedure:   Post op diagnosis:      ____  Intubated  TV:______       Rate: ______      FiO2: ______    __x__  Patent Airway    __x__  Full return of protective reflexes    __x__  Full recovery from anesthesia / back to baseline     Vitals:   See Anesthesia record  T- 97.6 P- 64 R-21 B/P- 127/56 SPO2- 93% on 3L NC    Mental Status:  __x__ Awake   _____ Alert   _____ Drowsy   _____ Sedated    Nausea/Vomiting:  __x__ NO  ______Yes,   See Post - Op Orders          Pain Scale (0-10):  _____    Treatment: ____ None    ____ See Post - Op/PCA Orders    Post - Operative Fluids:   ____ Oral   __x__ See Post - Op Orders    Plan: Discharge:   __x__Home       _____Floor     _____Critical Care    _____  Other:_________________    Comments: No anesthesia complications/issues noted. Discharge to HOME when PACU criteria met.

## 2022-05-16 NOTE — ASU PATIENT PROFILE, ADULT - FALL HARM RISK - UNIVERSAL INTERVENTIONS
Bed in lowest position, wheels locked, appropriate side rails in place/Call bell, personal items and telephone in reach/Instruct patient to call for assistance before getting out of bed or chair/Non-slip footwear when patient is out of bed/[place] to call system/Physically safe environment - no spills, clutter or unnecessary equipment/Purposeful Proactive Rounding/Room/bathroom lighting operational, light cord in reach

## 2022-05-16 NOTE — ASU DISCHARGE PLAN (ADULT/PEDIATRIC) - CARE PROVIDER_API CALL
Vicente Crockett)  Gastroenterology; Internal Medicine  [address] [place] [address]  Phone: [phone]  Fax: [phone]  Follow Up Time:

## 2022-05-17 LAB
SURGICAL PATHOLOGY STUDY: SIGNIFICANT CHANGE UP
SURGICAL PATHOLOGY STUDY: SIGNIFICANT CHANGE UP

## 2022-05-20 DIAGNOSIS — D12.2 BENIGN NEOPLASM OF ASCENDING COLON: ICD-10-CM

## 2022-05-20 DIAGNOSIS — K57.30 DIVERTICULOSIS OF LARGE INTESTINE WITHOUT PERFORATION OR ABSCESS WITHOUT BLEEDING: ICD-10-CM

## 2022-05-20 DIAGNOSIS — Z12.11 ENCOUNTER FOR SCREENING FOR MALIGNANT NEOPLASM OF COLON: ICD-10-CM

## 2022-05-20 DIAGNOSIS — G47.33 OBSTRUCTIVE SLEEP APNEA (ADULT) (PEDIATRIC): ICD-10-CM

## 2022-05-20 DIAGNOSIS — J44.9 CHRONIC OBSTRUCTIVE PULMONARY DISEASE, UNSPECIFIED: ICD-10-CM

## 2022-05-20 DIAGNOSIS — I50.9 HEART FAILURE, UNSPECIFIED: ICD-10-CM

## 2022-05-20 DIAGNOSIS — B96.81 HELICOBACTER PYLORI [H. PYLORI] AS THE CAUSE OF DISEASES CLASSIFIED ELSEWHERE: ICD-10-CM

## 2022-05-20 DIAGNOSIS — I11.0 HYPERTENSIVE HEART DISEASE WITH HEART FAILURE: ICD-10-CM

## 2022-05-20 DIAGNOSIS — K29.50 UNSPECIFIED CHRONIC GASTRITIS WITHOUT BLEEDING: ICD-10-CM

## 2023-04-28 ENCOUNTER — APPOINTMENT (OUTPATIENT)
Dept: NEUROLOGY | Facility: CLINIC | Age: 63
End: 2023-04-28
Payer: MEDICARE

## 2023-04-28 ENCOUNTER — NON-APPOINTMENT (OUTPATIENT)
Age: 63
End: 2023-04-28

## 2023-04-28 VITALS
SYSTOLIC BLOOD PRESSURE: 191 MMHG | HEART RATE: 70 BPM | BODY MASS INDEX: 42.66 KG/M2 | DIASTOLIC BLOOD PRESSURE: 100 MMHG | HEIGHT: 72 IN | WEIGHT: 315 LBS

## 2023-04-28 DIAGNOSIS — Z78.9 OTHER SPECIFIED HEALTH STATUS: ICD-10-CM

## 2023-04-28 DIAGNOSIS — Z86.39 PERSONAL HISTORY OF OTHER ENDOCRINE, NUTRITIONAL AND METABOLIC DISEASE: ICD-10-CM

## 2023-04-28 DIAGNOSIS — Z86.79 PERSONAL HISTORY OF OTHER DISEASES OF THE CIRCULATORY SYSTEM: ICD-10-CM

## 2023-04-28 PROCEDURE — 99203 OFFICE O/P NEW LOW 30 MIN: CPT

## 2023-04-28 RX ORDER — METOPROLOL SUCCINATE 25 MG/1
25 TABLET, EXTENDED RELEASE ORAL
Refills: 0 | Status: ACTIVE | COMMUNITY

## 2023-04-28 RX ORDER — LISINOPRIL 40 MG/1
40 TABLET ORAL
Refills: 0 | Status: ACTIVE | COMMUNITY

## 2023-04-28 RX ORDER — SIMVASTATIN 40 MG/1
40 TABLET, FILM COATED ORAL
Refills: 0 | Status: ACTIVE | COMMUNITY

## 2023-04-28 RX ORDER — AMLODIPINE BESYLATE 5 MG/1
5 TABLET ORAL
Refills: 0 | Status: ACTIVE | COMMUNITY

## 2023-04-28 RX ORDER — FUROSEMIDE 40 MG/1
40 TABLET ORAL
Refills: 0 | Status: ACTIVE | COMMUNITY

## 2023-04-28 NOTE — PHYSICAL EXAM
[FreeTextEntry1] : MS: Awake, alert, oriented to person, place, situation and time.  Follows commands. \par \par Language: Speech is clear, fluent. No dysarthria. \par \par CNs 2 - 12 intact. EOMI no nystagmus, no diplopia. No facial asymmetry b/l. Tongue midline, normal movements, no atrophy.\par \par Motor: Normal muscle bulk & tone. No noticeable tremor or seizure. No pronator drift. Muscle strength of b/l UE and b/l LE 5/5. EXCEPT right finger extension 4/5, finger abduction weakness \par \par Sensation: absent PP in right ulnar distribution and right medial forearm\par \par Cortical: No extinction\par \par Coordination: Intact rapid-alternating movements. No dysmetria to FTN. \par \par DTR: 1+ in biceps, brachioradialis and triceps; 0 in patellar and ankle; plantars are down b/l\par \par Gait: No postural instability. Normal stance and tandem gait.\par \par General: Alert and oriented to person, place, time, and situation. In no acute distress. Cooperative. Follows commands. \par Eyes: Sclera and conjunctiva were normal and pupils were equal in size, round, reactive to light, with normal accommodation\par ENT: Ears and nose normal in appearance. \par Vascular: No peripheral edema.\par Respiratory: No visible respiratory distress. \par Musculoskeletal: No involuntary movements were seen.\par Skin: Normal skin color and pigmentation.\par
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
## 2023-04-28 NOTE — REVIEW OF SYSTEMS
[Feeling Poorly] : feeling poorly [Depression] : depression [As Noted in HPI] : as noted in HPI [Arthralgias] : arthralgias [Joint Pain] : joint pain [Negative] : Heme/Lymph [FreeTextEntry7] : gastric bypass
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
## 2023-11-20 ENCOUNTER — NON-APPOINTMENT (OUTPATIENT)
Age: 63
End: 2023-11-20

## 2023-12-28 ENCOUNTER — APPOINTMENT (OUTPATIENT)
Dept: NEUROLOGY | Facility: CLINIC | Age: 63
End: 2023-12-28

## 2024-01-10 ENCOUNTER — APPOINTMENT (OUTPATIENT)
Dept: NEUROLOGY | Facility: CLINIC | Age: 64
End: 2024-01-10
Payer: MEDICARE

## 2024-01-10 VITALS
SYSTOLIC BLOOD PRESSURE: 179 MMHG | WEIGHT: 315 LBS | BODY MASS INDEX: 42.66 KG/M2 | DIASTOLIC BLOOD PRESSURE: 118 MMHG | HEIGHT: 72 IN | HEART RATE: 103 BPM | OXYGEN SATURATION: 98 % | TEMPERATURE: 97.5 F

## 2024-01-10 DIAGNOSIS — M54.12 RADICULOPATHY, CERVICAL REGION: ICD-10-CM

## 2024-01-10 PROCEDURE — 99214 OFFICE O/P EST MOD 30 MIN: CPT

## 2024-01-10 NOTE — PHYSICAL EXAM
[FreeTextEntry1] : MS: Awake, alert, oriented to person, place, situation and time.  Follows commands.   Language: Speech is clear, fluent. No dysarthria.   CNs 2 - 12 intact. EOMI no nystagmus, no diplopia. No facial asymmetry b/l. Tongue midline, normal movements, no atrophy.  Motor: Normal muscle bulk & tone. No noticeable tremor or seizure. No pronator drift. Muscle strength of b/l UE and b/l LE 5/5. EXCEPT right finger extension 4/5, finger abduction weakness; atorphy in the right FDI and 5th digit abduction 1/5  Sensation: absent PP in right ulnar distribution and right medial forearm  Cortical: No extinction  Coordination: Intact rapid-alternating movements. No dysmetria to FTN.   DTR: 1+ in biceps, brachioradialis and triceps; 0 in patellar and ankle; plantars are down b/l  Gait: No postural instability. Normal stance and tandem gait.  General: Alert and oriented to person, place, time, and situation. In no acute distress. Cooperative. Follows commands.  Eyes: Sclera and conjunctiva were normal and pupils were equal in size, round, reactive to light, with normal accommodation ENT: Ears and nose normal in appearance.  Vascular: No peripheral edema. Respiratory: No visible respiratory distress.  Musculoskeletal: No involuntary movements were seen. Skin: Normal skin color and pigmentation.
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
## 2024-03-07 ENCOUNTER — EMERGENCY (EMERGENCY)
Facility: HOSPITAL | Age: 64
LOS: 0 days | Discharge: HOME | End: 2024-03-07
Attending: EMERGENCY MEDICINE
Payer: MEDICARE

## 2024-03-07 VITALS — HEART RATE: 94 BPM | DIASTOLIC BLOOD PRESSURE: 94 MMHG | SYSTOLIC BLOOD PRESSURE: 177 MMHG

## 2024-03-07 VITALS
HEIGHT: 71 IN | OXYGEN SATURATION: 96 % | HEART RATE: 102 BPM | SYSTOLIC BLOOD PRESSURE: 191 MMHG | RESPIRATION RATE: 18 BRPM | WEIGHT: 315 LBS | TEMPERATURE: 97 F | DIASTOLIC BLOOD PRESSURE: 115 MMHG

## 2024-03-07 DIAGNOSIS — S61.011A LACERATION WITHOUT FOREIGN BODY OF RIGHT THUMB WITHOUT DAMAGE TO NAIL, INITIAL ENCOUNTER: ICD-10-CM
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
## 2024-03-07 PROCEDURE — 99283 EMERGENCY DEPT VISIT LOW MDM: CPT | Mod: 25

## 2024-03-07 PROCEDURE — 99284 EMERGENCY DEPT VISIT MOD MDM: CPT | Mod: 25

## 2024-03-07 PROCEDURE — 12002 RPR S/N/AX/GEN/TRNK2.6-7.5CM: CPT

## 2024-03-07 RX ORDER — CEPHALEXIN 500 MG
1 CAPSULE ORAL
Qty: 15 | Refills: 0
Start: 2024-03-07 | End: 2024-03-11

## 2024-03-07 NOTE — ED PROVIDER NOTE - NSFOLLOWUPINSTRUCTIONS_ED_ALL_ED_FT
You had absorbable sutures placed as well as sutures that will need to be removed in 2 weeks.     Laceration    A laceration is a cut that goes through all of the layers of the skin and into the tissue that is right under the skin. Some lacerations heal on their own. Others need to be closed with skin adhesive strips, skin glue, stitches (sutures), or staples. Proper laceration care minimizes the risk of infection and helps the laceration to heal better.  If non-absorbable stitches or staples have been placed, they must be taken out within the time frame instructed by your healthcare provider.    SEEK IMMEDIATE MEDICAL CARE IF YOU HAVE ANY OF THE FOLLOWING SYMPTOMS: swelling around the wound, worsening pain, drainage from the wound, red streaking going away from your wound, inability to move finger or toe near the laceration, or discoloration of skin near the laceration.
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
## 2024-03-07 NOTE — ED ADULT NURSE NOTE - DRUG PRE-SCREENING (DAST -1)
Post-Care Instructions: I reviewed with the patient in detail post-care instructions. Patient is to keep the biopsy site dry overnight, and then apply bacitracin twice daily until healed. Patient may apply hydrogen peroxide soaks to remove any crusting. Punch Size In Mm: 5 X Size Of Lesion In Cm (Optional): 0 Detail Level: Detailed Suture Removal: 10 days Home Suture Removal Text: Patient was provided a home suture removal kit and will remove their sutures at home.  If they have any questions or difficulties they will call the office. Epidermal Sutures: 5-0 Prolene Wound Care: Vaseline Anesthesia Volume In Cc: 1.5 Bill For Surgical Tray: no Dressing: bandage Biopsy Type: H and E Notification Instructions: Patient will be notified of biopsy results. However, patient instructed to call the office if not contacted within 2 weeks. Consent: Written consent was obtained and risks were reviewed including but not limited to scarring, infection, bleeding, scabbing, incomplete removal, nerve damage and allergy to anesthesia. Was A Bandage Applied: Yes Hemostasis: Pressure Lab: -[phone] Billing Type: Third-Party [id number] Statement Selected

## 2024-03-07 NOTE — ED ADULT NURSE NOTE - HOW MANY DRINKS CONTAINING ALCOHOL DO YOU HAVE ON A TYPICAL DAY WHEN YOU ARE DRINKING?
Patient is a 61y old  Female who presents with a chief complaint of sob (03 Aug 2019 14:14)      SUBJECTIVE / OVERNIGHT EVENTS: weak, tired  Review of Systems  chest pain no  palpitations no  sob no  nausea no  headache no    MEDICATIONS  (STANDING):  ALBUTerol/ipratropium for Nebulization 3 milliLiter(s) Nebulizer every 6 hours  aspirin enteric coated 81 milliGRAM(s) Oral daily  atorvastatin 20 milliGRAM(s) Oral at bedtime  clopidogrel Tablet 75 milliGRAM(s) Oral daily  dextrose 5%. 1000 milliLiter(s) (50 mL/Hr) IV Continuous <Continuous>  dextrose 50% Injectable 12.5 Gram(s) IV Push once  dextrose 50% Injectable 25 Gram(s) IV Push once  dextrose 50% Injectable 25 Gram(s) IV Push once  docusate sodium 100 milliGRAM(s) Oral three times a day  heparin  Injectable 5000 Unit(s) SubCutaneous every 12 hours  hydrALAZINE 25 milliGRAM(s) Oral three times a day  insulin glargine Injectable (LANTUS) 6 Unit(s) SubCutaneous at bedtime  insulin lispro (HumaLOG) corrective regimen sliding scale   SubCutaneous three times a day before meals  insulin lispro (HumaLOG) corrective regimen sliding scale   SubCutaneous <User Schedule>  insulin lispro (HumaLOG) corrective regimen sliding scale   SubCutaneous at bedtime  insulin lispro Injectable (HumaLOG) 2 Unit(s) SubCutaneous at bedtime  insulin lispro Injectable (HumaLOG) 4 Unit(s) SubCutaneous three times a day before meals  metoprolol succinate ER 50 milliGRAM(s) Oral every 12 hours  multivitamin 1 Tablet(s) Oral daily  pantoprazole    Tablet 40 milliGRAM(s) Oral before breakfast    MEDICATIONS  (PRN):  acetaminophen   Tablet .. 650 milliGRAM(s) Oral every 6 hours PRN Temp greater or equal to 38.5C (101.3F), Mild Pain (1 - 3)  dextrose 40% Gel 15 Gram(s) Oral once PRN Blood Glucose LESS THAN 70 milliGRAM(s)/deciliter  glucagon  Injectable 1 milliGRAM(s) IntraMuscular once PRN Glucose LESS THAN 70 milligrams/deciliter  oxyCODONE    5 mG/acetaminophen 325 mG 2 Tablet(s) Oral every 6 hours PRN Moderate Pain (4 - 6)  senna 2 Tablet(s) Oral at bedtime PRN Constipation      Vital Signs Last 24 Hrs  T(C): 36.7 (03 Aug 2019 14:06), Max: 36.7 (03 Aug 2019 04:42)  T(F): 98 (03 Aug 2019 14:06), Max: 98.1 (03 Aug 2019 04:42)  HR: 73 (03 Aug 2019 14:06) (72 - 102)  BP: 123/71 (03 Aug 2019 14:06) (117/72 - 135/70)  BP(mean): --  RR: 18 (03 Aug 2019 14:06) (18 - 20)  SpO2: 93% (03 Aug 2019 14:06) (93% - 100%)    PHYSICAL EXAM:  GENERAL: NAD, well-developed  HEAD:  Atraumatic, Normocephalic  EYES: EOMI, PERRLA, conjunctiva and sclera clear  NECK: Supple, No JVD  CHEST/LUNG: few rales to auscultation bilaterally; No wheeze  HEART: Regular rate and rhythm; No murmurs, rubs, or gallops  ABDOMEN: Soft, Nontender, Nondistended; Bowel sounds present  EXTREMITIES:  2+ bl edema  PSYCH: AAOx3  NEUROLOGY: non-focal  SKIN: No rashes or lesions    LABS:                        10.4   9.23  )-----------( 210      ( 02 Aug 2019 08:50 )             33.6     08-02    136  |  89<L>  |  38<H>  ----------------------------<  185<H>  5.7<H>   |  24  |  5.95<H>    Ca    9.5      02 Aug 2019 06:17                  RADIOLOGY & ADDITIONAL TESTS:    Imaging Personally Reviewed:    Consultant(s) Notes Reviewed:      Care Discussed with Consultants/Other Providers: 1 or 2

## 2024-03-07 NOTE — ED PROVIDER NOTE - PATIENT PORTAL LINK FT
You can access the FollowMyHealth Patient Portal offered by [facility] by registering at the following website: [email or web address]. By joining [employer]’s FollowMyHealth portal, you will also be able to view your health information using other applications (apps) compatible with our system.

## 2024-03-07 NOTE — ED PROVIDER NOTE - CLINICAL SUMMARY MEDICAL DECISION MAKING FREE TEXT BOX
Wound care and repair. Pt instructed on proper wound care.  Will monitor area closely for signs of infection and will return if any redness, streaking, drainage, increased swelling, fevers or any other concerns. will start prophylactic abx.  advised suture removal in 2 weeks.

## 2024-10-26 NOTE — CDI QUERY NOTE - NSCDIOTHERTXTBX_GEN_ALL_CORE_HH
59 year old male admitted 8/19/19 secondary to shortness of breath.     Internal medicine progress note from 8/20/19, [place]:   #) Acute Hypoxic resp failure 2/2 Acute decompensated HF  - Admit to Telemetry  - CXR shows congestive HF signs, BNP 3162  - Echo 08/2019 was suboptimal study shows Normal EF with LV Moderate concentric hypertrophy  - Duplex b/l LE neg  - Lasix IV 40mg BID  - Monitor I & O, Cr, and daily weight    Cardiology progress note on 8/20/19, [place]:   Echo mod lvh EF 55%.     Based on the information, could the patient's condition be further specified as:     > Acute on chronic diastolic congestive heart failure  > Acute on chronic systolic and diastolic congestive heart failure  > Other (please specify)_______________________  > Unable to determine     Thank you!
Opt out

## 2024-11-19 ENCOUNTER — APPOINTMENT (OUTPATIENT)
Dept: ORTHOPEDIC SURGERY | Facility: CLINIC | Age: 64
End: 2024-11-19
Payer: MEDICARE

## 2024-11-19 VITALS — WEIGHT: 315 LBS | BODY MASS INDEX: 44.1 KG/M2 | HEIGHT: 71 IN

## 2024-11-19 PROCEDURE — 99202 OFFICE O/P NEW SF 15 MIN: CPT

## 2024-11-20 ENCOUNTER — NON-APPOINTMENT (OUTPATIENT)
Age: 64
End: 2024-11-20

## 2024-11-21 DIAGNOSIS — M25.532 PAIN IN LEFT WRIST: ICD-10-CM

## 2024-12-13 ENCOUNTER — NON-APPOINTMENT (OUTPATIENT)
Age: 64
End: 2024-12-13

## 2025-06-17 NOTE — ASU PATIENT PROFILE, ADULT - TOBACCO USE
Patient is a 69y old  Male who presents with a chief complaint of SOB (17 Jun 2025 08:50)        Over Night Events: remains on mechanical ventilation sedated on fentanyl and precedex, remains on levophed 0.07    Vital Signs Last 24 Hrs  T(C): 37.9 (17 Jun 2025 07:11), Max: 39.4 (16 Jun 2025 23:01)  T(F): 100.2 (17 Jun 2025 07:11), Max: 102.9 (16 Jun 2025 23:01)  HR: 67 (17 Jun 2025 08:30) (67 - 778)  BP: 80/48 (17 Jun 2025 08:30) (80/48 - 130/58)  BP(mean): 60 (17 Jun 2025 08:30) (60 - 86)  RR: 26 (17 Jun 2025 08:30) (19 - 26)  SpO2: 99% (17 Jun 2025 08:30) (92% - 100%)    O2 Parameters below as of 17 Jun 2025 04:00  Patient On (Oxygen Delivery Method): ventilator    O2 Concentration (%): 40        CONSTITUTIONAL:   Ill appearing.      ENT:   Airway patent,   Mouth with normal mucosa.   No thrush    EYES:   Pupils equal,   Round and reactive to light.    CARDIAC:   Normal rate,   Regular rhythm.        RESPIRATORY:   No wheezing  Bilateral BS  Normal chest expansion  Not tachypneic,  No use of accessory muscles    GASTROINTESTINAL:  Abdomen soft,   Non-tender,   No guarding,   + BS      MUSCULOSKELETAL:   Range of motion is not limited    NEUROLOGICAL:   sedated     SKIN:   Skin normal color for race          06-16-25 @ 07:01  -  06-17-25 @ 07:00  --------------------------------------------------------  IN:    Dexmedetomidine: 30 mL    Dexmedetomidine: 691.3 mL    Enteral Tube Flush: 100 mL    FentaNYL: 30 mL    FentaNYL: 690 mL    Free Water: 100 mL    IV PiggyBack: 100 mL    Nepro with Carb Steady: 300 mL    Norepinephrine: 304 mL    Peptamen A.F.: 640 mL  Total IN: 2985.3 mL    OUT:    Indwelling Catheter - Urethral (mL): 1850 mL    Propofol: 0 mL  Total OUT: 1850 mL    Total NET: 1135.3 mL      06-17-25 @ 07:01  -  06-17-25 @ 09:15  --------------------------------------------------------  IN:  Total IN: 0 mL    OUT:    Indwelling Catheter - Urethral (mL): 100 mL  Total OUT: 100 mL    Total NET: -100 mL          LABS:                            11.5   22.35 )-----------( 233      ( 17 Jun 2025 06:06 )             39.1                                               06-17    141  |  107  |  59[H]  ----------------------------<  167[H]  4.9   |  24  |  1.9[H]    Ca    8.3[L]      17 Jun 2025 06:06  Mg     2.6     06-17    TPro  4.9[L]  /  Alb  2.6[L]  /  TBili  0.2  /  DBili  x   /  AST  12  /  ALT  7   /  AlkPhos  62  06-17                                             Urinalysis Basic - ( 17 Jun 2025 06:06 )    Color: x / Appearance: x / SG: x / pH: x  Gluc: 167 mg/dL / Ketone: x  / Bili: x / Urobili: x   Blood: x / Protein: x / Nitrite: x   Leuk Esterase: x / RBC: x / WBC x   Sq Epi: x / Non Sq Epi: x / Bacteria: x                                                  LIVER FUNCTIONS - ( 17 Jun 2025 06:06 )  Alb: 2.6 g/dL / Pro: 4.9 g/dL / ALK PHOS: 62 U/L / ALT: 7 U/L / AST: 12 U/L / GGT: x                                                  Culture - Sputum (collected 15 Kleber 2025 16:44)  Source: Sputum Sputum  Gram Stain (16 Jun 2025 01:30):    Rare polymorphonuclear leukocytes per low power field    No Squamous epithelial cells per low power field    Rare Yeast like cells seen per oil power field    Rare Gram Positive Cocci in Clusters seen per oil power field  Final Report (17 Jun 2025 07:52):    Commensal magda consistent with body site    Culture - Blood (collected 14 Jun 2025 15:36)  Source: Blood None  Preliminary Report (17 Jun 2025 01:02):    No growth at 48 Hours                                                   Mode: AC/ CMV (Assist Control/ Continuous Mandatory Ventilation)  RR (machine): 26  TV (machine): 400  FiO2: 40  PEEP: 8  ITime: 1  MAP: 12  PIP: 21                                      ABG - ( 17 Jun 2025 05:48 )  pH, Arterial: 7.32  pH, Blood: x     /  pCO2: 50    /  pO2: 115   / HCO3: 26    / Base Excess: -0.9  /  SaO2: 99.1                MEDICATIONS  (STANDING):  aMIOdarone    Tablet 200 milliGRAM(s) Oral daily  apixaban 5 milliGRAM(s) Enteral Tube two times a day  atorvastatin 40 milliGRAM(s) Oral at bedtime  cefepime   IVPB 1000 milliGRAM(s) IV Intermittent every 12 hours  chlorhexidine 0.12% Liquid 15 milliLiter(s) Oral Mucosa every 12 hours  chlorhexidine 2% Cloths 1 Application(s) Topical <User Schedule>  clopidogrel Tablet 75 milliGRAM(s) Enteral Tube daily  dexMEDEtomidine Infusion 0.05 MICROgram(s)/kG/Hr (1.04 mL/Hr) IV Continuous <Continuous>  dextrose 5%. 1000 milliLiter(s) (100 mL/Hr) IV Continuous <Continuous>  dextrose 5%. 1000 milliLiter(s) (50 mL/Hr) IV Continuous <Continuous>  dextrose 50% Injectable 25 Gram(s) IV Push once  dextrose 50% Injectable 12.5 Gram(s) IV Push once  dextrose 50% Injectable 25 Gram(s) IV Push once  ezetimibe 10 milliGRAM(s) Oral daily  famotidine    Tablet 20 milliGRAM(s) Oral daily  fentaNYL   Infusion. 0.508 MICROgram(s)/kG/Hr (4.23 mL/Hr) IV Continuous <Continuous>  glucagon  Injectable 1 milliGRAM(s) IntraMuscular once  insulin glargine Injectable (LANTUS) 34 Unit(s) SubCutaneous every morning  insulin lispro (ADMELOG) corrective regimen sliding scale   SubCutaneous every 6 hours  midodrine 10 milliGRAM(s) Oral every 8 hours  mupirocin 2% Nasal 1 Application(s) Both Nostrils every 12 hours  norepinephrine Infusion 0.05 MICROgram(s)/kG/Min (7.92 mL/Hr) IV Continuous <Continuous>  propofol Infusion 5 MICROgram(s)/kG/Min (2.54 mL/Hr) IV Continuous <Continuous>  QUEtiapine 25 milliGRAM(s) Oral at bedtime    MEDICATIONS  (PRN):  acetaminophen     Tablet .. 650 milliGRAM(s) Oral every 6 hours PRN Temp greater or equal to 38C (100.4F), Mild Pain (1 - 3)  albuterol/ipratropium for Nebulization 3 milliLiter(s) Nebulizer every 6 hours PRN Shortness of Breath and/or Wheezing  aluminum hydroxide/magnesium hydroxide/simethicone Suspension 30 milliLiter(s) Oral every 4 hours PRN Dyspepsia  dextrose Oral Gel 15 Gram(s) Oral once PRN Blood Glucose LESS THAN 70 milliGRAM(s)/deciliter  guaiFENesin Oral Liquid (Sugar-Free) 200 milliGRAM(s) Oral every 6 hours PRN Cough  melatonin 5 milliGRAM(s) Oral at bedtime PRN Insomnia  ondansetron Injectable 4 milliGRAM(s) IV Push every 8 hours PRN Nausea and/or Vomiting     Never smoker